# Patient Record
Sex: FEMALE | Race: WHITE | Employment: OTHER | URBAN - METROPOLITAN AREA
[De-identification: names, ages, dates, MRNs, and addresses within clinical notes are randomized per-mention and may not be internally consistent; named-entity substitution may affect disease eponyms.]

---

## 2017-04-03 ENCOUNTER — TRANSFERRED RECORDS (OUTPATIENT)
Dept: HEALTH INFORMATION MANAGEMENT | Facility: CLINIC | Age: 72
End: 2017-04-03

## 2017-06-26 ENCOUNTER — TRANSFERRED RECORDS (OUTPATIENT)
Dept: HEALTH INFORMATION MANAGEMENT | Facility: CLINIC | Age: 72
End: 2017-06-26

## 2018-07-09 ENCOUNTER — TRANSFERRED RECORDS (OUTPATIENT)
Dept: HEALTH INFORMATION MANAGEMENT | Facility: CLINIC | Age: 73
End: 2018-07-09

## 2018-11-01 ENCOUNTER — OFFICE VISIT (OUTPATIENT)
Dept: FAMILY MEDICINE | Facility: CLINIC | Age: 73
End: 2018-11-01
Payer: COMMERCIAL

## 2018-11-01 VITALS
HEART RATE: 66 BPM | SYSTOLIC BLOOD PRESSURE: 160 MMHG | RESPIRATION RATE: 18 BRPM | WEIGHT: 195 LBS | OXYGEN SATURATION: 95 % | DIASTOLIC BLOOD PRESSURE: 80 MMHG | BODY MASS INDEX: 32.49 KG/M2 | TEMPERATURE: 97.6 F | HEIGHT: 65 IN

## 2018-11-01 DIAGNOSIS — E03.4 HYPOTHYROIDISM DUE TO ACQUIRED ATROPHY OF THYROID: ICD-10-CM

## 2018-11-01 DIAGNOSIS — Z13.1 SCREENING FOR DIABETES MELLITUS: ICD-10-CM

## 2018-11-01 DIAGNOSIS — E78.5 HYPERLIPIDEMIA LDL GOAL <130: ICD-10-CM

## 2018-11-01 DIAGNOSIS — R20.8 DYSESTHESIA AFFECTING BOTH SIDES OF BODY: Primary | ICD-10-CM

## 2018-11-01 LAB
BASOPHILS # BLD AUTO: 0 10E9/L (ref 0–0.2)
BASOPHILS NFR BLD AUTO: 0.4 %
CHOLEST SERPL-MCNC: 163 MG/DL
DIFFERENTIAL METHOD BLD: NORMAL
EOSINOPHIL # BLD AUTO: 0.2 10E9/L (ref 0–0.7)
EOSINOPHIL NFR BLD AUTO: 2.8 %
ERYTHROCYTE [DISTWIDTH] IN BLOOD BY AUTOMATED COUNT: 12.2 % (ref 10–15)
HBA1C MFR BLD: 5.4 % (ref 0–5.6)
HCT VFR BLD AUTO: 42.5 % (ref 35–47)
HDLC SERPL-MCNC: 56 MG/DL
HGB BLD-MCNC: 14.3 G/DL (ref 11.7–15.7)
LDLC SERPL CALC-MCNC: 73 MG/DL
LYMPHOCYTES # BLD AUTO: 1 10E9/L (ref 0.8–5.3)
LYMPHOCYTES NFR BLD AUTO: 14.4 %
MCH RBC QN AUTO: 31 PG (ref 26.5–33)
MCHC RBC AUTO-ENTMCNC: 33.6 G/DL (ref 31.5–36.5)
MCV RBC AUTO: 92 FL (ref 78–100)
MONOCYTES # BLD AUTO: 0.6 10E9/L (ref 0–1.3)
MONOCYTES NFR BLD AUTO: 8 %
NEUTROPHILS # BLD AUTO: 5.3 10E9/L (ref 1.6–8.3)
NEUTROPHILS NFR BLD AUTO: 74.4 %
NONHDLC SERPL-MCNC: 107 MG/DL
PLATELET # BLD AUTO: 207 10E9/L (ref 150–450)
RBC # BLD AUTO: 4.61 10E12/L (ref 3.8–5.2)
T4 FREE SERPL-MCNC: 1.21 NG/DL (ref 0.76–1.46)
TRIGL SERPL-MCNC: 169 MG/DL
TSH SERPL DL<=0.005 MIU/L-ACNC: 0.3 MU/L (ref 0.4–4)
VIT B12 SERPL-MCNC: 372 PG/ML (ref 193–986)
WBC # BLD AUTO: 7.1 10E9/L (ref 4–11)

## 2018-11-01 PROCEDURE — 80061 LIPID PANEL: CPT | Performed by: PHYSICIAN ASSISTANT

## 2018-11-01 PROCEDURE — 99203 OFFICE O/P NEW LOW 30 MIN: CPT | Performed by: PHYSICIAN ASSISTANT

## 2018-11-01 PROCEDURE — 85025 COMPLETE CBC W/AUTO DIFF WBC: CPT | Performed by: PHYSICIAN ASSISTANT

## 2018-11-01 PROCEDURE — 84439 ASSAY OF FREE THYROXINE: CPT | Performed by: PHYSICIAN ASSISTANT

## 2018-11-01 PROCEDURE — 82607 VITAMIN B-12: CPT | Performed by: PHYSICIAN ASSISTANT

## 2018-11-01 PROCEDURE — 83036 HEMOGLOBIN GLYCOSYLATED A1C: CPT | Performed by: PHYSICIAN ASSISTANT

## 2018-11-01 PROCEDURE — 84443 ASSAY THYROID STIM HORMONE: CPT | Performed by: PHYSICIAN ASSISTANT

## 2018-11-01 PROCEDURE — 36415 COLL VENOUS BLD VENIPUNCTURE: CPT | Performed by: PHYSICIAN ASSISTANT

## 2018-11-01 RX ORDER — METOPROLOL TARTRATE 50 MG
50 TABLET ORAL 2 TIMES DAILY
COMMUNITY
Start: 2018-04-02 | End: 2018-12-03

## 2018-11-01 RX ORDER — SIMVASTATIN 10 MG
10 TABLET ORAL DAILY
COMMUNITY
Start: 2018-01-29 | End: 2018-12-03

## 2018-11-01 RX ORDER — LEVOTHYROXINE SODIUM 112 UG/1
112 TABLET ORAL DAILY
COMMUNITY
Start: 2017-12-11 | End: 2018-12-03

## 2018-11-01 RX ORDER — ASPIRIN 81 MG/1
81 TABLET ORAL DAILY
COMMUNITY
End: 2023-10-30 | Stop reason: ALTCHOICE

## 2018-11-01 RX ORDER — POLYETHYLENE GLYCOL 3350 17 G/17G
17 POWDER, FOR SOLUTION ORAL DAILY PRN
COMMUNITY

## 2018-11-01 NOTE — LETTER
November 9, 2018      Jenna Kaur  [address] [facility] [address]        Dear ,    We are writing to inform you of your test results.    Overall, your labs look good!   Your cholesterol (LDL) is at goal and your thyroid is in good balance.   No signs of anemia, infection, or diabetes.   Your B12 level is on the lower side of normal - I recommend starting a B Complex vitamin or a multivitamin that has B12 in it.     Resulted Orders   Vitamin B12   Result Value Ref Range    Vitamin B12 372 193 - 986 pg/mL   CBC with platelets differential   Result Value Ref Range    WBC 7.1 4.0 - 11.0 10e9/L    RBC Count 4.61 3.8 - 5.2 10e12/L    Hemoglobin 14.3 11.7 - 15.7 g/dL    Hematocrit 42.5 35.0 - 47.0 %    MCV 92 78 - 100 fl    MCH 31.0 26.5 - 33.0 pg    MCHC 33.6 31.5 - 36.5 g/dL    RDW 12.2 10.0 - 15.0 %    Platelet Count 207 150 - 450 10e9/L    % Neutrophils 74.4 %    % Lymphocytes 14.4 %    % Monocytes 8.0 %    % Eosinophils 2.8 %    % Basophils 0.4 %    Absolute Neutrophil 5.3 1.6 - 8.3 10e9/L    Absolute Lymphocytes 1.0 0.8 - 5.3 10e9/L    Absolute Monocytes 0.6 0.0 - 1.3 10e9/L    Absolute Eosinophils 0.2 0.0 - 0.7 10e9/L    Absolute Basophils 0.0 0.0 - 0.2 10e9/L    Diff Method Automated Method    Hemoglobin A1c   Result Value Ref Range    Hemoglobin A1C 5.4 0 - 5.6 %      Comment:      Normal <5.7% Prediabetes 5.7-6.4%  Diabetes 6.5% or higher - adopted from ADA   consensus guidelines.     Lipid panel reflex to direct LDL Fasting   Result Value Ref Range    Cholesterol 163 <200 mg/dL    Triglycerides 169 (H) <150 mg/dL      Comment:      Borderline high:  150-199 mg/dl  High:             200-499 mg/dl  Very high:       >499 mg/dl  Non Fasting      HDL Cholesterol 56 >49 mg/dL    LDL Cholesterol Calculated 73 <100 mg/dL      Comment:      Desirable:       <100 mg/dl    Non HDL Cholesterol 107 <130 mg/dL   TSH with free T4 reflex   Result Value Ref Range    TSH 0.30 (L) 0.40 - 4.00 mU/L    T4 free   Result Value Ref Range    T4 Free 1.21 0.76 - 1.46 ng/dL       If you have any questions or concerns, please call the clinic at the number listed above.       Sincerely,        Kenna Garcia PA-C/javio

## 2018-11-01 NOTE — PROGRESS NOTES
"  SUBJECTIVE:   Jenna Kaur is a 72 year old female who presents to clinic today for the following health issues:      Musculoskeletal problem/pain      Duration: x3-4 months    Description  Location: both feet    Intensity:  On and off     Accompanying signs and symptoms: burning    History  Previous similar problem: no   Previous evaluation:  none    Precipitating or alleviating factors:  Trauma or overuse: no   Aggravating factors include: walking    Therapies tried and outcome: nothing      PROBLEMS TO ADD ON...  None  -------------------------------------    Problem list and histories reviewed & adjusted, as indicated.  Additional history: as documented    Patient Active Problem List   Diagnosis     Hypothyroidism due to acquired atrophy of thyroid     Hyperlipidemia LDL goal <130     No past surgical history on file.    Social History   Substance Use Topics     Smoking status: Never Smoker     Smokeless tobacco: Never Used     Alcohol use Not on file     No family history on file.        Reviewed and updated as needed this visit by clinical staff       Reviewed and updated as needed this visit by Provider         ROS:  Constitutional, neuro, musculoskeletal, integument systems are negative, except as otherwise noted.    OBJECTIVE:                                                    /80  Pulse 66  Temp 97.6  F (36.4  C) (Tympanic)  Resp 18  Ht 5' 4.53\" (1.639 m)  Wt 195 lb (88.5 kg)  SpO2 95%  BMI 32.93 kg/m2  Body mass index is 32.93 kg/(m^2).  GENERAL APPEARANCE: healthy, alert and no distress  MS: extremities normal- no gross deformities noted  ORTHO: Foot Exam: Inspection:  no swelling   Non-tender  Range of Motion: normal    NEURO: Normal strength and tone and sensory exam grossly normal  PSYCH: mentation appears normal and affect normal/bright       ASSESSMENT:                                                      1. Dysesthesia affecting both sides of body    2. Screening for diabetes " mellitus    3. Hyperlipidemia LDL goal <130    4. Hypothyroidism due to acquired atrophy of thyroid         PLAN:                                                    Possible neuropathy versus her shoes being too tight. Routine lab today. Possible EMG in future if burning worsens.     Patient will follow up in 1-2 weeks to establish care.    The patient was in agreement with the plan today and had no questions or concerns prior to leaving the clinic.     Kenna Garcia PA-C  [facility]

## 2018-11-01 NOTE — Clinical Note
Please abstract the following data from this visit with this patient into the appropriate field in Epic:  Colonoscopy done on this date: 03/05/2012 (approximately), by this group: [facility], results were Normal.  Mammogram done on this date: 07/09/2018 (approximately), by this group: [facility], results were Normal .

## 2018-11-01 NOTE — MR AVS SNAPSHOT
"              After Visit Summary   11/1/2018    Jenna Kaur    MRN: [id number]           Patient Information     Date Of Birth          1945        Visit Information        Provider Department      11/1/2018 9:20 AM Kenna Garcia PA-C [facility]        Today's Diagnoses     Dysesthesia affecting both sides of body    -  1    Screening for diabetes mellitus        Hyperlipidemia LDL goal <130        Hypothyroidism due to acquired atrophy of thyroid           Follow-ups after your visit        Your next 10 appointments already scheduled     Nov 12, 2018 10:15 AM CST   Nurse Only with [facility]   [facility]    [address] [place] [address] [phone]              Who to contact     Normal or non-critical lab and imaging results will be communicated to you by MyChart, letter or phone within 4 business days after the clinic has received the results. If you do not hear from us within 7 days, please contact the clinic through MyChart or phone. If you have a critical or abnormal lab result, we will notify you by phone as soon as possible.  Submit refill requests through [employer] or call your pharmacy and they will forward the refill request to us. Please allow 3 business days for your refill to be completed.          If you need to speak with a  for additional information , please call: [phone]             Additional Information About Your Visit        Care EveryWhere ID     This is your Care EveryWhere ID. This could be used by other organizations to access your [facility] medical records  [id number]        Your Vitals Were     Pulse Temperature Respirations Height Pulse Oximetry BMI (Body Mass Index)    66 97.6  F (36.4  C) (Tympanic) 18 5' 4.53\" (1.639 m) 95% 32.93 kg/m2       Blood Pressure from Last 3 Encounters:   11/01/18 160/80   09/22/11 102/67    Weight from Last 3 Encounters:   11/01/18 195 lb " (88.5 kg)   09/22/11 184 lb 3.2 oz (83.6 kg)              We Performed the Following     CBC with platelets differential     Hemoglobin A1c     Lipid panel reflex to direct LDL Fasting     TSH with free T4 reflex     Vitamin B12        Primary Care Provider Office Phone # Fax #    [facility] [phone] [address] [facility] [place] [address]        Equal Access to Services     GRISELDA PEREZ : Hadii aad ku hadasho Soomaali, waaxda luqadaha, qaybta kaalmada adeegyada, waxay idiin hayaan adeeg kharash la'aan . So [facility] [phone].    ATENCIÓN: Si habla espad, tiene a reid disposición servicios gratuitos de asistencia lingüística. Llame al [phone].    We comply with applicable federal civil rights laws and [place] laws. We do not discriminate on the basis of race, color, national origin, age, disability, sex, sexual orientation, or gender identity.            Thank you!     Thank you for choosing [facility]  for your care. Our goal is always to provide you with excellent care. Hearing back from our patients is one way we can continue to improve our services. Please take a few minutes to complete the written survey that you may receive in the mail after your visit with us. Thank you!             Your Updated Medication List - Protect others around you: Learn how to safely use, store and throw away your medicines at www.disposemymeds.org.          This list is accurate as of 11/1/18 10:15 AM.  Always use your most recent med list.                   Brand Name Dispense Instructions for use Diagnosis    aspirin 81 MG EC tablet      Take 81 mg by mouth daily        CALCIUM 500 + D PO      Take  by mouth daily. Takes 2 tablets        CHOLESTYRAMINE LIGHT PO      Take  by mouth.        * levothyroxine 112 MCG tablet    SYNTHROID/LEVOTHROID     Take 112 mcg by mouth daily        * LEVOTHROID 125 MCG tablet   Generic drug:  levothyroxine      Take 125 mcg by mouth daily.         metoprolol succinate 50 MG 24 hr tablet    TOPROL-XL     Take 50 mg by mouth 2 times daily. Takes 1/2 tablet        metoprolol tartrate 50 MG tablet    LOPRESSOR     Take 50 mg by mouth 2 times daily        omega-3 fatty acids 1200 MG capsule      Take 1 capsule by mouth daily.        polyethylene glycol powder    MIRALAX/GLYCOLAX     as needed        * simvastatin 10 MG tablet    ZOCOR     Take 10 mg by mouth daily        * simvastatin 10 MG tablet    ZOCOR     Take 10 mg by mouth At Bedtime.        * Notice:  This list has 4 medication(s) that are the same as other medications prescribed for you. Read the directions carefully, and ask your doctor or other care provider to review them with you.

## 2018-11-05 NOTE — PROGRESS NOTES
Please send the following letter to the patient:    Jenna,    Overall, your labs look good!  Your cholesterol (LDL) is at goal and your thyroid is in good balance.   No signs of anemia, infection, or diabetes.   Your B12 level is on the lower side of normal - I recommend starting a B Complex vitamin or a multivitamin that has B12 in it.    Please call me with any questions or concerns.          Kenna Garcia PA-C

## 2018-11-12 ENCOUNTER — ALLIED HEALTH/NURSE VISIT (OUTPATIENT)
Dept: NURSING | Facility: CLINIC | Age: 73
End: 2018-11-12
Payer: COMMERCIAL

## 2018-11-12 VITALS — DIASTOLIC BLOOD PRESSURE: 80 MMHG | HEART RATE: 61 BPM | SYSTOLIC BLOOD PRESSURE: 124 MMHG

## 2018-11-12 DIAGNOSIS — Z01.30 BP CHECK: Primary | ICD-10-CM

## 2018-11-12 PROCEDURE — 99207 ZZC NO CHARGE NURSE ONLY: CPT

## 2018-11-12 NOTE — PROGRESS NOTES
Jenna Kaur is a 72 year old patient who comes in today for a Blood Pressure check.  Initial BP:  /80  Pulse 61       Disposition: results routed to provider

## 2018-11-12 NOTE — MR AVS SNAPSHOT
After Visit Summary   11/12/2018    Jenna Kaur    MRN: [id number]           Patient Information     Date Of Birth          1945        Visit Information        Provider Department      11/12/2018 10:15 AM [facility]        Today's Diagnoses     BP check    -  1       Follow-ups after your visit        Your next 10 appointments already scheduled     Dec 03, 2018  9:20 AM CST   PHYSICAL with Kenna Garcia PA-C   [facility] [place] ([facility])    [address]  [place] [address] [phone]              Who to contact     If you have questions or need follow up information about today's clinic visit or your schedule please contact [facility] directly at [phone].  Normal or non-critical lab and imaging results will be communicated to you by MyChart, letter or phone within 4 business days after the clinic has received the results. If you do not hear from us within 7 days, please contact the clinic through MyChart or phone. If you have a critical or abnormal lab result, we will notify you by phone as soon as possible.  Submit refill requests through [employer] or call your pharmacy and they will forward the refill request to us. Please allow 3 business days for your refill to be completed.          Additional Information About Your Visit        Care EveryWhere ID     This is your Care EveryWhere ID. This could be used by other organizations to access your [facility] medical records  [id number]        Your Vitals Were     Pulse                   61            Blood Pressure from Last 3 Encounters:   11/12/18 124/80   11/01/18 160/80   09/22/11 102/67    Weight from Last 3 Encounters:   11/01/18 195 lb (88.5 kg)   09/22/11 184 lb 3.2 oz (83.6 kg)              Today, you had the following     No orders found for display       Primary Care Provider Office Phone # Fax #    [facility] [phone]  [phone]       [address] [facility] [address] [address]        Equal Access to Services     GRISELDA PEREZ : Hadii cm bautista joanneo Soayse, waaxda luqadaha, qaybta kaalmada yahaira, pepper gilmerin hayaaaamndeep grahamsalomón kirbyrayna ирина vinson. So [facility] [phone].    ATENCIÓN: Si habla español, tiene a reid disposición servicios gratuitos de asistencia lingüística. Llame al [phone].    We comply with applicable federal civil rights laws and [place] laws. We do not discriminate on the basis of race, color, national origin, age, disability, sex, sexual orientation, or gender identity.            Thank you!     Thank you for choosing [facility]  for your care. Our goal is always to provide you with excellent care. Hearing back from our patients is one way we can continue to improve our services. Please take a few minutes to complete the written survey that you may receive in the mail after your visit with us. Thank you!             Your Updated Medication List - Protect others around you: Learn how to safely use, store and throw away your medicines at www.disposemymeds.org.          This list is accurate as of 11/12/18 10:41 AM.  Always use your most recent med list.                   Brand Name Dispense Instructions for use Diagnosis    aspirin 81 MG EC tablet      Take 81 mg by mouth daily        CALCIUM 500 + D PO      Take  by mouth daily. Takes 2 tablets        CHOLESTYRAMINE LIGHT PO      Take  by mouth.        * levothyroxine 112 MCG tablet    SYNTHROID/LEVOTHROID     Take 112 mcg by mouth daily        * LEVOTHROID 125 MCG tablet   Generic drug:  levothyroxine      Take 125 mcg by mouth daily.        metoprolol succinate 50 MG 24 hr tablet    TOPROL-XL     Take 50 mg by mouth 2 times daily. Takes 1/2 tablet        metoprolol tartrate 50 MG tablet    LOPRESSOR     Take 50 mg by mouth 2 times daily        omega-3 fatty acids 1200 MG capsule      Take 1 capsule by mouth daily.        polyethylene glycol powder     MIRALAX/GLYCOLAX     as needed        * simvastatin 10 MG tablet    ZOCOR     Take 10 mg by mouth daily        * simvastatin 10 MG tablet    ZOCOR     Take 10 mg by mouth At Bedtime.        * Notice:  This list has 4 medication(s) that are the same as other medications prescribed for you. Read the directions carefully, and ask your doctor or other care provider to review them with you.

## 2018-12-03 ENCOUNTER — OFFICE VISIT (OUTPATIENT)
Dept: FAMILY MEDICINE | Facility: CLINIC | Age: 73
End: 2018-12-03
Payer: COMMERCIAL

## 2018-12-03 VITALS
HEART RATE: 76 BPM | WEIGHT: 195.2 LBS | RESPIRATION RATE: 20 BRPM | BODY MASS INDEX: 32.96 KG/M2 | OXYGEN SATURATION: 97 % | SYSTOLIC BLOOD PRESSURE: 124 MMHG | DIASTOLIC BLOOD PRESSURE: 68 MMHG | TEMPERATURE: 96.3 F

## 2018-12-03 DIAGNOSIS — I10 ESSENTIAL HYPERTENSION WITH GOAL BLOOD PRESSURE LESS THAN 140/90: ICD-10-CM

## 2018-12-03 DIAGNOSIS — E03.4 HYPOTHYROIDISM DUE TO ACQUIRED ATROPHY OF THYROID: ICD-10-CM

## 2018-12-03 DIAGNOSIS — E78.5 HYPERLIPIDEMIA LDL GOAL <130: ICD-10-CM

## 2018-12-03 DIAGNOSIS — Z00.01 ENCOUNTER FOR ROUTINE ADULT HEALTH EXAMINATION WITH ABNORMAL FINDINGS: Primary | ICD-10-CM

## 2018-12-03 DIAGNOSIS — I35.0 NONRHEUMATIC AORTIC VALVE STENOSIS: ICD-10-CM

## 2018-12-03 DIAGNOSIS — M85.80 OSTEOPENIA, UNSPECIFIED LOCATION: ICD-10-CM

## 2018-12-03 DIAGNOSIS — Z78.0 ASYMPTOMATIC POSTMENOPAUSAL STATUS: ICD-10-CM

## 2018-12-03 PROCEDURE — 99397 PER PM REEVAL EST PAT 65+ YR: CPT | Performed by: PHYSICIAN ASSISTANT

## 2018-12-03 RX ORDER — METOPROLOL SUCCINATE 50 MG/1
25 TABLET, EXTENDED RELEASE ORAL 2 TIMES DAILY
Qty: 90 TABLET | Refills: 3 | COMMUNITY
Start: 2018-12-03 | End: 2019-01-02

## 2018-12-03 RX ORDER — SIMVASTATIN 10 MG
10 TABLET ORAL AT BEDTIME
Qty: 90 TABLET | Refills: 3 | COMMUNITY
Start: 2018-12-03 | End: 2019-01-02

## 2018-12-03 RX ORDER — LEVOTHYROXINE SODIUM 112 UG/1
112 TABLET ORAL DAILY
Qty: 90 TABLET | Refills: 3 | COMMUNITY
Start: 2018-12-03 | End: 2019-01-02

## 2018-12-03 NOTE — PATIENT INSTRUCTIONS
Preventive Health Recommendations    See your health care provider every year to    Review health changes.     Discuss preventive care.      Review your medicines if your doctor has prescribed any.      You no longer need a yearly Pap test unless you've had an abnormal Pap test in the past 10 years. If you have vaginal symptoms, such as bleeding or discharge, be sure to talk with your provider about a Pap test.      Every 1 to 2 years, have a mammogram.  If you are over 69, talk with your health care provider about whether or not you want to continue having screening mammograms.      Every 10 years, have a colonoscopy. Or, have a yearly FIT test (stool test). These exams will check for colon cancer.       Have a cholesterol test every 5 years, or more often if your doctor advises it.       Have a diabetes test (fasting glucose) every three years. If you are at risk for diabetes, you should have this test more often.       At age 65, have a bone density scan (DEXA) to check for osteoporosis (brittle bone disease).    Shots:    Get a flu shot each year.    Get a tetanus shot every 10 years.    Talk to your doctor about your pneumonia vaccines. There are now two you should receive - Pneumovax (PPSV 23) and Prevnar (PCV 13).    Talk to your pharmacist about the shingles vaccine.    Talk to your doctor about the hepatitis B vaccine.    Nutrition:     Eat at least 5 servings of fruits and vegetables each day.      Eat whole-grain bread, whole-wheat pasta and brown rice instead of white grains and rice.      Get adequate about Calcium and Vitamin D.     Lifestyle    Exercise at least 150 minutes a week (30 minutes a day, 5 days a week). This will help you control your weight and prevent disease.      Limit alcohol to one drink per day.      No smoking.       Wear sunscreen to prevent skin cancer.       See your dentist twice a year for an exam and cleaning.      See your eye doctor every 1 to 2 years to screen for  conditions such as glaucoma, macular degeneration, cataracts, etc.    Personalized Prevention Plan  You are due for the preventive services outlined below.  Your care team is available to assist you in scheduling these services.  If you have already completed any of these items, please share that information with your care team to update in your medical record.    Health Maintenance Due   Topic Date Due     Depression Assessment 2 - yearly  12/27/1955     Discuss Advance Directive Planning  12/27/1998     FALL RISK ASSESSMENT  12/27/2008     Bone Density Screening (Dexa)  12/27/2008

## 2018-12-03 NOTE — MR AVS SNAPSHOT
After Visit Summary   12/3/2018    Jenna Kaur    MRN: [id number]           Patient Information     Date Of Birth          1945        Visit Information        Provider Department      12/3/2018 9:20 AM Kenna Garcia PA-C [facility]        Today's Diagnoses     Encounter for routine adult health examination with abnormal findings    -  1    Asymptomatic postmenopausal status        Hypothyroidism due to acquired atrophy of thyroid        Hyperlipidemia LDL goal <130        Essential hypertension with goal blood pressure less than 140/90        Osteopenia, unspecified location        Nonrheumatic aortic valve stenosis          Care Instructions      Preventive Health Recommendations    See your health care provider every year to    Review health changes.     Discuss preventive care.      Review your medicines if your doctor has prescribed any.      You no longer need a yearly Pap test unless you've had an abnormal Pap test in the past 10 years. If you have vaginal symptoms, such as bleeding or discharge, be sure to talk with your provider about a Pap test.      Every 1 to 2 years, have a mammogram.  If you are over 69, talk with your health care provider about whether or not you want to continue having screening mammograms.      Every 10 years, have a colonoscopy. Or, have a yearly FIT test (stool test). These exams will check for colon cancer.       Have a cholesterol test every 5 years, or more often if your doctor advises it.       Have a diabetes test (fasting glucose) every three years. If you are at risk for diabetes, you should have this test more often.       At age 65, have a bone density scan (DEXA) to check for osteoporosis (brittle bone disease).    Shots:    Get a flu shot each year.    Get a tetanus shot every 10 years.    Talk to your doctor about your pneumonia vaccines. There are now two you should receive - Pneumovax (PPSV 23) and Prevnar (PCV 13).    Talk  to your pharmacist about the shingles vaccine.    Talk to your doctor about the hepatitis B vaccine.    Nutrition:     Eat at least 5 servings of fruits and vegetables each day.      Eat whole-grain bread, whole-wheat pasta and brown rice instead of white grains and rice.      Get adequate about Calcium and Vitamin D.     Lifestyle    Exercise at least 150 minutes a week (30 minutes a day, 5 days a week). This will help you control your weight and prevent disease.      Limit alcohol to one drink per day.      No smoking.       Wear sunscreen to prevent skin cancer.       See your dentist twice a year for an exam and cleaning.      See your eye doctor every 1 to 2 years to screen for conditions such as glaucoma, macular degeneration, cataracts, etc.    Personalized Prevention Plan  You are due for the preventive services outlined below.  Your care team is available to assist you in scheduling these services.  If you have already completed any of these items, please share that information with your care team to update in your medical record.    Health Maintenance Due   Topic Date Due     Depression Assessment 2 - yearly  12/27/1957     Discuss Advance Directive Planning  12/27/1998     FALL RISK ASSESSMENT  12/27/2008     Bone Density Screening (Dexa)  12/27/2008             Follow-ups after your visit        Future tests that were ordered for you today     Open Future Orders        Priority Expected Expires Ordered    DEXA HIP/PELVIS/SPINE - Future Routine  12/3/2019 12/3/2018            Who to contact     Normal or non-critical lab and imaging results will be communicated to you by MyChart, letter or phone within 4 business days after the clinic has received the results. If you do not hear from us within 7 days, please contact the clinic through MyChart or phone. If you have a critical or abnormal lab result, we will notify you by phone as soon as possible.  Submit refill requests through [employer] or call your  pharmacy and they will forward the refill request to us. Please allow 3 business days for your refill to be completed.          If you need to speak with a  for additional information , please call: [phone]             Additional Information About Your Visit        Care EveryWhere ID     This is your Care EveryWhere ID. This could be used by other organizations to access your [place] medical records  [id number]        Your Vitals Were     Pulse Temperature Respirations Pulse Oximetry BMI (Body Mass Index)       76 96.3  F (35.7  C) (Tympanic) 20 97% 32.96 kg/m2        Blood Pressure from Last 3 Encounters:   12/03/18 150/85   11/12/18 124/80   11/01/18 160/80    Weight from Last 3 Encounters:   12/03/18 195 lb 3.2 oz (88.5 kg)   11/01/18 195 lb (88.5 kg)   09/22/11 184 lb 3.2 oz (83.6 kg)                 Today's Medication Changes          These changes are accurate as of 12/3/18 10:21 AM.  If you have any questions, ask your nurse or doctor.               These medicines have changed or have updated prescriptions.        Dose/Directions    levothyroxine 112 MCG tablet   Commonly known as:  SYNTHROID/LEVOTHROID   This may have changed:  Another medication with the same name was removed. Continue taking this medication, and follow the directions you see here.   Changed by:  Kenna Garcia PA-C        Dose:  112 mcg   Take 1 tablet (112 mcg) by mouth daily   Quantity:  90 tablet   Refills:  3       metoprolol succinate ER 50 MG 24 hr tablet   Commonly known as:  TOPROL-XL   This may have changed:    - how much to take  - additional instructions   Changed by:  Kenna Garcia PA-C        Dose:  25 mg   Take 0.5 tablets (25 mg) by mouth 2 times daily   Quantity:  90 tablet   Refills:  3       simvastatin 10 MG tablet   Commonly known as:  ZOCOR   This may have changed:  Another medication with the same name was removed. Continue taking this medication, and follow the directions you  see here.   Changed by:  Kenna Garcia PA-C        Dose:  10 mg   Take 1 tablet (10 mg) by mouth At Bedtime   Quantity:  90 tablet   Refills:  3         Stop taking these medicines if you haven't already. Please contact your care team if you have questions.     metoprolol tartrate 50 MG tablet   Commonly known as:  LOPRESSOR   Stopped by:  Kenna Garcia PA-C                    Primary Care Provider Office Phone # Fax #    [facility] [phone] [id number] [facility] [id number]        Equal Access to Services     [facility]: Hadii aad ku hadasho Soomaali, waaxda luqadaha, qaybta kaalmada adeegyada, pepper gifford . So [facility] [phone].    ATENCIÓN: Si habla español, tiene a reid disposición servicios gratuitos de asistencia lingüística. [facility] [phone].    We comply with applicable federal civil rights laws and [place] laws. We do not discriminate on the basis of race, color, national origin, age, disability, sex, sexual orientation, or gender identity.            Thank you!     Thank you for choosing [facility]  for your care. Our goal is always to provide you with excellent care. Hearing back from our patients is one way we can continue to improve our services. Please take a few minutes to complete the written survey that you may receive in the mail after your visit with us. Thank you!             Your Updated Medication List - Protect others around you: Learn how to safely use, store and throw away your medicines at www.disposemymeds.org.          This list is accurate as of 12/3/18 10:21 AM.  Always use your most recent med list.                   Brand Name Dispense Instructions for use Diagnosis    aspirin 81 MG EC tablet      Take 81 mg by mouth daily        CALCIUM 500 + D PO      Take  by mouth daily. Takes 2 tablets        CHOLESTYRAMINE LIGHT PO      Take  by mouth.        levothyroxine 112 MCG tablet     SYNTHROID/LEVOTHROID    90 tablet    Take 1 tablet (112 mcg) by mouth daily        metoprolol succinate ER 50 MG 24 hr tablet    TOPROL-XL    90 tablet    Take 0.5 tablets (25 mg) by mouth 2 times daily        omega-3 fatty acids 1200 MG capsule      Take 1 capsule by mouth daily.        polyethylene glycol powder    MIRALAX/GLYCOLAX     as needed        simvastatin 10 MG tablet    ZOCOR    90 tablet    Take 1 tablet (10 mg) by mouth At Bedtime

## 2018-12-03 NOTE — PROGRESS NOTES
"    SUBJECTIVE:   Jenna Kaur is a 72 year old female who presents for Preventive Visit.    Are you in the first 12 months of your Medicare Part B coverage?  No    Physical Health:    In general, how would you rate your overall physical health? excellent    Outside of work, how many days during the week do you exercise? 2-3 days/week walks on treadmill    Outside of work, approximately how many minutes a day do you exercise?30-45 minutes    If you drink alcohol do you typically have >3 drinks per day or >7 drinks per week? No    Do you usually eat at least 4 servings of fruit and vegetables a day, include whole grains & fiber and avoid regularly eating high fat or \"junk\" foods? NO    Do you have any problems taking medications regularly?  No    Do you have any side effects from medications? none    Needs assistance for the following daily activities: no assistance needed    Which of the following safety concerns are present in your home?  none identified     Hearing impairment: No    In the past 6 months, have you been bothered by leaking of urine? no    Mental Health:    In general, how would you rate your overall mental or emotional health? excellent  PHQ-2 Score:    PHQ-2 Score:     PHQ-2 (  [employer]) 12/3/2018   Q1: Little interest or pleasure in doing things 0   Q2: Feeling down, depressed or hopeless 0   PHQ-2 Score 0       Do you feel safe in your environment? Yes    Do you have a Health Care Directive? Yes: Patient states has Advance Directive and will bring in a copy to clinic.    Additional concerns to address?  No    Fall risk:  Fallen 2 or more times in the past year?: No  Any fall with injury in the past year?: No    Cognitive Screenin) Repeat 3 items (Leader, Season, Table)    2) Clock draw: NORMAL  3) 3 item recall: Recalls 3 objects  Results: 3 items recalled: COGNITIVE IMPAIRMENT LESS LIKELY    Mini-CogTM Copyright S Kareem. Licensed by the author for use in [facility]; " reprinted with permission (steph@.[email or web address]). All rights reserved.      Do you have sleep apnea, excessive snoring or daytime drowsiness?: no        PROBLEMS TO ADD ON...  Patient informed that anything we discuss that is not related to preventative medicine, may be billed for; patient verbalizes understanding.  None  -------------------------------------    Reviewed and updated as needed this visit by clinical staff  Tobacco  Allergies  Meds         Reviewed and updated as needed this visit by Provider        Social History   Substance Use Topics     Smoking status: Never Smoker     Smokeless tobacco: Never Used     Alcohol use Not on file                           Current providers sharing in care for this patient include:   Patient Care Team:  Jennifer Carias as PCP - General    The following health maintenance items are reviewed in Epic and correct as of today:  Health Maintenance   Topic Date Due     PHQ-2 Q1 YR  12/27/1957     ADVANCE DIRECTIVE PLANNING Q5 YRS  12/27/1998     FALL RISK ASSESSMENT  12/27/2008     DEXA SCAN SCREENING (SYSTEM ASSIGNED)  12/27/2008     TSH W/ FREE T4 REFLEX Q1 YEAR  11/01/2019     MAMMO SCREEN Q2 YR (SYSTEM ASSIGNED)  07/09/2020     TETANUS IMMUNIZATION (SYSTEM ASSIGNED)  11/17/2021     LIPID SCREEN Q5 YR FEMALE (SYSTEM ASSIGNED)  11/01/2023     COLON CANCER SCREEN (SYSTEM ASSIGNED)  04/03/2027     PNEUMOCOCCAL  Completed     INFLUENZA VACCINE  Completed     Labs reviewed in EPIC  Pneumonia Vaccine: completed  Mammogram Screening: Mammogram Screening: Patient over age 50, mutual decision to screen reflected in health maintenance.  History of abnormal Pap smear: NO - age 65 - see link Cervical Cytology Screening Guidelines    ROS:  Other than what is noted in the HPI and PMH a complete review of systems is otherwise negative including: Constitutional, HEENT, endocrine, cardiovascular, respiratory, GI/, musculoskeletal, neuro, and psychiatric.     OBJECTIVE:   /68   "Pulse 76  Temp 96.3  F (35.7  C) (Tympanic)  Resp 20  Wt 195 lb 3.2 oz (88.5 kg)  SpO2 97%  BMI 32.96 kg/m2 Estimated body mass index is 32.96 kg/(m^2) as calculated from the following:    Height as of 11/1/18: 5' 4.53\" (1.639 m).    Weight as of this encounter: 195 lb 3.2 oz (88.5 kg).  EXAM:   GENERAL: healthy, alert and no distress  EYES: Eyes grossly normal to inspection, PERRL and conjunctivae and sclerae normal  HENT: ear canals and TM's normal, nose and mouth without ulcers or lesions  NECK: no adenopathy, no asymmetry, masses, or scars and thyroid normal to palpation  RESP: lungs clear to auscultation - no rales, rhonchi or wheezes  BREAST: normal without masses, tenderness or nipple discharge and no palpable axillary masses or adenopathy  CV: regular rate and rhythm, normal S1 S2, no S3 or S4, no murmur, click or rub, no peripheral edema and peripheral pulses strong  ABDOMEN: soft, nontender, no hepatosplenomegaly, no masses and bowel sounds normal  MS: no gross musculoskeletal defects noted, no edema  SKIN: no suspicious lesions or rashes  NEURO: Normal strength and tone, mentation intact and speech normal  PSYCH: mentation appears normal, affect normal/bright    ASSESSMENT / PLAN:       ICD-10-CM    1. Encounter for routine adult health examination with abnormal findings Z00.01    2. Asymptomatic postmenopausal status Z78.0 DEXA HIP/PELVIS/SPINE - Future   3. Hypothyroidism due to acquired atrophy of thyroid E03.4    4. Hyperlipidemia LDL goal <130 E78.5    5. Essential hypertension with goal blood pressure less than 140/90 I10    6. Osteopenia, unspecified location M85.80    7. Nonrheumatic aortic valve stenosis I35.0        Recent labs reviewed. Patient will let me know which pharmacy she'll be using when she changes insurance. Screenings discussed.    Echo every 5 years.       End of Life Planning:  Patient currently has an advanced directive: Yes, will bring in copy    COUNSELING:  Reviewed " "preventive health counseling, as reflected in patient instructions    BP Readings from Last 1 Encounters:   12/03/18 124/68     Estimated body mass index is 32.96 kg/(m^2) as calculated from the following:    Height as of 11/1/18: 5' 4.53\" (1.639 m).    Weight as of this encounter: 195 lb 3.2 oz (88.5 kg).     reports that she has never smoked. She has never used smokeless tobacco.      Appropriate preventive services were discussed with this patient, including applicable screening as appropriate for cardiovascular disease, diabetes, osteopenia/osteoporosis, and glaucoma.  As appropriate for age/gender, discussed screening for colorectal cancer, prostate cancer, breast cancer, and cervical cancer. Checklist reviewing preventive services available has been given to the patient.    Reviewed patients plan of care and provided an AVS. The Basic Care Plan (routine screening as documented in Health Maintenance) for Jenna meets the Care Plan requirement. This Care Plan has been established and reviewed with the Patient.    Counseling Resources:  ATP IV Guidelines  Pooled Cohorts Equation Calculator  Breast Cancer Risk Calculator  FRAX Risk Assessment  ICSI Preventive Guidelines  Dietary Guidelines for Americans, 2010  USDA's MyPlate  ASA Prophylaxis  Lung CA Screening    Kenna Garcia PA-C  [facility]  "

## 2019-01-02 DIAGNOSIS — I10 ESSENTIAL HYPERTENSION WITH GOAL BLOOD PRESSURE LESS THAN 140/90: ICD-10-CM

## 2019-01-02 DIAGNOSIS — E03.4 HYPOTHYROIDISM DUE TO ACQUIRED ATROPHY OF THYROID: ICD-10-CM

## 2019-01-02 DIAGNOSIS — E78.5 HYPERLIPIDEMIA LDL GOAL <130: Primary | ICD-10-CM

## 2019-01-02 RX ORDER — LEVOTHYROXINE SODIUM 112 UG/1
112 TABLET ORAL DAILY
Qty: 90 TABLET | Refills: 3 | Status: SHIPPED | OUTPATIENT
Start: 2019-01-02 | End: 2019-12-09

## 2019-01-02 RX ORDER — SIMVASTATIN 10 MG
10 TABLET ORAL AT BEDTIME
Qty: 90 TABLET | Refills: 3 | Status: SHIPPED | OUTPATIENT
Start: 2019-01-02 | End: 2019-12-09

## 2019-01-02 RX ORDER — METOPROLOL SUCCINATE 50 MG/1
25 TABLET, EXTENDED RELEASE ORAL 2 TIMES DAILY
Qty: 90 TABLET | Refills: 3 | Status: SHIPPED | OUTPATIENT
Start: 2019-01-02 | End: 2019-08-29

## 2019-05-06 ENCOUNTER — MYC MEDICAL ADVICE (OUTPATIENT)
Dept: FAMILY MEDICINE | Facility: CLINIC | Age: 74
End: 2019-05-06

## 2019-07-12 ENCOUNTER — ANCILLARY PROCEDURE (OUTPATIENT)
Dept: MAMMOGRAPHY | Facility: CLINIC | Age: 74
End: 2019-07-12
Attending: PHYSICIAN ASSISTANT
Payer: COMMERCIAL

## 2019-07-12 DIAGNOSIS — Z12.39 SCREENING BREAST EXAMINATION: ICD-10-CM

## 2019-07-12 PROCEDURE — 77067 SCR MAMMO BI INCL CAD: CPT | Performed by: RADIOLOGY

## 2019-08-29 ENCOUNTER — ANCILLARY PROCEDURE (OUTPATIENT)
Dept: GENERAL RADIOLOGY | Facility: CLINIC | Age: 74
End: 2019-08-29
Attending: PHYSICIAN ASSISTANT
Payer: COMMERCIAL

## 2019-08-29 ENCOUNTER — OFFICE VISIT (OUTPATIENT)
Dept: FAMILY MEDICINE | Facility: CLINIC | Age: 74
End: 2019-08-29
Payer: COMMERCIAL

## 2019-08-29 VITALS
HEART RATE: 79 BPM | WEIGHT: 194.6 LBS | RESPIRATION RATE: 18 BRPM | SYSTOLIC BLOOD PRESSURE: 130 MMHG | TEMPERATURE: 97.1 F | DIASTOLIC BLOOD PRESSURE: 78 MMHG | HEIGHT: 64 IN | OXYGEN SATURATION: 94 % | BODY MASS INDEX: 33.22 KG/M2

## 2019-08-29 DIAGNOSIS — M25.562 ACUTE PAIN OF LEFT KNEE: Primary | ICD-10-CM

## 2019-08-29 DIAGNOSIS — M79.671 FOOT PAIN, BILATERAL: ICD-10-CM

## 2019-08-29 DIAGNOSIS — M79.672 FOOT PAIN, BILATERAL: ICD-10-CM

## 2019-08-29 DIAGNOSIS — M25.562 ACUTE PAIN OF LEFT KNEE: ICD-10-CM

## 2019-08-29 DIAGNOSIS — I10 ESSENTIAL HYPERTENSION WITH GOAL BLOOD PRESSURE LESS THAN 140/90: ICD-10-CM

## 2019-08-29 DIAGNOSIS — Z78.0 POSTMENOPAUSAL STATUS: ICD-10-CM

## 2019-08-29 PROCEDURE — 73560 X-RAY EXAM OF KNEE 1 OR 2: CPT | Mod: LT

## 2019-08-29 PROCEDURE — 99213 OFFICE O/P EST LOW 20 MIN: CPT | Performed by: PHYSICIAN ASSISTANT

## 2019-08-29 PROCEDURE — 73630 X-RAY EXAM OF FOOT: CPT | Mod: RT

## 2019-08-29 RX ORDER — METOPROLOL SUCCINATE 25 MG/1
25 TABLET, EXTENDED RELEASE ORAL 2 TIMES DAILY
Qty: 180 TABLET | Refills: 3 | Status: SHIPPED | OUTPATIENT
Start: 2019-08-29 | End: 2019-12-09

## 2019-08-29 ASSESSMENT — MIFFLIN-ST. JEOR: SCORE: 1368.57

## 2019-08-29 NOTE — PROGRESS NOTES
Subjective     Jenna Kaur is a 73 year old female who presents to clinic today for the following health issues:    HPI   Musculoskeletal problem/pain      Duration: on/off x4 months worsening in the last 2wks    Description  Location: behind the left knee    Intensity:  mild    Accompanying signs and symptoms: none    History  Previous similar problem: no   Previous evaluation:  none    Precipitating or alleviating factors:  Trauma or overuse: no   Aggravating factors include: from a sitting to standing position, walking and climbing stairs    Therapies tried and outcome: heat, ice and acetaminophen    Has had a Baker's cyst previously       Musculoskeletal problem/pain      Duration: x1 yr    Description  Location: top of both feet    Intensity:  moderate    Accompanying signs and symptoms: painful and stiff     History  Previous similar problem: YES- 11/01/2018  Previous evaluation:  none    Precipitating or alleviating factors:  Trauma or overuse: no   Aggravating factors include: walking    Therapies tried and outcome: heat, ice and acetaminophen        PROBLEMS TO ADD ON...  none  -------------------------------------    Patient Active Problem List   Diagnosis     Hypothyroidism due to acquired atrophy of thyroid     Hyperlipidemia LDL goal <130     Essential hypertension with goal blood pressure less than 140/90     Osteopenia, unspecified location     Nonrheumatic aortic valve stenosis     No past surgical history on file.    Social History     Tobacco Use     Smoking status: Never Smoker     Smokeless tobacco: Never Used   Substance Use Topics     Alcohol use: Not on file     Family History   Problem Relation Age of Onset     Breast Cancer Mother 70     Colon Cancer Father 59     Arrhythmia Brother      Sleep Apnea Brother            Reviewed and updated as needed this visit by Provider         Review of Systems   ROS COMP: Constitutional, musculoskeletal, neuro, skin systems are negative, except as  "otherwise noted.      Objective    /78   Pulse 79   Temp 97.1  F (36.2  C) (Tympanic)   Resp 18   Ht 1.635 m (5' 4.37\")   Wt 88.3 kg (194 lb 9.6 oz)   SpO2 94%   BMI 33.02 kg/m    Body mass index is 33.02 kg/m .  Physical Exam   GENERAL: healthy, alert and no distress  MS: no gross musculoskeletal defects noted, no edema  MS: LLE exam shows normal strength and muscle mass, no deformities, no evidence of joint effusion and no evidence of joint instability  SKIN: no suspicious lesions or rashes  NEURO: Normal strength and tone, mentation intact and speech normal  PSYCH: mentation appears normal, affect normal/bright        Assessment & Plan   Assessment    ICD-10-CM    1. Acute pain of left knee M25.562 XR Knee Standing AP Bilat & Lateral Left   2. Foot pain, bilateral M79.671 XR Foot Bilateral G/E 3 Views    M79.672    3. Postmenopausal status Z78.0 DEXA HIP/PELVIS/SPINE - Future   4. Essential hypertension with goal blood pressure less than 140/90 I10 metoprolol succinate ER (TOPROL-XL) 25 MG 24 hr tablet       Plan  1,2) X-rays today. Knee seems to be improving the last couple days. Recommended Glucosamine chondroitin. Supportive therapy also discussed. Follow up if symptoms fail to improve or worsen.      4) Rx changed from half tabs to whole tabs - dose not changed today.    BMI:   Estimated body mass index is 33.02 kg/m  as calculated from the following:    Height as of this encounter: 1.635 m (5' 4.37\").    Weight as of this encounter: 88.3 kg (194 lb 9.6 oz).     Return in about 4 months (around 12/29/2019) for your annual physical.    Kenna Garcia PA-C  [facility]          "

## 2019-09-23 ENCOUNTER — ANCILLARY PROCEDURE (OUTPATIENT)
Dept: BONE DENSITY | Facility: CLINIC | Age: 74
End: 2019-09-23
Attending: PHYSICIAN ASSISTANT
Payer: COMMERCIAL

## 2019-09-23 DIAGNOSIS — Z78.0 POSTMENOPAUSAL STATUS: ICD-10-CM

## 2019-09-23 PROCEDURE — 77080 DXA BONE DENSITY AXIAL: CPT | Performed by: INTERNAL MEDICINE

## 2019-09-28 ENCOUNTER — HEALTH MAINTENANCE LETTER (OUTPATIENT)
Age: 74
End: 2019-09-28

## 2019-09-30 PROBLEM — M85.852 OSTEOPENIA OF BOTH HIPS: Status: ACTIVE | Noted: 2018-12-03

## 2019-09-30 PROBLEM — M85.851 OSTEOPENIA OF BOTH HIPS: Status: ACTIVE | Noted: 2018-12-03

## 2019-12-09 ENCOUNTER — OFFICE VISIT (OUTPATIENT)
Dept: FAMILY MEDICINE | Facility: CLINIC | Age: 74
End: 2019-12-09
Payer: COMMERCIAL

## 2019-12-09 VITALS
BODY MASS INDEX: 33.9 KG/M2 | HEART RATE: 69 BPM | SYSTOLIC BLOOD PRESSURE: 156 MMHG | DIASTOLIC BLOOD PRESSURE: 84 MMHG | WEIGHT: 198.6 LBS | TEMPERATURE: 97.2 F | OXYGEN SATURATION: 96 % | RESPIRATION RATE: 16 BRPM | HEIGHT: 64 IN

## 2019-12-09 DIAGNOSIS — Z00.00 ENCOUNTER FOR MEDICARE ANNUAL WELLNESS EXAM: Primary | ICD-10-CM

## 2019-12-09 DIAGNOSIS — E03.4 HYPOTHYROIDISM DUE TO ACQUIRED ATROPHY OF THYROID: ICD-10-CM

## 2019-12-09 DIAGNOSIS — R09.89 BRUIT OF RIGHT CAROTID ARTERY: ICD-10-CM

## 2019-12-09 DIAGNOSIS — I10 ESSENTIAL HYPERTENSION WITH GOAL BLOOD PRESSURE LESS THAN 140/90: ICD-10-CM

## 2019-12-09 DIAGNOSIS — E78.5 HYPERLIPIDEMIA LDL GOAL <130: ICD-10-CM

## 2019-12-09 LAB
CHOLEST SERPL-MCNC: 172 MG/DL
CREAT SERPL-MCNC: 1 MG/DL (ref 0.52–1.04)
CREAT UR-MCNC: 267 MG/DL
GFR SERPL CREATININE-BSD FRML MDRD: 55 ML/MIN/{1.73_M2}
GLUCOSE SERPL-MCNC: 100 MG/DL (ref 70–99)
HDLC SERPL-MCNC: 61 MG/DL
LDLC SERPL CALC-MCNC: 85 MG/DL
MICROALBUMIN UR-MCNC: 33 MG/L
MICROALBUMIN/CREAT UR: 12.43 MG/G CR (ref 0–25)
NONHDLC SERPL-MCNC: 111 MG/DL
TRIGL SERPL-MCNC: 129 MG/DL
TSH SERPL DL<=0.005 MIU/L-ACNC: 0.7 MU/L (ref 0.4–4)

## 2019-12-09 PROCEDURE — 84443 ASSAY THYROID STIM HORMONE: CPT | Performed by: PHYSICIAN ASSISTANT

## 2019-12-09 PROCEDURE — 99397 PER PM REEVAL EST PAT 65+ YR: CPT | Performed by: PHYSICIAN ASSISTANT

## 2019-12-09 PROCEDURE — 82565 ASSAY OF CREATININE: CPT | Performed by: PHYSICIAN ASSISTANT

## 2019-12-09 PROCEDURE — 36415 COLL VENOUS BLD VENIPUNCTURE: CPT | Performed by: PHYSICIAN ASSISTANT

## 2019-12-09 PROCEDURE — 82043 UR ALBUMIN QUANTITATIVE: CPT | Performed by: PHYSICIAN ASSISTANT

## 2019-12-09 PROCEDURE — 80061 LIPID PANEL: CPT | Performed by: PHYSICIAN ASSISTANT

## 2019-12-09 PROCEDURE — 82947 ASSAY GLUCOSE BLOOD QUANT: CPT | Performed by: PHYSICIAN ASSISTANT

## 2019-12-09 RX ORDER — LEVOTHYROXINE SODIUM 112 UG/1
112 TABLET ORAL DAILY
Qty: 90 TABLET | Refills: 3 | Status: SHIPPED | OUTPATIENT
Start: 2019-12-09 | End: 2020-12-11

## 2019-12-09 RX ORDER — SIMVASTATIN 10 MG
10 TABLET ORAL AT BEDTIME
Qty: 90 TABLET | Refills: 3 | Status: SHIPPED | OUTPATIENT
Start: 2019-12-09 | End: 2020-11-30

## 2019-12-09 RX ORDER — METOPROLOL SUCCINATE 25 MG/1
25 TABLET, EXTENDED RELEASE ORAL 2 TIMES DAILY
Qty: 180 TABLET | Refills: 3 | Status: SHIPPED | OUTPATIENT
Start: 2019-12-09 | End: 2019-12-24

## 2019-12-09 SDOH — HEALTH STABILITY: MENTAL HEALTH: HOW OFTEN DO YOU HAVE A DRINK CONTAINING ALCOHOL?: NEVER

## 2019-12-09 ASSESSMENT — MIFFLIN-ST. JEOR: SCORE: 1380.84

## 2019-12-09 ASSESSMENT — ACTIVITIES OF DAILY LIVING (ADL): CURRENT_FUNCTION: NO ASSISTANCE NEEDED

## 2019-12-09 NOTE — PATIENT INSTRUCTIONS
Patient Education   Personalized Prevention Plan  You are due for the preventive services outlined below.  Your care team is available to assist you in scheduling these services.  If you have already completed any of these items, please share that information with your care team to update in your medical record.  Health Maintenance Due   Topic Date Due     Discuss Advance Care Planning  1945     PHQ-2  01/01/2019     Thyroid Function Lab  11/01/2019     FALL RISK ASSESSMENT  12/03/2019     Annual Wellness Visit  12/03/2019     Zoster (Shingles) Vaccine (3 of 3) 12/22/2019

## 2019-12-09 NOTE — PROGRESS NOTES
"SUBJECTIVE:   Jenna Kaur is a 73 year old female who presents for Preventive Visit.    Are you in the first 12 months of your Medicare coverage?  No    Healthy Habits:    In general, how would you rate your overall health?  Excellent    Frequency of exercise:  None    Duration of exercise:  Less than 15 minutes    Do you usually eat at least 4 servings of fruit and vegetables a day, include whole grains    & fiber and avoid regularly eating high fat or \"junk\" foods?  Yes (2-3 servings )    Taking medications regularly:  Yes    Barriers to taking medications:  Not applicable    Medication side effects:  Not applicable    Ability to successfully perform activities of daily living:  No assistance needed    Home Safety:  Lack of grab bars in the bathroom    Hearing Impairment:  No hearing concerns    In the past 6 months, have you been bothered by leaking of urine?  No    In general, how would you rate your overall mental or emotional health?  Excellent      PHQ-2 Total Score:    Additional concerns today:  No    Patient informed that anything we discuss that is not related to preventative medicine, may be billed for; patient verbalizes understanding.      Do you feel safe in your environment? Yes    Have you ever done Advance Care Planning? (For example, a Health Directive, POLST, or a discussion with a medical provider or your loved ones about your wishes): No, advance care planning information given to patient to review.  Patient plans to discuss their wishes with loved ones or provider.        Fall risk  Fallen 2 or more times in the past year?: No  Any fall with injury in the past year?: No    Cognitive Screening   1) Repeat 3 items (Leader, Season, Table)    2) Clock draw: NORMAL  3) 3 item recall: Recalls 3 objects  Results: 3 items recalled: COGNITIVE IMPAIRMENT LESS LIKELY    Mini-CogTM Copyright JUANITA Serna. Licensed by the author for use in [facility]; reprinted with permission (steph@.[email or web address]). " "All rights reserved.      Do you have sleep apnea, excessive snoring or daytime drowsiness?: no    Reviewed and updated as needed this visit by clinical staff  Tobacco  Allergies  Meds  Med Hx  Soc Hx        Reviewed and updated as needed this visit by Provider        Social History     Tobacco Use     Smoking status: Never Smoker     Smokeless tobacco: Never Used   Substance Use Topics     Alcohol use: Never     Frequency: Never     If you drink alcohol do you typically have >3 drinks per day or >7 drinks per week? No    No flowsheet data found.      Current providers sharing in care for this patient include:   Patient Care Team:  Kenna Garcia PA-C as PCP - General (Physician Assistant)  Kenna Garcia PA-C as Assigned PCP    The following health maintenance items are reviewed in Epic and correct as of today:  Health Maintenance   Topic Date Due     MICROALBUMIN  1945     CREATININE  1945     ADVANCE CARE PLANNING  1945     PHQ-2  01/01/2019     LIPID  11/01/2019     TSH W/FREE T4 REFLEX  11/01/2019     FALL RISK ASSESSMENT  12/03/2019     MEDICARE ANNUAL WELLNESS VISIT  12/03/2019     ZOSTER IMMUNIZATION (3 of 3) 12/22/2019     MAMMO SCREENING  07/12/2021     DTAP/TDAP/TD IMMUNIZATION (2 - Td) 11/17/2021     DEXA  09/23/2022     COLONOSCOPY  04/03/2027     INFLUENZA VACCINE  Completed     PNEUMOCOCCAL IMMUNIZATION 65+ LOW/MEDIUM RISK  Completed     IPV IMMUNIZATION  Aged Out     MENINGITIS IMMUNIZATION  Aged Out     Lab work is in process    Review of Systems  Other than what is noted in the HPI and PMH a complete review of systems is otherwise negative including: Constitutional, HEENT, endocrine, cardiovascular, respiratory, GI/, musculoskeletal, neuro, and psychiatric.     OBJECTIVE:   BP (!) 156/84   Pulse 69   Temp 97.2  F (36.2  C) (Tympanic)   Resp 16   Ht 1.626 m (5' 4\")   Wt 90.1 kg (198 lb 9.6 oz)   SpO2 96%   Breastfeeding No   BMI 34.09 kg/m   Estimated " "body mass index is 34.09 kg/m  as calculated from the following:    Height as of this encounter: 1.626 m (5' 4\").    Weight as of this encounter: 90.1 kg (198 lb 9.6 oz).  Physical Exam  GENERAL: healthy, alert and no distress  EYES: Eyes grossly normal to inspection, PERRL and conjunctivae and sclerae normal  HENT: ear canals and TM's normal, nose and mouth without ulcers or lesions  NECK: no adenopathy, no asymmetry, masses, or scars and thyroid normal to palpation  RESP: lungs clear to auscultation - no rales, rhonchi or wheezes  BREAST: normal without masses, tenderness or nipple discharge and no palpable axillary masses or adenopathy  CV: regular rates and rhythm, normal S1 S2, no S3 or S4, no murmur, click or rub and bruit heard right carotid  ABDOMEN: soft, nontender, no hepatosplenomegaly, no masses and bowel sounds normal  MS: no gross musculoskeletal defects noted, no edema  SKIN: no suspicious lesions or rashes  NEURO: Normal strength and tone, mentation intact and speech normal  PSYCH: mentation appears normal, affect normal/bright    ASSESSMENT / PLAN:       ICD-10-CM    1. Encounter for Medicare annual wellness exam Z00.00 Glucose   2. Hyperlipidemia LDL goal <130 E78.5 Lipid panel reflex to direct LDL Fasting     simvastatin (ZOCOR) 10 MG tablet   3. Hypothyroidism due to acquired atrophy of thyroid E03.4 TSH WITH FREE T4 REFLEX     levothyroxine (SYNTHROID/LEVOTHROID) 112 MCG tablet   4. Essential hypertension with goal blood pressure less than 140/90 I10 Albumin Random Urine Quantitative with Creat Ratio     CREATININE     metoprolol succinate ER (TOPROL-XL) 25 MG 24 hr tablet   5. Bruit of right carotid artery R09.89 US Carotid Bilateral       1) Screenings discussed    2-4) Routine labs today. Meds renewed, no changes. She will return for a blood pressure recheck in a couple weeks on ancillary.    5) Will obtain a carotid US.      COUNSELING:  Reviewed preventive health counseling, as reflected in " "patient instructions    Estimated body mass index is 34.09 kg/m  as calculated from the following:    Height as of this encounter: 1.626 m (5' 4\").    Weight as of this encounter: 90.1 kg (198 lb 9.6 oz).     reports that she has never smoked. She has never used smokeless tobacco.    Appropriate preventive services were discussed with this patient, including applicable screening as appropriate for cardiovascular disease, diabetes, osteopenia/osteoporosis, and glaucoma.  As appropriate for age/gender, discussed screening for colorectal cancer, prostate cancer, breast cancer, and cervical cancer. Checklist reviewing preventive services available has been given to the patient.    Reviewed patients plan of care and provided an AVS. The Basic Care Plan (routine screening as documented in Health Maintenance) for Jenna meets the Care Plan requirement. This Care Plan has been established and reviewed with the Patient.    Counseling Resources:  ATP IV Guidelines  Pooled Cohorts Equation Calculator  Breast Cancer Risk Calculator  FRAX Risk Assessment  ICSI Preventive Guidelines  Dietary Guidelines for Americans, 2010  USDA's MyPlate  ASA Prophylaxis  Lung CA Screening    Kenna Garcia PA-C  [facility]    Identified Health Risks:  "

## 2019-12-10 DIAGNOSIS — R94.4 DECREASED GFR: Primary | ICD-10-CM

## 2019-12-16 ENCOUNTER — ANCILLARY PROCEDURE (OUTPATIENT)
Dept: ULTRASOUND IMAGING | Facility: CLINIC | Age: 74
End: 2019-12-16
Attending: PHYSICIAN ASSISTANT
Payer: COMMERCIAL

## 2019-12-16 DIAGNOSIS — R09.89 BRUIT OF RIGHT CAROTID ARTERY: ICD-10-CM

## 2019-12-16 PROCEDURE — 93880 EXTRACRANIAL BILAT STUDY: CPT

## 2019-12-23 ENCOUNTER — ALLIED HEALTH/NURSE VISIT (OUTPATIENT)
Dept: NURSING | Facility: CLINIC | Age: 74
End: 2019-12-23
Payer: COMMERCIAL

## 2019-12-23 VITALS — HEART RATE: 75 BPM | DIASTOLIC BLOOD PRESSURE: 82 MMHG | SYSTOLIC BLOOD PRESSURE: 148 MMHG

## 2019-12-23 DIAGNOSIS — Z01.30 BP CHECK: Primary | ICD-10-CM

## 2019-12-23 PROCEDURE — 99207 ZZC NO CHARGE NURSE ONLY: CPT

## 2019-12-23 NOTE — PROGRESS NOTES
Jenna Kaur is a 73 year old patient who comes in today for a Blood Pressure check.  Initial  BP (!) 148/82   Pulse 75      2nd /82 Pulse 75  Disposition: results routed to provider    Simin Hardin CMA

## 2019-12-23 NOTE — TELEPHONE ENCOUNTER
DIAGNOSIS:    DATE RECEIVED: 1.8.20   NOTES STATUS DETAILS   OFFICE NOTE from referring provider Internal 12.17.19, 12.9.19  Kenna Garcia PA-C   OFFICE NOTE from other specialist N/A    OPERATIVE REPORT N/A    MEDICATION LIST Internal    PERTINENT LABS Internal    CTA (CT ANGIOGRAPHY) N/A    CT N/A    MRI N/A    ULTRASOUND Internal 12.16.19 Bilateral Carotid

## 2019-12-26 ENCOUNTER — DOCUMENTATION ONLY (OUTPATIENT)
Dept: OTHER | Facility: CLINIC | Age: 74
End: 2019-12-26

## 2020-01-08 ENCOUNTER — PRE VISIT (OUTPATIENT)
Dept: VASCULAR SURGERY | Facility: CLINIC | Age: 75
End: 2020-01-08

## 2020-01-27 ENCOUNTER — ALLIED HEALTH/NURSE VISIT (OUTPATIENT)
Dept: NURSING | Facility: CLINIC | Age: 75
End: 2020-01-27
Payer: COMMERCIAL

## 2020-01-27 VITALS — HEART RATE: 67 BPM | SYSTOLIC BLOOD PRESSURE: 122 MMHG | DIASTOLIC BLOOD PRESSURE: 70 MMHG

## 2020-01-27 DIAGNOSIS — Z01.30 BP CHECK: Primary | ICD-10-CM

## 2020-01-27 PROCEDURE — 99207 ZZC NO CHARGE NURSE ONLY: CPT

## 2020-01-27 NOTE — PROGRESS NOTES
Jenna Kaur is a 74 year old patient who comes in today for a Blood Pressure check.  Initial  /70   Pulse 67        Disposition: results routed to provider

## 2020-03-15 ENCOUNTER — HEALTH MAINTENANCE LETTER (OUTPATIENT)
Age: 75
End: 2020-03-15

## 2020-06-19 DIAGNOSIS — R94.4 DECREASED GFR: ICD-10-CM

## 2020-06-19 LAB
CREAT SERPL-MCNC: 0.88 MG/DL (ref 0.52–1.04)
GFR SERPL CREATININE-BSD FRML MDRD: 63 ML/MIN/{1.73_M2}

## 2020-06-19 PROCEDURE — 36415 COLL VENOUS BLD VENIPUNCTURE: CPT | Performed by: PHYSICIAN ASSISTANT

## 2020-06-19 PROCEDURE — 82565 ASSAY OF CREATININE: CPT | Performed by: PHYSICIAN ASSISTANT

## 2020-06-30 ENCOUNTER — OFFICE VISIT (OUTPATIENT)
Dept: PODIATRY | Facility: CLINIC | Age: 75
End: 2020-06-30
Attending: PHYSICIAN ASSISTANT
Payer: COMMERCIAL

## 2020-06-30 VITALS
HEART RATE: 68 BPM | SYSTOLIC BLOOD PRESSURE: 130 MMHG | DIASTOLIC BLOOD PRESSURE: 80 MMHG | WEIGHT: 197 LBS | BODY MASS INDEX: 33.81 KG/M2

## 2020-06-30 DIAGNOSIS — M19.079 ARTHRITIS OF FOOT: Primary | ICD-10-CM

## 2020-06-30 PROCEDURE — 99203 OFFICE O/P NEW LOW 30 MIN: CPT | Performed by: PODIATRIST

## 2020-06-30 NOTE — LETTER
6/30/2020         RE: Jenna Kaur  [address]  [place] [address]        Dear Colleague,    Thank you for referring your patient, Jenna Kaur, to the [facility] [place]. Please see a copy of my visit note below.    S:  Patient seen today in consult from Kenna Garcia and complains of foot pain.  Points to dorsum of second tarsometatarsal joints left>R.  Has had this for 1 years.  Describes it as a burning pain.  Aggrevated by activity and relieved by rest.  Positive history of post static dyskinesia.  Retired.  Slippers around the house.  Denies trauma, ecchymosis, numbness, weakness.      ROS:  A 10-point review of systems was performed and is positive for that noted in the HPI and as seen below.  All other areas are negative.      No Known Allergies    Current Outpatient Medications   Medication Sig Dispense Refill     aspirin 81 MG EC tablet Take 81 mg by mouth daily       Calcium Carbonate-Vitamin D (CALCIUM 500 + D PO) Take  by mouth daily. Takes 2 tablets       carvedilol (COREG) 12.5 MG tablet Take 1 tablet (12.5 mg) by mouth 2 times daily (with meals) 180 tablet 1     CHOLESTYRAMINE LIGHT PO Take  by mouth.       levothyroxine (SYNTHROID/LEVOTHROID) 112 MCG tablet Take 1 tablet (112 mcg) by mouth daily 90 tablet 3     Omega-3 Fatty Acids (FISH OIL) 1200 MG capsule Take 1 capsule by mouth daily.       polyethylene glycol (MIRALAX/GLYCOLAX) powder as needed       simvastatin (ZOCOR) 10 MG tablet Take 1 tablet (10 mg) by mouth At Bedtime 90 tablet 3       Patient Active Problem List   Diagnosis     Hypothyroidism due to acquired atrophy of thyroid     Hyperlipidemia LDL goal <130     Essential hypertension with goal blood pressure less than 140/90     Osteopenia of both hips     Nonrheumatic aortic valve stenosis     Decreased GFR       No past medical history on file.    No past surgical history on file.    Family History   Problem Relation Age of Onset      Breast Cancer Mother 70     Colon Cancer Father 59     Arrhythmia Brother      Sleep Apnea Brother        Social History     Tobacco Use     Smoking status: Never Smoker     Smokeless tobacco: Never Used   Substance Use Topics     Alcohol use: Never     Frequency: Never         O: /80   Pulse 68   Wt 89.4 kg (197 lb)   BMI 33.81 kg/m  .      Constitutional/ general:  Pt is in no apparent distress, appears well-nourished.  Cooperative with history and physical exam.     Psych:  The patient answered questions appropriately.  Normal affect.  Seems to have reasonable expectations, in terms of treatment.     Eyes:  Visual scanning/ tracking without deficit.     Ears:  Response to auditory stimuli is normal.  negative hearing aid devices.  Auricles in proper alignment.     Lymphatic:  Popliteal lymph nodes not enlarged.     Lungs:  Non labored breathing, non labored speech. No cough.  No audible wheezing. Even, quiet breathing.       Vascular:  positive pedal pulses bilaterally for both the DP and PT arteries.  CFT < 3 sec.  positive ankle edema.  positive pedal hair growth.    Neuro:  Alert and oriented x 3. Coordinated gait.  Light touch sensation is intact to the L4, L5, S1 distributions. No obvious deficits.  No evidence of neurological-based weakness, spasticity, or contracture in the lower extremities.      Derm: Normal texture and turgor.  No erythema, ecchymosis, or cyanosis.      Musculoskeletal:  Normal arch with weightbearing.  No forefoot or rear foot deformities noted.  MS 5/5 all compartments.  Normal ROM all fore foot and rearfoot joints.  No equinus.  Pain with palpation and ROM bilateral second tarsometatarsal joint L>R  No pain with stressing any muscle compartments.  No erythema edema or ecchymosis or masses noted.  Xrays show decreased joint and subchondral sclerosis of the second tarsometatarsal joint.  There is dorsal bossing left>R.    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    P:  X-rays from past personally reviewed.  Explained to patient she has arthritis in thse joints.  Discussed  mechanics of pathology.    Discussed importance of wearing a good stiff shoe at all times to decrease symptoms.  Made numerous suggestions.  Patient will get good house shoes.  Avoid activities that bother this.  Tylenol as needed for pain.  Discussed other treatment options of not improving.  RETURN TO CLINIC PRN.  Thank you for allowing me participate in the care of this patient.        David Hong DPM DPM, FACFAS         Again, thank you for allowing me to participate in the care of your patient.        Sincerely,        David Hong DPM

## 2020-06-30 NOTE — PROGRESS NOTES
S:  Patient seen today in consult from Kenna Garcia and complains of foot pain.  Points to dorsum of second tarsometatarsal joints left>R.  Has had this for 1 years.  Describes it as a burning pain.  Aggrevated by activity and relieved by rest.  Positive history of post static dyskinesia.  Retired.  Slippers around the house.  Denies trauma, ecchymosis, numbness, weakness.      ROS:  A 10-point review of systems was performed and is positive for that noted in the HPI and as seen below.  All other areas are negative.      No Known Allergies    Current Outpatient Medications   Medication Sig Dispense Refill     aspirin 81 MG EC tablet Take 81 mg by mouth daily       Calcium Carbonate-Vitamin D (CALCIUM 500 + D PO) Take  by mouth daily. Takes 2 tablets       carvedilol (COREG) 12.5 MG tablet Take 1 tablet (12.5 mg) by mouth 2 times daily (with meals) 180 tablet 1     CHOLESTYRAMINE LIGHT PO Take  by mouth.       levothyroxine (SYNTHROID/LEVOTHROID) 112 MCG tablet Take 1 tablet (112 mcg) by mouth daily 90 tablet 3     Omega-3 Fatty Acids (FISH OIL) 1200 MG capsule Take 1 capsule by mouth daily.       polyethylene glycol (MIRALAX/GLYCOLAX) powder as needed       simvastatin (ZOCOR) 10 MG tablet Take 1 tablet (10 mg) by mouth At Bedtime 90 tablet 3       Patient Active Problem List   Diagnosis     Hypothyroidism due to acquired atrophy of thyroid     Hyperlipidemia LDL goal <130     Essential hypertension with goal blood pressure less than 140/90     Osteopenia of both hips     Nonrheumatic aortic valve stenosis     Decreased GFR       No past medical history on file.    No past surgical history on file.    Family History   Problem Relation Age of Onset     Breast Cancer Mother 70     Colon Cancer Father 59     Arrhythmia Brother      Sleep Apnea Brother        Social History     Tobacco Use     Smoking status: Never Smoker     Smokeless tobacco: Never Used   Substance Use Topics     Alcohol use: Never     Frequency:  Never         O: /80   Pulse 68   Wt 89.4 kg (197 lb)   BMI 33.81 kg/m  .      Constitutional/ general:  Pt is in no apparent distress, appears well-nourished.  Cooperative with history and physical exam.     Psych:  The patient answered questions appropriately.  Normal affect.  Seems to have reasonable expectations, in terms of treatment.     Eyes:  Visual scanning/ tracking without deficit.     Ears:  Response to auditory stimuli is normal.  negative hearing aid devices.  Auricles in proper alignment.     Lymphatic:  Popliteal lymph nodes not enlarged.     Lungs:  Non labored breathing, non labored speech. No cough.  No audible wheezing. Even, quiet breathing.       Vascular:  positive pedal pulses bilaterally for both the DP and PT arteries.  CFT < 3 sec.  positive ankle edema.  positive pedal hair growth.    Neuro:  Alert and oriented x 3. Coordinated gait.  Light touch sensation is intact to the L4, L5, S1 distributions. No obvious deficits.  No evidence of neurological-based weakness, spasticity, or contracture in the lower extremities.      Derm: Normal texture and turgor.  No erythema, ecchymosis, or cyanosis.      Musculoskeletal:  Normal arch with weightbearing.  No forefoot or rear foot deformities noted.  MS 5/5 all compartments.  Normal ROM all fore foot and rearfoot joints.  No equinus.  Pain with palpation and ROM bilateral second tarsometatarsal joint L>R  No pain with stressing any muscle compartments.  No erythema edema or ecchymosis or masses noted.  Xrays show decreased joint and subchondral sclerosis of the second tarsometatarsal joint.  There is dorsal bossing left>R.    A:  Bilateral second tarsometatarsal joint arthritis left>R.    P:  X-rays from past personally reviewed.  Explained to patient she has arthritis in thse joints.  Discussed  mechanics of pathology.    Discussed importance of wearing a good stiff shoe at all times to decrease symptoms.  Made numerous suggestions.   Patient will get good house shoes.  Avoid activities that bother this.  Tylenol as needed for pain.  Discussed other treatment options of not improving.  RETURN TO CLINIC PRN.  Thank you for allowing me participate in the care of this patient.        David Hong, ALYSSA DPM, FACFAS

## 2020-07-30 ENCOUNTER — ANCILLARY PROCEDURE (OUTPATIENT)
Dept: MAMMOGRAPHY | Facility: CLINIC | Age: 75
End: 2020-07-30
Attending: PHYSICIAN ASSISTANT
Payer: COMMERCIAL

## 2020-07-30 DIAGNOSIS — Z12.31 VISIT FOR SCREENING MAMMOGRAM: ICD-10-CM

## 2020-07-30 PROCEDURE — 77067 SCR MAMMO BI INCL CAD: CPT | Mod: TC

## 2020-09-09 DIAGNOSIS — I10 ESSENTIAL HYPERTENSION WITH GOAL BLOOD PRESSURE LESS THAN 140/90: ICD-10-CM

## 2020-09-10 RX ORDER — CARVEDILOL 12.5 MG/1
TABLET ORAL
Qty: 180 TABLET | Refills: 0 | Status: SHIPPED | OUTPATIENT
Start: 2020-09-10 | End: 2020-11-30

## 2020-09-10 NOTE — TELEPHONE ENCOUNTER
"  Prescription approved per [facility] Refill Protocol.      Requested Prescriptions   Pending Prescriptions Disp Refills     carvedilol (COREG) 12.5 MG tablet [Pharmacy Med Name: CARVEDILOL 12.5 MG Tablet] 180 tablet 0     Sig: TAKE 1 TABLET TWICE DAILY WITH MEALS       Beta-Blockers Protocol Passed - 9/9/2020 11:02 AM        Passed - Blood pressure under 140/90 in past 12 months     BP Readings from Last 3 Encounters:   06/30/20 130/80   01/27/20 122/70   12/23/19 (!) 148/82                 Passed - Patient is age 6 or older        Passed - Recent (12 mo) or future (30 days) visit within the authorizing provider's specialty     Patient has had an office visit with the authorizing provider or a provider within the authorizing providers department within the previous 12 mos or has a future within next 30 days. See \"Patient Info\" tab in inbasket, or \"Choose Columns\" in Meds & Orders section of the refill encounter.              Passed - Medication is active on med list             "

## 2020-09-21 ENCOUNTER — OFFICE VISIT (OUTPATIENT)
Dept: FAMILY MEDICINE | Facility: CLINIC | Age: 75
End: 2020-09-21
Payer: COMMERCIAL

## 2020-09-21 VITALS
RESPIRATION RATE: 16 BRPM | SYSTOLIC BLOOD PRESSURE: 136 MMHG | DIASTOLIC BLOOD PRESSURE: 74 MMHG | TEMPERATURE: 98.4 F | OXYGEN SATURATION: 99 % | HEART RATE: 65 BPM | BODY MASS INDEX: 33.3 KG/M2 | WEIGHT: 194 LBS

## 2020-09-21 DIAGNOSIS — K64.4 EXTERNAL HEMORRHOIDS: Primary | ICD-10-CM

## 2020-09-21 PROCEDURE — 99213 OFFICE O/P EST LOW 20 MIN: CPT | Mod: 25 | Performed by: PHYSICIAN ASSISTANT

## 2020-09-21 PROCEDURE — 90662 IIV NO PRSV INCREASED AG IM: CPT | Performed by: PHYSICIAN ASSISTANT

## 2020-09-21 PROCEDURE — G0008 ADMIN INFLUENZA VIRUS VAC: HCPCS | Performed by: PHYSICIAN ASSISTANT

## 2020-09-21 NOTE — PROGRESS NOTES
"Subjective     Jenna Kaur is a 74 year old female who presents to clinic today for the following health issues:    HPI       Hemorrhoids  Onset/Duration: 9/6/2020, was seen in urgent care and had external hemorrhoids  Description:   Harleen-anal lump: YES, will sometimes feel lump  Pain: None  Itching: no  Accompanying Signs & Symptoms:  Blood in stool: No blood in stool recently  Changes in stool pattern: None, does take miralax about every other day  History:   Any previous GI studies done:Colonoscopy  Family History of colon cancer: YES, father   Precipitating factors:   None  Alleviating factors:  None  Therapies tried and outcome: none    Uses a small amount of Miralax regularly  Sxs improving      Review of Systems   Constitutional, gi and gu systems are negative, except as otherwise noted.      Objective    /74   Pulse 65   Temp 98.4  F (36.9  C) (Tympanic)   Resp 16   Wt 88 kg (194 lb)   SpO2 99%   BMI 33.30 kg/m    Body mass index is 33.3 kg/m .  Physical Exam   GENERAL: healthy, alert and no distress  RECTAL (female): normal sphincter tone, no rectal masses and small external hemorrhoid noted, non tender  MS: no gross musculoskeletal defects noted, no edema  NEURO: Normal strength and tone, mentation intact and speech normal  PSYCH: mentation appears normal, affect normal/bright        Assessment & Plan     1. External hemorrhoids        Hemorrhoid is improving. Continue supportive cares as needed and regular Miralax.        BMI:   Estimated body mass index is 33.3 kg/m  as calculated from the following:    Height as of 12/9/19: 1.626 m (5' 4\").    Weight as of this encounter: 88 kg (194 lb).       Return in about 3 months (around 12/21/2020) for your annual physical.    Kenna Garcia PA-C  [facility]    "

## 2020-12-10 ASSESSMENT — ENCOUNTER SYMPTOMS
FREQUENCY: 0
HEARTBURN: 0
DYSURIA: 0
DIZZINESS: 0
DIARRHEA: 0
COUGH: 0
HEADACHES: 0
NAUSEA: 0
BREAST MASS: 0
JOINT SWELLING: 0
HEMATOCHEZIA: 0
SHORTNESS OF BREATH: 0
EYE PAIN: 0
SORE THROAT: 0
NERVOUS/ANXIOUS: 0
CHILLS: 0
ABDOMINAL PAIN: 0
HEMATURIA: 0
ARTHRALGIAS: 0
PARESTHESIAS: 0
PALPITATIONS: 0
CONSTIPATION: 0
MYALGIAS: 0
WEAKNESS: 0
FEVER: 0

## 2020-12-10 ASSESSMENT — ACTIVITIES OF DAILY LIVING (ADL): CURRENT_FUNCTION: NO ASSISTANCE NEEDED

## 2020-12-10 NOTE — PATIENT INSTRUCTIONS
Preventive Health Recommendations    See your health care provider every year to    Review health changes.     Discuss preventive care.      Review your medicines if your doctor has prescribed any.      You no longer need a yearly Pap test unless you've had an abnormal Pap test in the past 10 years. If you have vaginal symptoms, such as bleeding or discharge, be sure to talk with your provider about a Pap test.      Every 1 to 2 years, have a mammogram.  If you are over 69, talk with your health care provider about whether or not you want to continue having screening mammograms.      Every 10 years, have a colonoscopy. Or, have a yearly FIT test (stool test). These exams will check for colon cancer.       Have a cholesterol test every 5 years, or more often if your doctor advises it.       Have a diabetes test (fasting glucose) every three years. If you are at risk for diabetes, you should have this test more often.       At age 65, have a bone density scan (DEXA) to check for osteoporosis (brittle bone disease).    Shots:    Get a flu shot each year.    Get a tetanus shot every 10 years.    Talk to your doctor about your pneumonia vaccines. There are now two you should receive - Pneumovax (PPSV 23) and Prevnar (PCV 13).    Talk to your pharmacist about the shingles vaccine.    Talk to your doctor about the hepatitis B vaccine.    Nutrition:     Eat at least 5 servings of fruits and vegetables each day.      Eat whole-grain bread, whole-wheat pasta and brown rice instead of white grains and rice.      Get adequate about Calcium and Vitamin D.     Lifestyle    Exercise at least 150 minutes a week (30 minutes a day, 5 days a week). This will help you control your weight and prevent disease.      Limit alcohol to one drink per day.      No smoking.       Wear sunscreen to prevent skin cancer.       See your dentist twice a year for an exam and cleaning.      See your eye doctor every 1 to 2 years to screen for  conditions such as glaucoma, macular degeneration, cataracts, etc.    Personalized Prevention Plan  You are due for the preventive services outlined below.  Your care team is available to assist you in scheduling these services.  If you have already completed any of these items, please share that information with your care team to update in your medical record.    Health Maintenance Due   Topic Date Due     Hepatitis C Screening  12/27/1963     Cholesterol Lab  12/09/2020     Kidney Microalbumin Urine Test  12/09/2020     FALL RISK ASSESSMENT  12/09/2020     Annual Wellness Visit  12/09/2020     Thyroid Function Lab  12/09/2020

## 2020-12-10 NOTE — PROGRESS NOTES
"   SUBJECTIVE:   CC: Jenna Kaur is an 74 year old woman who presents for preventive health visit.     Patient has been advised of split billing requirements and indicates understanding: Yes  Healthy Habits:     In general, how would you rate your overall health?  Excellent    Frequency of exercise:  1 day/week    Duration of exercise:  15-30 minutes    Do you usually eat at least 4 servings of fruit and vegetables a day, include whole grains    & fiber and avoid regularly eating high fat or \"junk\" foods?  No    Taking medications regularly:  Yes    Medication side effects:  None    Ability to successfully perform activities of daily living:  No assistance needed    Home Safety:  No safety concerns identified    Hearing Impairment:  No hearing concerns    In the past 6 months, have you been bothered by leaking of urine?  No    In general, how would you rate your overall mental or emotional health?  Excellent      PHQ-2 Total Score: 0    Additional concerns today:  No      PROBLEMS TO ADD ON...  Fasting    Needing refills and/or labs for:  Hypertension  Is blood pressure being checked at home? Yes  Medication side effects? No    Hyperlipidemia  Following a low fat diet? Yes  Medication side effects? No    Hypothyroidism   Any hyper or hypo-thyroid symptoms? No    -------------------------------------    Today's PHQ-2 Score:   PHQ-2 ( 1999 [employer]) 12/10/2020   Q1: Little interest or pleasure in doing things 0   Q2: Feeling down, depressed or hopeless 0   PHQ-2 Score 0   Q1: Little interest or pleasure in doing things Not at all   Q2: Feeling down, depressed or hopeless Not at all   PHQ-2 Score 0       Abuse: Current or Past (Physical, Sexual or Emotional) - No  Do you feel safe in your environment? Yes        Social History     Tobacco Use     Smoking status: Never Smoker     Smokeless tobacco: Never Used   Substance Use Topics     Alcohol use: Never     Frequency: Never         Alcohol Use 12/10/2020   Prescreen: " ">3 drinks/day or >7 drinks/week? No   Prescreen: >3 drinks/day or >7 drinks/week? -       Reviewed orders with patient.  Reviewed health maintenance and updated orders accordingly - Yes  Lab work is in process    Mammogram Screening: Patient over age 50, mutual decision to screen reflected in health maintenance.    Pertinent mammograms are reviewed under the imaging tab.  History of abnormal Pap smear: NO - age 65 - see link Cervical Cytology Screening Guidelines     Reviewed and updated as needed this visit by clinical staff  Tobacco  Allergies  Meds              Reviewed and updated as needed this visit by Provider                No past medical history on file.     Review of Systems   Constitutional: Negative for chills and fever.   HENT: Negative for congestion, ear pain, hearing loss and sore throat.    Eyes: Negative for pain and visual disturbance.   Respiratory: Negative for cough and shortness of breath.    Cardiovascular: Negative for chest pain, palpitations and peripheral edema.   Gastrointestinal: Negative for abdominal pain, constipation, diarrhea, heartburn, hematochezia and nausea.   Breasts:  Negative for tenderness, breast mass and discharge.   Genitourinary: Negative for dysuria, frequency, genital sores, hematuria, pelvic pain, urgency, vaginal bleeding and vaginal discharge.   Musculoskeletal: Negative for arthralgias, joint swelling and myalgias.   Skin: Negative for rash.   Neurological: Negative for dizziness, weakness, headaches and paresthesias.   Psychiatric/Behavioral: Negative for mood changes. The patient is not nervous/anxious.      OBJECTIVE:   BP (!) 140/88   Pulse 70   Temp 98.5  F (36.9  C) (Tympanic)   Resp 20   Ht 1.635 m (5' 4.37\")   Wt 88.7 kg (195 lb 7 oz)   SpO2 96%   BMI 33.16 kg/m    Physical Exam  GENERAL: healthy, alert and no distress  EYES: Eyes grossly normal to inspection, PERRL and conjunctivae and sclerae normal  HENT: ear canals and TM's normal, nose and " "mouth without ulcers or lesions  NECK: no adenopathy, no asymmetry, masses, or scars and thyroid normal to palpation  RESP: lungs clear to auscultation - no rales, rhonchi or wheezes  BREAST: normal without masses, tenderness or nipple discharge and no palpable axillary masses or adenopathy  CV: regular rates and rhythm, grade 3/6 systolic murmur heard best over the right sternal border and no bruits heard   ABDOMEN: soft, nontender, no hepatosplenomegaly, no masses and bowel sounds normal  MS: no gross musculoskeletal defects noted, no edema  SKIN: no suspicious lesions or rashes  NEURO: Normal strength and tone, mentation intact and speech normal  PSYCH: mentation appears normal, affect normal/bright    ASSESSMENT/PLAN:       ICD-10-CM    1. Routine general medical examination at a health care facility  Z00.00    2. Need for hepatitis C screening test  Z11.59 Hepatitis C Screen Reflex to HCV RNA Quant and Genotype   3. Screening for hyperlipidemia  Z13.220    4. Essential hypertension with goal blood pressure less than 140/90  I10 Albumin Random Urine Quantitative with Creat Ratio     Creatinine   5. Hyperlipidemia LDL goal <130  E78.5 Lipid panel reflex to direct LDL Fasting     simvastatin (ZOCOR) 10 MG tablet   6. Hypothyroidism due to acquired atrophy of thyroid  E03.4 TSH WITH FREE T4 REFLEX     levothyroxine (SYNTHROID/LEVOTHROID) 112 MCG tablet       1-3) Screenings discussed    4-6) Routine labs today. Will have her return on a day she's taken her blood pressure medication, ancillary ok. Meds renewed, no changes.      Patient has been advised of split billing requirements and indicates understanding: Yes  COUNSELING:  Reviewed preventive health counseling, as reflected in patient instructions    Estimated body mass index is 33.16 kg/m  as calculated from the following:    Height as of this encounter: 1.635 m (5' 4.37\").    Weight as of this encounter: 88.7 kg (195 lb 7 oz).    She reports that she has " never smoked. She has never used smokeless tobacco.      Counseling Resources:  ATP IV Guidelines  Pooled Cohorts Equation Calculator  Breast Cancer Risk Calculator  BRCA-Related Cancer Risk Assessment: FHS-7 Tool  FRAX Risk Assessment  ICSI Preventive Guidelines  Dietary Guidelines for Americans, 2010  USDA's MyPlate  ASA Prophylaxis  Lung CA Screening    TRIP Rodrigez [facility]UC[facility]

## 2020-12-11 ENCOUNTER — OFFICE VISIT (OUTPATIENT)
Dept: FAMILY MEDICINE | Facility: CLINIC | Age: 75
End: 2020-12-11
Payer: COMMERCIAL

## 2020-12-11 VITALS
RESPIRATION RATE: 20 BRPM | OXYGEN SATURATION: 96 % | TEMPERATURE: 98.5 F | HEIGHT: 64 IN | BODY MASS INDEX: 33.37 KG/M2 | WEIGHT: 195.44 LBS | SYSTOLIC BLOOD PRESSURE: 140 MMHG | HEART RATE: 70 BPM | DIASTOLIC BLOOD PRESSURE: 88 MMHG

## 2020-12-11 DIAGNOSIS — I10 ESSENTIAL HYPERTENSION WITH GOAL BLOOD PRESSURE LESS THAN 140/90: ICD-10-CM

## 2020-12-11 DIAGNOSIS — E78.5 HYPERLIPIDEMIA LDL GOAL <130: ICD-10-CM

## 2020-12-11 DIAGNOSIS — E03.4 HYPOTHYROIDISM DUE TO ACQUIRED ATROPHY OF THYROID: ICD-10-CM

## 2020-12-11 DIAGNOSIS — Z00.00 ROUTINE GENERAL MEDICAL EXAMINATION AT A HEALTH CARE FACILITY: Primary | ICD-10-CM

## 2020-12-11 DIAGNOSIS — Z13.220 SCREENING FOR HYPERLIPIDEMIA: ICD-10-CM

## 2020-12-11 DIAGNOSIS — Z11.59 NEED FOR HEPATITIS C SCREENING TEST: ICD-10-CM

## 2020-12-11 LAB
CHOLEST SERPL-MCNC: 162 MG/DL
CREAT SERPL-MCNC: 0.81 MG/DL (ref 0.52–1.04)
CREAT UR-MCNC: 115 MG/DL
GFR SERPL CREATININE-BSD FRML MDRD: 70 ML/MIN/{1.73_M2}
HDLC SERPL-MCNC: 65 MG/DL
LDLC SERPL CALC-MCNC: 76 MG/DL
MICROALBUMIN UR-MCNC: 16 MG/L
MICROALBUMIN/CREAT UR: 14.26 MG/G CR (ref 0–25)
NONHDLC SERPL-MCNC: 97 MG/DL
TRIGL SERPL-MCNC: 106 MG/DL
TSH SERPL DL<=0.005 MIU/L-ACNC: 0.51 MU/L (ref 0.4–4)

## 2020-12-11 PROCEDURE — 82043 UR ALBUMIN QUANTITATIVE: CPT | Performed by: PHYSICIAN ASSISTANT

## 2020-12-11 PROCEDURE — 36415 COLL VENOUS BLD VENIPUNCTURE: CPT | Performed by: PHYSICIAN ASSISTANT

## 2020-12-11 PROCEDURE — 99214 OFFICE O/P EST MOD 30 MIN: CPT | Mod: 25 | Performed by: PHYSICIAN ASSISTANT

## 2020-12-11 PROCEDURE — 99397 PER PM REEVAL EST PAT 65+ YR: CPT | Performed by: PHYSICIAN ASSISTANT

## 2020-12-11 PROCEDURE — 80061 LIPID PANEL: CPT | Performed by: PHYSICIAN ASSISTANT

## 2020-12-11 PROCEDURE — 82565 ASSAY OF CREATININE: CPT | Performed by: PHYSICIAN ASSISTANT

## 2020-12-11 PROCEDURE — 84443 ASSAY THYROID STIM HORMONE: CPT | Performed by: PHYSICIAN ASSISTANT

## 2020-12-11 PROCEDURE — 86803 HEPATITIS C AB TEST: CPT | Performed by: PHYSICIAN ASSISTANT

## 2020-12-11 RX ORDER — LEVOTHYROXINE SODIUM 112 UG/1
112 TABLET ORAL DAILY
Qty: 90 TABLET | Refills: 3 | Status: SHIPPED | OUTPATIENT
Start: 2020-12-11 | End: 2022-01-28

## 2020-12-11 RX ORDER — SIMVASTATIN 10 MG
10 TABLET ORAL AT BEDTIME
Qty: 90 TABLET | Refills: 3 | Status: SHIPPED | OUTPATIENT
Start: 2020-12-11 | End: 2022-01-28

## 2020-12-11 ASSESSMENT — MIFFLIN-ST. JEOR: SCORE: 1367.38

## 2020-12-12 LAB — HCV AB SERPL QL IA: NONREACTIVE

## 2021-01-07 ENCOUNTER — TELEPHONE (OUTPATIENT)
Dept: FAMILY MEDICINE | Facility: CLINIC | Age: 76
End: 2021-01-07

## 2021-01-07 ENCOUNTER — ALLIED HEALTH/NURSE VISIT (OUTPATIENT)
Dept: NURSING | Facility: CLINIC | Age: 76
End: 2021-01-07
Payer: COMMERCIAL

## 2021-01-07 VITALS — DIASTOLIC BLOOD PRESSURE: 75 MMHG | HEART RATE: 62 BPM | SYSTOLIC BLOOD PRESSURE: 138 MMHG

## 2021-01-07 DIAGNOSIS — Z01.30 BP CHECK: Primary | ICD-10-CM

## 2021-01-07 PROCEDURE — 99207 PR NO CHARGE NURSE ONLY: CPT

## 2021-01-07 NOTE — PROGRESS NOTES
Jenna Kaur is a 75 year old patient who comes in today for a Blood Pressure check.  Initial BP:  BP (!) 152/84      Data Unavailable  Disposition: results routed to provider      Jenna Kaur is a 75 year old patient who comes in today for a Blood Pressure check.  Initial BP:  /75   Pulse 62      62  Disposition: results routed to provider

## 2021-01-11 ENCOUNTER — TELEPHONE (OUTPATIENT)
Dept: FAMILY MEDICINE | Facility: CLINIC | Age: 76
End: 2021-01-11

## 2021-01-11 DIAGNOSIS — I10 ESSENTIAL HYPERTENSION WITH GOAL BLOOD PRESSURE LESS THAN 140/90: ICD-10-CM

## 2021-01-11 RX ORDER — CARVEDILOL 25 MG/1
25 TABLET ORAL 2 TIMES DAILY WITH MEALS
Qty: 180 TABLET | Refills: 0 | Status: SHIPPED | OUTPATIENT
Start: 2021-01-11 | End: 2021-01-12

## 2021-01-11 NOTE — TELEPHONE ENCOUNTER
Please call patient with the following info:    Blood pressure is elevated with her current dose. Increase carvedilol to 25mg BID and recheck a blood pressure and pulse on ancillary in 1-2 weeks. Please schedule

## 2021-01-11 NOTE — TELEPHONE ENCOUNTER
Spoke with patient, informed as below patient requested medication to be sent to [facility] not [facility]. Please resend to Rx to [facility]. Patient has enough of her 12.5 dose to finish before picking up 25 mg.   Patient will stop at [facility] to have her rechecked BP check and pulse. Patient does not want to go to [facility] for nurse visit.

## 2021-01-12 RX ORDER — CARVEDILOL 25 MG/1
25 TABLET ORAL 2 TIMES DAILY WITH MEALS
Qty: 180 TABLET | Refills: 0 | Status: SHIPPED | OUTPATIENT
Start: 2021-01-12 | End: 2021-05-12

## 2021-01-24 NOTE — TELEPHONE ENCOUNTER
Prescription approved per [facility] Refill Protocol.    
Requested Prescriptions   Pending Prescriptions Disp Refills     levothyroxine (SYNTHROID/LEVOTHROID) 112 MCG tablet 90 tablet 3     Sig: Take 1 tablet (112 mcg) by mouth daily    There is no refill protocol information for this order        simvastatin (ZOCOR) 10 MG tablet 90 tablet 3     Sig: Take 1 tablet (10 mg) by mouth At Bedtime    There is no refill protocol information for this order        metoprolol succinate ER (TOPROL-XL) 50 MG 24 hr tablet 90 tablet 3     Sig: Take 0.5 tablets (25 mg) by mouth 2 times daily    There is no refill protocol information for this order      Last Written Prescription Date:  ?  Last Fill Quantity: ?,  # refills: 3   Last office visit: 12/3/2018 with prescribing provider:  12-3-18   Future Office Visit:    
Routing refill request to provider for review/approval because:  Labs out of range:  TSH    TSH   Date Value Ref Range Status   11/01/2018 0.30 (L) 0.40 - 4.00 mU/L Final     Kalyani Shetty RN, BSN, PHN          
no abrasions, no jaundice, no lesions, no pruritis, and no rashes.

## 2021-01-25 ENCOUNTER — ALLIED HEALTH/NURSE VISIT (OUTPATIENT)
Dept: FAMILY MEDICINE | Facility: CLINIC | Age: 76
End: 2021-01-25
Payer: COMMERCIAL

## 2021-01-25 VITALS — SYSTOLIC BLOOD PRESSURE: 150 MMHG | DIASTOLIC BLOOD PRESSURE: 100 MMHG

## 2021-01-25 DIAGNOSIS — Z01.30 BP CHECK: Primary | ICD-10-CM

## 2021-01-25 PROCEDURE — 99207 PR DROP WITH A PROCEDURE: CPT | Performed by: PHYSICIAN ASSISTANT

## 2021-01-25 NOTE — PROGRESS NOTES
Jenna Kaur was evaluated at [facility] on January 25, 2021 at which time her blood pressure was:    BP Readings from Last 3 Encounters:   01/25/21 (!) 150/100   01/07/21 138/75   12/11/20 (!) 140/88     Pulse Readings from Last 3 Encounters:   01/07/21 62   12/11/20 70   09/21/20 65       Reviewed lifestyle modifications for blood pressure control and reduction: including making healthy food choices, managing weight, getting regular exercise, smoking cessation, reducing alcohol consumption, monitoring blood pressure regularly.     Symptoms: None    BP Goal:< 140/90 mmHg    BP Assessment:  BP too high    Potential Reasons for BP too high: Unknown/Other: Need additional therapy    BP Follow-Up Plan: Recheck BP in 30 days at pharmacy    Recommendation to Provider: Recommended to patient to bring in home blood pressure monitor next time and will measure with both the pharmacy's and her home blood pressure monitor to make sure she is getting accurate reading; Patients states she is only taking carvedilol, so it may be time to recommend another agent, such as an ACEI or ARB unless contraindicated    Note completed by: Raven Smith, PharmD

## 2021-01-26 ENCOUNTER — MYC MEDICAL ADVICE (OUTPATIENT)
Dept: FAMILY MEDICINE | Facility: CLINIC | Age: 76
End: 2021-01-26

## 2021-01-26 DIAGNOSIS — I10 ESSENTIAL HYPERTENSION WITH GOAL BLOOD PRESSURE LESS THAN 140/90: Primary | ICD-10-CM

## 2021-01-27 RX ORDER — LOSARTAN POTASSIUM 50 MG/1
50 TABLET ORAL DAILY
Qty: 30 TABLET | Refills: 1 | Status: SHIPPED | OUTPATIENT
Start: 2021-01-27 | End: 2021-03-11

## 2021-02-10 ENCOUNTER — ALLIED HEALTH/NURSE VISIT (OUTPATIENT)
Dept: NURSING | Facility: CLINIC | Age: 76
End: 2021-02-10
Payer: COMMERCIAL

## 2021-02-10 VITALS
HEART RATE: 68 BPM | OXYGEN SATURATION: 98 % | SYSTOLIC BLOOD PRESSURE: 120 MMHG | RESPIRATION RATE: 20 BRPM | WEIGHT: 198.25 LBS | DIASTOLIC BLOOD PRESSURE: 79 MMHG | BODY MASS INDEX: 35.12 KG/M2 | HEIGHT: 63 IN | TEMPERATURE: 98 F

## 2021-02-10 DIAGNOSIS — I10 ESSENTIAL HYPERTENSION WITH GOAL BLOOD PRESSURE LESS THAN 140/90: ICD-10-CM

## 2021-02-10 DIAGNOSIS — Z01.30 BLOOD PRESSURE CHECK: Primary | ICD-10-CM

## 2021-02-10 LAB
ANION GAP SERPL CALCULATED.3IONS-SCNC: 3 MMOL/L (ref 3–14)
BUN SERPL-MCNC: 23 MG/DL (ref 7–30)
CALCIUM SERPL-MCNC: 9.1 MG/DL (ref 8.5–10.1)
CHLORIDE SERPL-SCNC: 105 MMOL/L (ref 94–109)
CO2 SERPL-SCNC: 31 MMOL/L (ref 20–32)
CREAT SERPL-MCNC: 1 MG/DL (ref 0.52–1.04)
GFR SERPL CREATININE-BSD FRML MDRD: 54 ML/MIN/{1.73_M2}
GLUCOSE SERPL-MCNC: 160 MG/DL (ref 70–99)
POTASSIUM SERPL-SCNC: 4.5 MMOL/L (ref 3.4–5.3)
SODIUM SERPL-SCNC: 139 MMOL/L (ref 133–144)

## 2021-02-10 PROCEDURE — 99207 PR NO CHARGE NURSE ONLY: CPT

## 2021-02-10 PROCEDURE — 36415 COLL VENOUS BLD VENIPUNCTURE: CPT | Performed by: PHYSICIAN ASSISTANT

## 2021-02-10 PROCEDURE — 80048 BASIC METABOLIC PNL TOTAL CA: CPT | Performed by: PHYSICIAN ASSISTANT

## 2021-02-10 ASSESSMENT — MIFFLIN-ST. JEOR: SCORE: 1353.39

## 2021-02-10 NOTE — PROGRESS NOTES
"Jenna Kaur is a 75 year old patient who comes in today for a Blood Pressure check.  Initial BP:  /79   Pulse 68   Temp 98  F (36.7  C) (Tympanic)   Resp 20   Ht 1.6 m (5' 3\")   Wt 89.9 kg (198 lb 4 oz)   SpO2 98%   BMI 35.12 kg/m         Disposition: provider notified while patient in the clinic. Continue with current plan.    "

## 2021-03-22 ENCOUNTER — ALLIED HEALTH/NURSE VISIT (OUTPATIENT)
Dept: FAMILY MEDICINE | Facility: CLINIC | Age: 76
End: 2021-03-22
Payer: COMMERCIAL

## 2021-03-22 VITALS — DIASTOLIC BLOOD PRESSURE: 82 MMHG | SYSTOLIC BLOOD PRESSURE: 142 MMHG

## 2021-03-22 DIAGNOSIS — Z01.30 BP CHECK: Primary | ICD-10-CM

## 2021-03-22 PROCEDURE — 99207 PR NO CHARGE NURSE ONLY: CPT | Performed by: PHYSICIAN ASSISTANT

## 2021-03-22 NOTE — PROGRESS NOTES
"Jenna Kaur was evaluated at [facility] on March 22, 2021 at which time her blood pressure was:    BP Readings from Last 3 Encounters:   03/22/21 (!) 142/82   02/10/21 120/79   01/25/21 (!) 150/100     Pulse Readings from Last 3 Encounters:   02/10/21 68   01/07/21 62   12/11/20 70       Reviewed lifestyle modifications for blood pressure control and reduction: including making healthy food choices, managing weight, getting regular exercise, smoking cessation, reducing alcohol consumption, monitoring blood pressure regularly.     Symptoms: None    BP Goal:< 140/90 mmHg    BP Assessment:  BP too high    Potential Reasons for BP too high: Caffeine use within past 30 minutes    BP Follow-Up Plan: Recheck BP in 30 days at pharmacy    Recommendation to Provider: Recheck blood pressure in 30 days and reassess then; patient says her blood pressure was fine at home, and she admits to having \"two cokes\" prior to coming to the pharmacy; additionally her February blood pressure was good too.    Note completed by: Raven Smith, PharmD  "

## 2021-05-17 ENCOUNTER — ALLIED HEALTH/NURSE VISIT (OUTPATIENT)
Dept: FAMILY MEDICINE | Facility: CLINIC | Age: 76
End: 2021-05-17
Payer: COMMERCIAL

## 2021-05-17 VITALS — SYSTOLIC BLOOD PRESSURE: 156 MMHG | DIASTOLIC BLOOD PRESSURE: 86 MMHG

## 2021-05-17 DIAGNOSIS — Z01.30 BP CHECK: Primary | ICD-10-CM

## 2021-05-17 NOTE — PROGRESS NOTES
Jenna Kaur was evaluated at [facility] on May 17, 2021 at which time her blood pressure was:    BP Readings from Last 3 Encounters:   05/17/21 (!) 156/86   03/22/21 (!) 142/82   02/10/21 120/79     Pulse Readings from Last 3 Encounters:   02/10/21 68   01/07/21 62   12/11/20 70       Reviewed lifestyle modifications for blood pressure control and reduction: including making healthy food choices, managing weight, getting regular exercise, smoking cessation, reducing alcohol consumption, monitoring blood pressure regularly.     Symptoms: None    BP Goal:< 140/90 mmHg    BP Assessment:  BP at goal    Potential Reasons for BP too high: Anxiety    BP Follow-Up Plan: Recheck BP in 30 days at pharmacy    Recommendation to Provider: The patient's blood pressure at the pharmacy was 150/84.  When we used her home blood pressure machine just after, we got a reading of 156/86.  However, the patient reports much lower blood pressure readings at home (always below 140/90, sometimes as low as 101/70).  I believe she is getting nervous at the clinic/pharmacy, which is why her readings are so much higher at the clinic.    Note completed by: Raven Smith, PharmD

## 2021-05-18 DIAGNOSIS — I10 ESSENTIAL HYPERTENSION WITH GOAL BLOOD PRESSURE LESS THAN 140/90: ICD-10-CM

## 2021-05-18 NOTE — TELEPHONE ENCOUNTER
Please call patient with the following info:    Blood pressure still above goal. To confirm, is she taking carvedilol 25mg BID and losartan 50mg every day?  If so, then increase losartan to 100mg daily. And repeat blood pressure and BMP in 2 weeks. Please schedule ancillary and lab

## 2021-05-20 RX ORDER — LOSARTAN POTASSIUM 50 MG/1
50 TABLET ORAL DAILY
Qty: 90 TABLET | Refills: 2 | Status: CANCELLED | OUTPATIENT
Start: 2021-05-20

## 2021-05-20 NOTE — TELEPHONE ENCOUNTER
I called and spoke to patient, she confirms she is on the BP meds per our list; she says she brought her home BP cuff in with her to the check in the pharmacy and it matched very well (156/84 on her home cuff).    BP Readings from Last 3 Encounters:   05/17/21 (!) 156/86   03/22/21 (!) 142/82   02/10/21 120/79     She says her home BP is always normal.   On 5/18 was 121/74, pulse 62.  Yesterday:   100/65, pulse 67.    So, assume her home cuff is accurate and her BP is only high in the clinic for some reason lately.       She denies any symptoms such as headaches, blurry vision, shortness of breath.    She is hesitant to increase her BP meds as her home BP of 100/65 seemed kind of low.    Routed to PCP to advise on plan, okay to keep monitoring at home?    Simin Baca RN  [facility]

## 2021-05-24 ENCOUNTER — APPOINTMENT (OUTPATIENT)
Dept: URBAN - METROPOLITAN AREA CLINIC 252 | Age: 76
Setting detail: DERMATOLOGY
End: 2021-05-27

## 2021-05-24 VITALS — HEIGHT: 63 IN | RESPIRATION RATE: 16 BRPM | WEIGHT: 180 LBS

## 2021-05-24 DIAGNOSIS — L82.1 OTHER SEBORRHEIC KERATOSIS: ICD-10-CM

## 2021-05-24 DIAGNOSIS — L91.8 OTHER HYPERTROPHIC DISORDERS OF THE SKIN: ICD-10-CM

## 2021-05-24 DIAGNOSIS — D22 MELANOCYTIC NEVI: ICD-10-CM

## 2021-05-24 DIAGNOSIS — L81.4 OTHER MELANIN HYPERPIGMENTATION: ICD-10-CM

## 2021-05-24 DIAGNOSIS — L73.8 OTHER SPECIFIED FOLLICULAR DISORDERS: ICD-10-CM

## 2021-05-24 DIAGNOSIS — L72.8 OTHER FOLLICULAR CYSTS OF THE SKIN AND SUBCUTANEOUS TISSUE: ICD-10-CM

## 2021-05-24 PROBLEM — D48.5 NEOPLASM OF UNCERTAIN BEHAVIOR OF SKIN: Status: ACTIVE | Noted: 2021-05-24

## 2021-05-24 PROCEDURE — OTHER SKIN TAG REMOVAL: OTHER

## 2021-05-24 PROCEDURE — OTHER COUNSELING: OTHER

## 2021-05-24 PROCEDURE — 11200 RMVL SKIN TAGS UP TO&INC 15: CPT | Mod: 59

## 2021-05-24 PROCEDURE — OTHER BIOPSY BY SHAVE METHOD: OTHER

## 2021-05-24 PROCEDURE — OTHER SUNSCREEN RECOMMENDATIONS: OTHER

## 2021-05-24 PROCEDURE — 11102 TANGNTL BX SKIN SINGLE LES: CPT

## 2021-05-24 PROCEDURE — 88305 TISSUE EXAM BY PATHOLOGIST: CPT

## 2021-05-24 PROCEDURE — 99203 OFFICE O/P NEW LOW 30 MIN: CPT | Mod: 25

## 2021-05-24 PROCEDURE — OTHER PATHOLOGY BILLING: OTHER

## 2021-05-24 ASSESSMENT — LOCATION ZONE DERM
LOCATION ZONE: FACE
LOCATION ZONE: TRUNK
LOCATION ZONE: ARM
LOCATION ZONE: AXILLAE
LOCATION ZONE: NOSE
LOCATION ZONE: HAND

## 2021-05-24 ASSESSMENT — LOCATION DETAILED DESCRIPTION DERM
LOCATION DETAILED: LEFT ANTERIOR AXILLA
LOCATION DETAILED: GLABELLA
LOCATION DETAILED: LEFT ULNAR DORSAL HAND
LOCATION DETAILED: RIGHT SUPERIOR MEDIAL UPPER BACK
LOCATION DETAILED: RIGHT POSTERIOR SHOULDER
LOCATION DETAILED: LEFT POSTERIOR SHOULDER
LOCATION DETAILED: RIGHT AXILLARY VAULT
LOCATION DETAILED: LEFT SUPERIOR LATERAL UPPER BACK
LOCATION DETAILED: STERNAL NOTCH
LOCATION DETAILED: LEFT NASAL ROOT
LOCATION DETAILED: RIGHT RADIAL DORSAL HAND
LOCATION DETAILED: LEFT RIB CAGE
LOCATION DETAILED: LEFT DISTAL DORSAL FOREARM

## 2021-05-24 ASSESSMENT — LOCATION SIMPLE DESCRIPTION DERM
LOCATION SIMPLE: RIGHT UPPER BACK
LOCATION SIMPLE: LEFT ANTERIOR AXILLA
LOCATION SIMPLE: RIGHT HAND
LOCATION SIMPLE: ABDOMEN
LOCATION SIMPLE: LEFT FOREARM
LOCATION SIMPLE: GLABELLA
LOCATION SIMPLE: LEFT SHOULDER
LOCATION SIMPLE: LEFT UPPER BACK
LOCATION SIMPLE: NOSE
LOCATION SIMPLE: RIGHT AXILLARY VAULT
LOCATION SIMPLE: RIGHT SHOULDER
LOCATION SIMPLE: CHEST
LOCATION SIMPLE: LEFT HAND

## 2021-05-24 NOTE — PROCEDURE: SKIN TAG REMOVAL
Anesthesia Volume In Cc: 0.1
Detail Level: Detailed
Medical Necessity Clause: This procedure was medically necessary because the lesions that were treated were:
Medical Necessity Information: It is in your best interest to select a reason for this procedure from the list below. All of these items fulfill various CMS LCD requirements except the new and changing color options.
Add Associated Diagnoses If Applicable When Selecting Medical Necessity: Yes
Include Z78.9 (Other Specified Conditions Influencing Health Status) As An Associated Diagnosis?: No
Consent: - Verbal and written consent was obtained, and risks were reviewed prior to procedure today. \\n- Risks discussed include but are not limited to bleeding, pigmentary change, infection, pain, and remote possibility of scarring. \\n- The patient understands that the procedure is cosmetic in nature and is not covered by or billable to insurance.
Anesthesia Type: 2% lidocaine with epinephrine

## 2021-05-24 NOTE — PROCEDURE: COUNSELING
Detail Level: Detailed
Patient Specific Counseling (Will Not Stick From Patient To Patient): - Advised that skin tags are benign growths. \\n- The most common methods for treatment are snip removal and cryosurgery.\\n- More will likely develop over time.\\n- Patient requested treatment today due to symptoms (See HPI).\\n- Recommended treatment with snip removal.\\n- Patient expressed understanding.
Detail Level: Simple
Patient Specific Counseling (Will Not Stick From Patient To Patient): - Discussed that this nevus has atypical features that warrant a biopsy.\\n- Patient expressed understanding.\\n- Follow-up for re-examination for regimentation or an additional removal procedure may become necessary depending the pathologist's report.
Detail Level: Zone

## 2021-05-24 NOTE — PROCEDURE: PATHOLOGY BILLING
Immunohistochemistry ([id number] and [id number]) billing is not performed here. Please use the Immunohistochemistry Stain Billing plan to accomplish this. Immunohistochemistry ([id number] and [id number]) billing is not performed here. Please use the Immunohistochemistry Stain Billing plan to accomplish this.

## 2021-05-24 NOTE — PROCEDURE: BIOPSY BY SHAVE METHOD
Consent: - Verbal and written consent was obtained and risks were reviewed prior to procedure today. \\n- Risks discussed include but are not limited to scarring, infection, bleeding, scabbing, incomplete removal, nerve damage, pain, and allergy to anesthesia.
Additional Anesthesia Volume In Cc (Will Not Render If 0): 0
Hide Additional Size Dimension?: Yes
Validate Anticipated Plan: No
Anesthesia Type: 2% lidocaine with epinephrine
Anesthesia Volume In Cc (Will Not Render If 0): 0.5
Depth Of Biopsy: dermis
Curettage Text: The wound bed was treated with curettage after the biopsy was performed.
Post-Care Instructions: WOUND CARE:\\nDo NOT submerge wound in a bath, swimming pool, or hot tub for at least 1 week or for as long as there is an open wound. Gently cleanse the site daily with mild soap and water. Be careful NOT to allow a forceful stream of water to hit the biopsy site. After cleaning/showering, apply a thin layer of petrolatum ointment or Aquaphor in the wound followed by an adhesive bandage. Continue this process daily until healed. \\n\\nBLEEDING:\\nIf you develop persistent bleeding, apply firm and steady pressure over the dressing with gauze for 15 minutes. If bleeding persists, reapply pressure with an ice pack over the gauze for 15 minutes. NEVER APPLY ICE DIRECTLY TO THE SKIN. Do NOT peek under the gauze during these 15 minutes of pressure.  Call our office at [phone] if you cannot get the bleeding to stop. \\n\\nINFECTION:\\nSigns of infection may include increasing rather than decreasing pain (after the first few days), increasing redness/swelling/heat, draining pus, pink/red streaks around the wound, and fever or chills.  Please call our office immediately at [phone] if infection is suspected. It is normal to have some mild redness on or around the wound edges; this will lighten day by day and will become less tender.\\n\\nPAIN:\\nPain is usually minimal, but if needed you may take acetaminophen (Tylenol) every four hours as needed. Applying an ice pack over the dressing for 15-20 minutes every 2-3 hours will relieve swelling, lessen pain, and help minimize bruising. NEVER APPLY ICE DIRECTLY TO THE SKIN. Avoid ibuprofen (Advil, Motrin) and naproxen (Aleve) for the first 48 hours as these can increase bleeding.\\n\\nSWELLIG AND BRUISING:\\nSwelling and bruising are common and temporary, usually lasting 1 - 2 weeks. It is more common in areas treated around the eyes, hands, and feet. In the days following the procedure, swelling and bruising can be minimized by keeping the affected areas elevated when possible, reducing salty foods, and applying ice packs over the dressing for 15-20 minutes every 2-3 hours. NEVER APPLY ICE DIRECTLY TO THE SKIN.\\n\\nITCHING:\\nItchiness on and around the wound is very common and can last several days to weeks after surgery. Mild itch is normal as the wound is healing. \\n\\nNERVE CHANGES:\\nNumbness is usually temporary, but it may last for several weeks to months. You may also experience sharp pains at the wound sight as it heals.  Mild pain is normal and will gradually improve with time.\\n \\nNO SMOKING:\\nSmoking will delay the healing process. If you smoke, we recommend trying to quit or at minimum reduce how much you smoke following a procedure.
Electrodesiccation Text: The wound bed was treated with electrodesiccation after the biopsy was performed.
Hemostasis: Aluminum Chloride
Path Notes Override (Will Replace All Of The Above Text): NROD
Wound Care: Petrolatum
Electrodesiccation And Curettage Text: The wound bed was treated with electrodesiccation and curettage after the biopsy was performed.
Biopsy Type: H and E
Cryotherapy Text: The wound bed was treated with cryotherapy after the biopsy was performed.
Biopsy Method: Dermablade
Type Of Destruction Used: Electrodesiccation
Billing Type: Third-Party Bill
Notification Instructions: - It can take up to 2 weeks to get a biopsy result. \\n- Please refrain from calling to request results until after 2 weeks.
Detail Level: Detailed
Dressing: bandage
Information: Selecting Yes will display possible errors in your note based on the variables you have selected. This validation is only offered as a suggestion for you. PLEASE NOTE THAT THE VALIDATION TEXT WILL BE REMOVED WHEN YOU FINALIZE YOUR NOTE. IF YOU WANT TO FAX A PRELIMINARY NOTE YOU WILL NEED TO TOGGLE THIS TO 'NO' IF YOU DO NOT WANT IT IN YOUR FAXED NOTE.
Silver Nitrate Text: The wound bed was treated with silver nitrate after the biopsy was performed.

## 2021-05-25 NOTE — TELEPHONE ENCOUNTER
Continue to check 1 blood pressure every other day, randomly, for the next 1 month. Report back with those recorded readings - she can drop them off at the clinic or type them into a [employer] message.  Do not increase dose at this time

## 2021-05-26 NOTE — TELEPHONE ENCOUNTER
Notified patient of the orders below per PCP.  Patient stated understanding and agreeable with the plan of care.   Lulu GOINSN-RN.  [facility]

## 2021-05-26 NOTE — TELEPHONE ENCOUNTER
Called [phone] (home)       Did patient answer the phone: No, left a message on voicemail to return call to the [facility] [place] at [phone].      [facility] Nursing Team

## 2021-06-16 ENCOUNTER — ALLIED HEALTH/NURSE VISIT (OUTPATIENT)
Dept: FAMILY MEDICINE | Facility: CLINIC | Age: 76
End: 2021-06-16
Payer: COMMERCIAL

## 2021-06-16 ENCOUNTER — OFFICE VISIT (OUTPATIENT)
Dept: ORTHOPEDICS | Facility: CLINIC | Age: 76
End: 2021-06-16
Payer: COMMERCIAL

## 2021-06-16 VITALS
HEIGHT: 63 IN | DIASTOLIC BLOOD PRESSURE: 80 MMHG | SYSTOLIC BLOOD PRESSURE: 122 MMHG | BODY MASS INDEX: 35.08 KG/M2 | WEIGHT: 198 LBS

## 2021-06-16 VITALS — SYSTOLIC BLOOD PRESSURE: 114 MMHG | DIASTOLIC BLOOD PRESSURE: 80 MMHG

## 2021-06-16 DIAGNOSIS — G89.29 CHRONIC PAIN OF BOTH FEET: ICD-10-CM

## 2021-06-16 DIAGNOSIS — Z01.30 BP CHECK: Primary | ICD-10-CM

## 2021-06-16 DIAGNOSIS — M79.671 CHRONIC PAIN OF BOTH FEET: ICD-10-CM

## 2021-06-16 DIAGNOSIS — M79.672 CHRONIC PAIN OF BOTH FEET: ICD-10-CM

## 2021-06-16 DIAGNOSIS — M19.079 ARTHRITIS OF MIDFOOT: Primary | ICD-10-CM

## 2021-06-16 PROCEDURE — 99207 PR NO CHARGE NURSE ONLY: CPT | Performed by: PHYSICIAN ASSISTANT

## 2021-06-16 PROCEDURE — 99203 OFFICE O/P NEW LOW 30 MIN: CPT | Performed by: FAMILY MEDICINE

## 2021-06-16 ASSESSMENT — MIFFLIN-ST. JEOR: SCORE: 1352.25

## 2021-06-16 NOTE — PROGRESS NOTES
"Jenna Kaur  :  1945  DOS: 2021  MRN: [id number]    Sports Medicine Clinic Visit    PCP: Kenna Garcia    Jenna Kaur is a 75 year old female who is seen as a self referral presenting with chronic bilateral foot pain.    Injury: Gradual onset of chronic bilateral foot pain over the past 2 - 3 years.  Pain located over bilateral dorsal midfoot, nonradiating.  Additional Features:  Positive: swelling and painful weight bearing.  Symptoms are better with Rest.  Symptoms are worse with: prolonged standing, walking.  Other evaluation and/or treatments so far consists of: Rest and podiatry.  Prior imaging: X-rays completed 2019.  Prior History of related problems: none    Social History: retired - works part time for [employer]    Review of Systems  Musculoskeletal: as above  Remainder of review of systems is negative including constitutional, CV, pulmonary, GI, Skin and Neurologic except as noted in HPI or medical history.    No past medical history on file.  No past surgical history on file.  Family History   Problem Relation Age of Onset     Breast Cancer Mother 70     Colon Cancer Father 59     Arrhythmia Brother      Sleep Apnea Brother        Objective  /80   Ht 1.6 m (5' 3\")   Wt 89.8 kg (198 lb)   BMI 35.07 kg/m        General: healthy, alert and in no distress      HEENT: no scleral icterus or conjunctival erythema     Skin: no suspicious lesions or rash. No jaundice.     CV: regular rhythm by palpation, 2+ distal pulses, no pedal edema      Resp: normal respiratory effort without conversational dyspnea     Psych: normal mood and affect      Gait: nonantalgic, appropriate coordination and balance     Neuro: normal light touch sensory exam of the extremities. Motor strength as noted below     Bilateral Ankle/Foot Exam:    Inspection:       no visible ecchymosis       edema noted dorsal aspect of L>R midfoot, 2nd/3rd TMT joint       Normal DP artery pulse       Normal PT " artery pulse       Mild bunions L>R, functional pes planus L>R    Foot inspection:       no deformity noted    ROM:        full ROM with dorsiflexion, plantarflexion, inversion and eversion    Tender:       L>R 2nd > 3rd TMT joints    Non-Tender:       remainder of foot and ankle       lateral malleolus       lateral ankle ligaments       deltoid ligament       posterior edge of lateral malleolus       proximal 5th metatarsal       dorsal tibiotalar joint       tarsal navicular       medial malleolus       distal tibiofibular joint       tibialis posterior tendon, posterior to medial malleolus       peroneal tendon sheath    Skin:       well perfused       capillary refill less than 2 seconds    Special Tests:       Painful axial load over the 2nd TMT joint L>R    Gait:       normal    Proprioception:        deferred    Radiology:  FOOT BILATERAL THREE OR MORE VIEWS  8/29/2019 10:56 AM      HISTORY: Foot pain, bilateral.                                                            IMPRESSION: Mild bilateral hallux valgus and bunion. No  metatarsophalangeal joint space narrowing. Small-to-moderate bilateral  plantar calcaneal spurs.    Assessment:  1. Arthritis of midfoot    2. Chronic pain of both feet        Plan:  Discussed the assessment with the patient.  Follow up: prn  XR images independently visualized and reviewed with patient today in clinic  2nd TMT joint predominate OA on exam and XR, reviewed in detail  Reviewed likely relationship of DJD with overall foot mechanics, reviewed prior hx and XR findings of PF and bunions as well, though  neither are symptomatic at this time  Reviewed footwear options and strategies  Reviewed modified activity and exercise options, use of assistive devices when needed  Reviewed option for US guided CSI to likely 2nd TMT joint for diagnostic/therapeutic goals, defer for now  Reviewed option for use of topical Voltaren gel up to QID if helpful, safer NSAID alternative  Home  handouts provided and supportive care reviewed  All questions were answered today  Contact us with additional questions or concerns  Signs and sx of concern reviewed      Lucius Vega DO, TISH  Sports Medicine Physician  [facility]

## 2021-06-16 NOTE — LETTER
"    2021         RE: Jenna Kaur  [address]  [place] [address]        Dear Colleague,    Thank you for referring your patient, Jenna Kaur, to the [facility] [place]. Please see a copy of my visit note below.    Jenna Kaur  :  1945  DOS: 2021  MRN: [id number]    Sports Medicine Clinic Visit    PCP: Kenna Garcia    Jenna Kaur is a 75 year old female who is seen as a self referral presenting with chronic bilateral foot pain.    Injury: Gradual onset of chronic bilateral foot pain over the past 2 - 3 years.  Pain located over bilateral dorsal midfoot, nonradiating.  Additional Features:  Positive: swelling and painful weight bearing.  Symptoms are better with Rest.  Symptoms are worse with: prolonged standing, walking.  Other evaluation and/or treatments so far consists of: Rest and podiatry.  Prior imaging: X-rays completed .  Prior History of related problems: none    Social History: retired - works part time for [employer]    Review of Systems  Musculoskeletal: as above  Remainder of review of systems is negative including constitutional, CV, pulmonary, GI, Skin and Neurologic except as noted in HPI or medical history.    No past medical history on file.  No past surgical history on file.  Family History   Problem Relation Age of Onset     Breast Cancer Mother 70     Colon Cancer Father 59     Arrhythmia Brother      Sleep Apnea Brother        Objective  /80   Ht 1.6 m (5' 3\")   Wt 89.8 kg (198 lb)   BMI 35.07 kg/m        General: healthy, alert and in no distress      HEENT: no scleral icterus or conjunctival erythema     Skin: no suspicious lesions or rash. No jaundice.     CV: regular rhythm by palpation, 2+ distal pulses, no pedal edema      Resp: normal respiratory effort without conversational dyspnea     Psych: normal mood and affect      Gait: nonantalgic, appropriate coordination and balance "     Neuro: normal light touch sensory exam of the extremities. Motor strength as noted below     Bilateral Ankle/Foot Exam:    Inspection:       no visible ecchymosis       edema noted dorsal aspect of L>R midfoot, 2nd/3rd TMT joint       Normal DP artery pulse       Normal PT artery pulse       Mild bunions L>R, functional pes planus L>R    Foot inspection:       no deformity noted    ROM:        full ROM with dorsiflexion, plantarflexion, inversion and eversion    Tender:       L>R 2nd > 3rd TMT joints    Non-Tender:       remainder of foot and ankle       lateral malleolus       lateral ankle ligaments       deltoid ligament       posterior edge of lateral malleolus       proximal 5th metatarsal       dorsal tibiotalar joint       tarsal navicular       medial malleolus       distal tibiofibular joint       tibialis posterior tendon, posterior to medial malleolus       peroneal tendon sheath    Skin:       well perfused       capillary refill less than 2 seconds    Special Tests:       Painful axial load over the 2nd TMT joint L>R    Gait:       normal    Proprioception:        deferred    Radiology:  FOOT BILATERAL THREE OR MORE VIEWS  8/29/2019 10:56 AM      HISTORY: Foot pain, bilateral.                                                            IMPRESSION: Mild bilateral hallux valgus and bunion. No  metatarsophalangeal joint space narrowing. Small-to-moderate bilateral  plantar calcaneal spurs.    Assessment:  1. Arthritis of midfoot    2. Chronic pain of both feet        Plan:  Discussed the assessment with the patient.  Follow up: prn  XR images independently visualized and reviewed with patient today in clinic  2nd TMT joint predominate OA on exam and XR, reviewed in detail  Reviewed likely relationship of DJD with overall foot mechanics, reviewed prior hx and XR findings of PF and bunions as well, though  neither are symptomatic at this time  Reviewed footwear options and strategies  Reviewed modified  activity and exercise options, use of assistive devices when needed  Reviewed option for US guided CSI to likely 2nd TMT joint for diagnostic/therapeutic goals, defer for now  Reviewed option for use of topical Voltaren gel up to QID if helpful, safer NSAID alternative  Home handouts provided and supportive care reviewed  All questions were answered today  Contact us with additional questions or concerns  Signs and sx of concern reviewed      Lucius Vega DO, TISH  Sports Medicine Physician  [facility]                Again, thank you for allowing me to participate in the care of your patient.        Sincerely,        Lucius Vega DO

## 2021-06-16 NOTE — PROGRESS NOTES
Jenna Kaur was evaluated at [facility] on June 16, 2021 at which time her blood pressure was:    BP Readings from Last 3 Encounters:   06/16/21 114/80   06/16/21 122/80   05/17/21 (!) 156/86     Pulse Readings from Last 3 Encounters:   02/10/21 68   01/07/21 62   12/11/20 70       Reviewed lifestyle modifications for blood pressure control and reduction: including making healthy food choices, managing weight, getting regular exercise, smoking cessation, reducing alcohol consumption, monitoring blood pressure regularly.     Symptoms: None    BP Goal:< 140/90 mmHg    BP Assessment:  BP at goal    Potential Reasons for BP too high: NA - Not applicable    BP Follow-Up Plan: Recheck BP in 30 days at pharmacy    Recommendation to Provider: Patient had some high readings in May, so I think we should recheck in 30 days to make sure blood pressure still at goal.  If her blood pressure readings are good in July, I think we can recommend her to come back in 6 months after that, as all her home readings are within goal (she brought in a log from 5.18.21 to 6.15.21 where she checked every other day, and there was not a single reading above goal).    Note completed by: Raven Smith, Pharm.D

## 2021-07-08 ENCOUNTER — MYC MEDICAL ADVICE (OUTPATIENT)
Dept: FAMILY MEDICINE | Facility: CLINIC | Age: 76
End: 2021-07-08

## 2021-07-08 DIAGNOSIS — Z12.31 ENCOUNTER FOR SCREENING MAMMOGRAM FOR BREAST CANCER: Primary | ICD-10-CM

## 2021-08-09 ENCOUNTER — ANCILLARY PROCEDURE (OUTPATIENT)
Dept: MAMMOGRAPHY | Facility: CLINIC | Age: 76
End: 2021-08-09
Payer: COMMERCIAL

## 2021-08-09 DIAGNOSIS — Z12.31 ENCOUNTER FOR SCREENING MAMMOGRAM FOR BREAST CANCER: ICD-10-CM

## 2021-08-09 PROCEDURE — 77067 SCR MAMMO BI INCL CAD: CPT | Mod: TC | Performed by: RADIOLOGY

## 2021-10-18 ENCOUNTER — OFFICE VISIT (OUTPATIENT)
Dept: FAMILY MEDICINE | Facility: CLINIC | Age: 76
End: 2021-10-18
Payer: COMMERCIAL

## 2021-10-18 VITALS
RESPIRATION RATE: 16 BRPM | SYSTOLIC BLOOD PRESSURE: 168 MMHG | DIASTOLIC BLOOD PRESSURE: 82 MMHG | BODY MASS INDEX: 34.47 KG/M2 | HEART RATE: 64 BPM | WEIGHT: 194.6 LBS | TEMPERATURE: 96.9 F

## 2021-10-18 DIAGNOSIS — I10 ESSENTIAL HYPERTENSION WITH GOAL BLOOD PRESSURE LESS THAN 140/90: ICD-10-CM

## 2021-10-18 DIAGNOSIS — Z01.818 PREOP GENERAL PHYSICAL EXAM: Primary | ICD-10-CM

## 2021-10-18 DIAGNOSIS — H25.9 AGE-RELATED CATARACT OF BOTH EYES, UNSPECIFIED AGE-RELATED CATARACT TYPE: ICD-10-CM

## 2021-10-18 PROCEDURE — 90662 IIV NO PRSV INCREASED AG IM: CPT | Performed by: PHYSICIAN ASSISTANT

## 2021-10-18 PROCEDURE — 99214 OFFICE O/P EST MOD 30 MIN: CPT | Mod: 25 | Performed by: PHYSICIAN ASSISTANT

## 2021-10-18 PROCEDURE — G0008 ADMIN INFLUENZA VIRUS VAC: HCPCS | Performed by: PHYSICIAN ASSISTANT

## 2021-10-18 ASSESSMENT — PAIN SCALES - GENERAL: PAINLEVEL: NO PAIN (0)

## 2021-10-18 NOTE — PROGRESS NOTES
[facility]  [address]  [place] [address]  Phone: [phone]  Primary Provider: Kenna Wright  Pre-op Performing Provider: KENNA WRIGHT      PREOPERATIVE EVALUATION:  Today's date: 10/18/2021    Jenna Kaur is a 75 year old female who presents for a preoperative evaluation.    Surgical Information:  Surgery/Procedure: Cataract Surgery  Surgery Location: [facility]- [facility]  Surgeon: Dr. Riedel  Surgery Date: Left eye 11/2/21, Right eye 11/16/21  Time of Surgery: TBD  Where patient plans to recover: At home with family  Fax number for surgical facility: [phone]    Type of Anesthesia Anticipated: unsure    Assessment & Plan     The proposed surgical procedure is considered LOW risk.    1. Preop general physical exam    2. Age-related cataract of both eyes, unspecified age-related cataract type    3. Essential hypertension with goal blood pressure less than 140/90        Blood pressure patient her home readings are much better. She has brought in her home device and it reads similarly.       Risks and Recommendations:  The patient has the following additional risks and recommendations for perioperative complications:   - No identified additional risk factors other than previously addressed    Medication Instructions:  Patient is to take all scheduled medications on the day of surgery    RECOMMENDATION:  APPROVAL GIVEN to proceed with proposed procedure, without further diagnostic evaluation.            Subjective     HPI related to upcoming procedure: Cataracts, bilat    Preop Questions 10/13/2021   1. Have you ever had a heart attack or stroke? No   2. Have you ever had surgery on your heart or blood vessels, such as a stent placement, a coronary artery bypass, or surgery on an artery in your head, neck, heart, or legs? No   3. Do you have chest pain with activity? No   4. Do you have a history of  heart failure? No   5. Do you currently have  a cold, bronchitis or symptoms of other infection? No   6. Do you have a cough, shortness of breath, or wheezing? No   7. Do you or anyone in your family have previous history of blood clots? No   8. Do you or does anyone in your family have a serious bleeding problem such as prolonged bleeding following surgeries or cuts? No   9. Have you ever had problems with anemia or been told to take iron pills? No   10. Have you had any abnormal blood loss such as black, tarry or bloody stools, or abnormal vaginal bleeding? No   11. Have you ever had a blood transfusion? No   12. Are you willing to have a blood transfusion if it is medically needed before, during, or after your surgery? Yes   13. Have you or any of your relatives ever had problems with anesthesia? No   14. Do you have sleep apnea, excessive snoring or daytime drowsiness? No   15. Do you have any artifical heart valves or other implanted medical devices like a pacemaker, defibrillator, or continuous glucose monitor? No   16. Do you have artificial joints? No   17. Are you allergic to latex? No       Health Care Directive:  Patient has a Health Care Directive on file      Preoperative Review of :  Not reviewed, no prescriptions on medication list      Status of Chronic Conditions:  See problem list for active medical problems.  Problems all longstanding and stable, except as noted/documented.  See ROS for pertinent symptoms related to these conditions.      Review of Systems  Constitutional, neuro, ENT, endocrine, pulmonary, cardiac, gastrointestinal, genitourinary, musculoskeletal, integument and psychiatric systems are negative, except as otherwise noted.    Patient Active Problem List    Diagnosis Date Noted     Decreased GFR 12/10/2019     Priority: Medium     Essential hypertension with goal blood pressure less than 140/90 12/03/2018     Priority: Medium     Osteopenia of both hips 12/03/2018     Priority: Medium     Nonrheumatic aortic valve stenosis  12/03/2018     Priority: Medium     Hypothyroidism due to acquired atrophy of thyroid 11/01/2018     Priority: Medium     Hyperlipidemia LDL goal <130 11/01/2018     Priority: Medium      History reviewed. No pertinent past medical history.  History reviewed. No pertinent surgical history.  Current Outpatient Medications   Medication Sig Dispense Refill     aspirin 81 MG EC tablet Take 81 mg by mouth daily       Calcium Carbonate-Vitamin D (CALCIUM 500 + D PO) Take  by mouth daily. Takes 2 tablets       carvedilol (COREG) 25 MG tablet TAKE ONE TABLET BY MOUTH TWICE A DAY WITH MEALS 180 tablet 1     levothyroxine (SYNTHROID/LEVOTHROID) 112 MCG tablet Take 1 tablet (112 mcg) by mouth daily 90 tablet 3     losartan (COZAAR) 50 MG tablet Take 1 tablet (50 mg) by mouth daily 90 tablet 2     polyethylene glycol (MIRALAX/GLYCOLAX) powder as needed       simvastatin (ZOCOR) 10 MG tablet Take 1 tablet (10 mg) by mouth At Bedtime 90 tablet 3       Allergies   Allergen Reactions     Erythromycin GI Disturbance     Nausea and sick feeling        Social History     Tobacco Use     Smoking status: Never Smoker     Smokeless tobacco: Never Used   Substance Use Topics     Alcohol use: Never     Family History   Problem Relation Age of Onset     Breast Cancer Mother 70     Colon Cancer Father 59     Arrhythmia Brother      Sleep Apnea Brother      History   Drug Use Unknown         Objective     BP (!) 168/82 (BP Location: Left arm, Patient Position: Sitting, Cuff Size: Adult Large)   Pulse 64   Temp 96.9  F (36.1  C) (Tympanic)   Resp 16   Wt 88.3 kg (194 lb 9.6 oz)   BMI 34.47 kg/m      Physical Exam    GENERAL APPEARANCE: healthy, alert and no distress     EYES: EOMI, PERRL     HENT: ear canals and TM's normal and nose and mouth without ulcers or lesions     NECK: no adenopathy, no asymmetry, masses, or scars and thyroid normal to palpation     RESP: lungs clear to auscultation - no rales, rhonchi or wheezes     CV: regular  rates and rhythm, normal S1 S2, no S3 or S4 and no murmur, click or rub     ABDOMEN:  soft, nontender, no HSM or masses and bowel sounds normal     MS: extremities normal- no gross deformities noted, no evidence of inflammation in joints, FROM in all extremities.     SKIN: no suspicious lesions or rashes     NEURO: Normal strength and tone, sensory exam grossly normal, mentation intact and speech normal     PSYCH: mentation appears normal. and affect normal/bright     LYMPHATICS: No cervical adenopathy    Recent Labs   Lab Test 02/10/21  0729 12/11/20  0838     --    POTASSIUM 4.5  --    CR 1.00 0.81        Diagnostics:  No labs were ordered during this visit.   No EKG required for low risk surgery (cataract, skin procedure, breast biopsy, etc).    Revised Cardiac Risk Index (RCRI):  The patient has the following serious cardiovascular risks for perioperative complications:   - No serious cardiac risks = 0 points     RCRI Interpretation: 0 points: Class I (very low risk - 0.4% complication rate)       Signed Electronically by: Kenna Garcia PA-C  Copy of this evaluation report is provided to requesting physician.

## 2021-10-18 NOTE — PATIENT INSTRUCTIONS

## 2021-10-28 DIAGNOSIS — I10 ESSENTIAL HYPERTENSION WITH GOAL BLOOD PRESSURE LESS THAN 140/90: ICD-10-CM

## 2021-11-01 RX ORDER — CARVEDILOL 25 MG/1
TABLET ORAL
Qty: 180 TABLET | Refills: 1 | Status: SHIPPED | OUTPATIENT
Start: 2021-11-01 | End: 2022-04-22

## 2022-01-01 DIAGNOSIS — I10 ESSENTIAL HYPERTENSION WITH GOAL BLOOD PRESSURE LESS THAN 140/90: ICD-10-CM

## 2022-01-02 NOTE — TELEPHONE ENCOUNTER
Routing refill request to provider for review/approval because:  Drug not on the [facility] refill protocol   BP Readings from Last 3 Encounters:   10/18/21 (!) 168/82   06/16/21 114/80   06/16/21 122/80     Potassium   Date Value Ref Range Status   02/10/2021 4.5 3.4 - 5.3 mmol/L Final

## 2022-01-03 RX ORDER — LOSARTAN POTASSIUM 50 MG/1
TABLET ORAL
Qty: 30 TABLET | Refills: 0 | Status: SHIPPED | OUTPATIENT
Start: 2022-01-03 | End: 2022-01-28

## 2022-01-24 ASSESSMENT — ENCOUNTER SYMPTOMS
DIARRHEA: 0
JOINT SWELLING: 0
HEMATURIA: 0
HEMATOCHEZIA: 0
SORE THROAT: 0
DIZZINESS: 0
SHORTNESS OF BREATH: 0
COUGH: 0
FEVER: 0
PALPITATIONS: 0
HEADACHES: 0
DYSURIA: 0
ABDOMINAL PAIN: 0
CHILLS: 0
MYALGIAS: 0
ARTHRALGIAS: 0
WEAKNESS: 0
CONSTIPATION: 0
FREQUENCY: 0
BREAST MASS: 0
HEARTBURN: 0
EYE PAIN: 0
NERVOUS/ANXIOUS: 0
NAUSEA: 0
PARESTHESIAS: 0

## 2022-01-24 ASSESSMENT — ACTIVITIES OF DAILY LIVING (ADL): CURRENT_FUNCTION: NO ASSISTANCE NEEDED

## 2022-01-26 DIAGNOSIS — I10 ESSENTIAL HYPERTENSION WITH GOAL BLOOD PRESSURE LESS THAN 140/90: ICD-10-CM

## 2022-01-26 DIAGNOSIS — E03.4 HYPOTHYROIDISM DUE TO ACQUIRED ATROPHY OF THYROID: ICD-10-CM

## 2022-01-26 DIAGNOSIS — E78.5 HYPERLIPIDEMIA LDL GOAL <130: ICD-10-CM

## 2022-01-28 RX ORDER — SIMVASTATIN 10 MG
TABLET ORAL
Qty: 30 TABLET | Refills: 0 | Status: SHIPPED | OUTPATIENT
Start: 2022-01-28 | End: 2022-01-31

## 2022-01-28 RX ORDER — LOSARTAN POTASSIUM 50 MG/1
TABLET ORAL
Qty: 30 TABLET | Refills: 0 | Status: SHIPPED | OUTPATIENT
Start: 2022-01-28 | End: 2022-01-31

## 2022-01-28 RX ORDER — LEVOTHYROXINE SODIUM 112 UG/1
TABLET ORAL
Qty: 30 TABLET | Refills: 0 | Status: SHIPPED | OUTPATIENT
Start: 2022-01-28 | End: 2022-01-31

## 2022-01-28 ASSESSMENT — ENCOUNTER SYMPTOMS
CHILLS: 0
HEADACHES: 0
HEARTBURN: 0
NERVOUS/ANXIOUS: 0
ARTHRALGIAS: 0
JOINT SWELLING: 0
FREQUENCY: 0
DIARRHEA: 0
CONSTIPATION: 0
NAUSEA: 0
SHORTNESS OF BREATH: 0
BREAST MASS: 0
WEAKNESS: 0
HEMATOCHEZIA: 0
ABDOMINAL PAIN: 0
MYALGIAS: 0
DYSURIA: 0
HEMATURIA: 0
PARESTHESIAS: 0
COUGH: 0
FEVER: 0
EYE PAIN: 0
PALPITATIONS: 0
SORE THROAT: 0
DIZZINESS: 0

## 2022-01-28 ASSESSMENT — ACTIVITIES OF DAILY LIVING (ADL): CURRENT_FUNCTION: NO ASSISTANCE NEEDED

## 2022-01-28 NOTE — TELEPHONE ENCOUNTER
She is coming in on 1/31/22 for annual visit.    Simin Baca RN  [facility]

## 2022-01-28 NOTE — PROGRESS NOTES
"SUBJECTIVE:   Jenna Kaur is a 76 year old female who presents for Preventive Visit.      Patient has been advised of split billing requirements and indicates understanding: Yes      Healthy Habits:     In general, how would you rate your overall health?  Good    Frequency of exercise:  None    Do you usually eat at least 4 servings of fruit and vegetables a day, include whole grains    & fiber and avoid regularly eating high fat or \"junk\" foods?  No    Taking medications regularly:  Yes    Medication side effects:  None    Ability to successfully perform activities of daily living:  No assistance needed    Home Safety:  Lack of grab bars in the bathroom    Hearing Impairment:  No hearing concerns    In the past 6 months, have you been bothered by leaking of urine?  No    In general, how would you rate your overall mental or emotional health?  Excellent      PHQ-2 Total Score: 0    Additional concerns today:  No    Do you feel safe in your environment? Yes    Have you ever done Advance Care Planning? (For example, a Health Directive, POLST, or a discussion with a medical provider or your loved ones about your wishes): Yes, advance care planning is on file.       Fall risk  Fallen 2 or more times in the past year?: No  Any fall with injury in the past year?: No    Cognitive Screening   1) Repeat 3 items (Leader, Season, Table)    2) Clock draw: NORMAL  3) 3 item recall: Recalls 3 objects  Results: 3 items recalled: COGNITIVE IMPAIRMENT LESS LIKELY    Mini-CogTM Copyright JUANITA Serna. Licensed by the author for use in [facility]; reprinted with permission (steph@.[facility]). All rights reserved.      Do you have sleep apnea, excessive snoring or daytime drowsiness?: no    Reviewed and updated as needed this visit by clinical staff  Tobacco  Allergies  Meds   Med Hx  Surg Hx   Soc Hx       Reviewed and updated as needed this visit by Provider               Social History     Tobacco Use     Smoking " status: Never Smoker     Smokeless tobacco: Never Used   Substance Use Topics     Alcohol use: Never         Alcohol Use 1/24/2022   Prescreen: >3 drinks/day or >7 drinks/week? No   Prescreen: >3 drinks/day or >7 drinks/week? -       PROBLEMS TO ADD ON...  Needing refills and/or labs for:  Hypertension  Is blood pressure being checked at home? No  Medication side effects? No    Hyperlipidemia  Following a low fat diet? No  Medication side effects? No    Hypothyroidism   Any hyper or hypo-thyroid symptoms? No    Additional medications:  none      Current providers sharing in care for this patient include:   Patient Care Team:  Kenna Garcia PA-C as PCP - General (Physician Assistant)  Kenna Garcia PA-C as Assigned PCP  Lucius Vega DO as Assigned Musculoskeletal Provider    The following health maintenance items are reviewed in Epic and correct as of today:  Health Maintenance Due   Topic Date Due     LIPID  12/11/2021     MICROALBUMIN  12/11/2021     TSH W/FREE T4 REFLEX  12/11/2021     BMP  02/10/2022     FALL RISK ASSESSMENT  02/10/2022     Lab work is in process    Mammogram Screening - Patient over age 75, has elected to continue with screening.  Pertinent mammograms are reviewed under the imaging tab.    Review of Systems   Constitutional: Negative for chills and fever.   HENT: Negative for congestion, ear pain, hearing loss and sore throat.    Eyes: Positive for visual disturbance. Negative for pain.   Respiratory: Negative for cough and shortness of breath.    Cardiovascular: Negative for chest pain, palpitations and peripheral edema.   Gastrointestinal: Negative for abdominal pain, constipation, diarrhea, heartburn, hematochezia and nausea.   Breasts:  Negative for tenderness, breast mass and discharge.   Genitourinary: Negative for dysuria, frequency, genital sores, hematuria, pelvic pain, urgency, vaginal bleeding and vaginal discharge.   Musculoskeletal: Negative for  "arthralgias, joint swelling and myalgias.   Skin: Negative for rash.   Neurological: Negative for dizziness, weakness, headaches and paresthesias.   Psychiatric/Behavioral: Negative for mood changes. The patient is not nervous/anxious.        OBJECTIVE:   BP (!) 166/88   Pulse 59   Temp 98.2  F (36.8  C) (Tympanic)   Resp 18   Ht 1.626 m (5' 4\")   Wt 89.1 kg (196 lb 8 oz)   SpO2 99%   BMI 33.73 kg/m   Estimated body mass index is 33.73 kg/m  as calculated from the following:    Height as of this encounter: 1.626 m (5' 4\").    Weight as of this encounter: 89.1 kg (196 lb 8 oz).  Physical Exam  GENERAL: healthy, alert and no distress  EYES: Eyes grossly normal to inspection, PERRL and conjunctivae and sclerae normal  HENT: ear canals and TM's normal, nose and mouth without ulcers or lesions  NECK: no adenopathy, no asymmetry, masses, or scars and thyroid normal to palpation  RESP: lungs clear to auscultation - no rales, rhonchi or wheezes  BREAST: normal without masses, tenderness or nipple discharge and no palpable axillary masses or adenopathy  CV: regular rates and rhythm, normal S1 S2, no S3 or S4 and grade 3/6 systolic murmur heard best over the right sternal border  ABDOMEN: soft, nontender, no hepatosplenomegaly, no masses and bowel sounds normal  MS: no gross musculoskeletal defects noted, no edema  SKIN: no suspicious lesions or rashes  NEURO: Normal strength and tone, mentation intact and speech normal  PSYCH: mentation appears normal, affect normal/bright      ASSESSMENT / PLAN:       ICD-10-CM    1. Encounter for Medicare annual wellness exam  Z00.00    2. Hyperlipidemia LDL goal <130  E78.5 Lipid panel reflex to direct LDL Non-fasting     simvastatin (ZOCOR) 10 MG tablet     Lipid panel reflex to direct LDL Non-fasting     CANCELED: Lipid panel reflex to direct LDL Fasting   3. Hypothyroidism due to acquired atrophy of thyroid  E03.4 TSH WITH FREE T4 REFLEX     levothyroxine (SYNTHROID/LEVOTHROID) " "112 MCG tablet     TSH WITH FREE T4 REFLEX   4. Essential hypertension with goal blood pressure less than 140/90  I10 Albumin Random Urine Quantitative with Creat Ratio     Basic metabolic panel  (Ca, Cl, CO2, Creat, Gluc, K, Na, BUN)     losartan (COZAAR) 50 MG tablet     Basic metabolic panel  (Ca, Cl, CO2, Creat, Gluc, K, Na, BUN)     Albumin Random Urine Quantitative with Creat Ratio   5. Nonrheumatic aortic valve stenosis  I35.0 Echocardiogram Complete   6. Elevated glucose  R73.09 Hemoglobin A1c     Hemoglobin A1c       2-4) Routine labs in process. Blood pressure readings have been normal at home. Will continue to monitor. No med changes today.    5) Due for repeat echo.    6) Will check an A1c for further evaluation      COUNSELING:  Reviewed preventive health counseling, as reflected in patient instructions    Estimated body mass index is 33.73 kg/m  as calculated from the following:    Height as of this encounter: 1.626 m (5' 4\").    Weight as of this encounter: 89.1 kg (196 lb 8 oz).    She reports that she has never smoked. She has never used smokeless tobacco.      Appropriate preventive services were discussed with this patient, including applicable screening as appropriate for cardiovascular disease, diabetes, osteopenia/osteoporosis, and glaucoma.  As appropriate for age/gender, discussed screening for colorectal cancer, prostate cancer, breast cancer, and cervical cancer. Checklist reviewing preventive services available has been given to the patient.    Reviewed patients plan of care and provided an AVS. The Basic Care Plan (routine screening as documented in Health Maintenance) for Jenna meets the Care Plan requirement. This Care Plan has been established and reviewed with the Patient.    Counseling Resources:  ATP IV Guidelines  Pooled Cohorts Equation Calculator  Breast Cancer Risk Calculator  Breast Cancer: Medication to Reduce Risk  FRAX Risk Assessment  ICSI Preventive Guidelines  Dietary " Guidelines for Americans, 2010  USDA's MyPlate  ASA Prophylaxis  Lung CA Screening    [facility]    Identified Health Risks:

## 2022-01-31 ENCOUNTER — OFFICE VISIT (OUTPATIENT)
Dept: FAMILY MEDICINE | Facility: CLINIC | Age: 77
End: 2022-01-31
Payer: COMMERCIAL

## 2022-01-31 VITALS
SYSTOLIC BLOOD PRESSURE: 166 MMHG | HEART RATE: 59 BPM | DIASTOLIC BLOOD PRESSURE: 88 MMHG | RESPIRATION RATE: 18 BRPM | BODY MASS INDEX: 33.55 KG/M2 | HEIGHT: 64 IN | WEIGHT: 196.5 LBS | TEMPERATURE: 98.2 F | OXYGEN SATURATION: 99 %

## 2022-01-31 DIAGNOSIS — E03.4 HYPOTHYROIDISM DUE TO ACQUIRED ATROPHY OF THYROID: ICD-10-CM

## 2022-01-31 DIAGNOSIS — I10 ESSENTIAL HYPERTENSION WITH GOAL BLOOD PRESSURE LESS THAN 140/90: ICD-10-CM

## 2022-01-31 DIAGNOSIS — E78.5 HYPERLIPIDEMIA LDL GOAL <130: ICD-10-CM

## 2022-01-31 DIAGNOSIS — R73.09 ELEVATED GLUCOSE: ICD-10-CM

## 2022-01-31 DIAGNOSIS — I35.0 NONRHEUMATIC AORTIC VALVE STENOSIS: ICD-10-CM

## 2022-01-31 DIAGNOSIS — Z00.00 ENCOUNTER FOR MEDICARE ANNUAL WELLNESS EXAM: Primary | ICD-10-CM

## 2022-01-31 LAB
ANION GAP SERPL CALCULATED.3IONS-SCNC: 7 MMOL/L (ref 3–14)
BUN SERPL-MCNC: 18 MG/DL (ref 7–30)
CALCIUM SERPL-MCNC: 10 MG/DL (ref 8.5–10.1)
CHLORIDE BLD-SCNC: 105 MMOL/L (ref 94–109)
CHOLEST SERPL-MCNC: 156 MG/DL
CO2 SERPL-SCNC: 26 MMOL/L (ref 20–32)
CREAT SERPL-MCNC: 0.82 MG/DL (ref 0.52–1.04)
CREAT UR-MCNC: 73 MG/DL
FASTING STATUS PATIENT QL REPORTED: YES
GFR SERPL CREATININE-BSD FRML MDRD: 73 ML/MIN/1.73M2
GLUCOSE BLD-MCNC: 102 MG/DL (ref 70–99)
HBA1C MFR BLD: 5.2 % (ref 0–5.6)
HDLC SERPL-MCNC: 64 MG/DL
LDLC SERPL CALC-MCNC: 72 MG/DL
MICROALBUMIN UR-MCNC: 7 MG/L
MICROALBUMIN/CREAT UR: 9.59 MG/G CR (ref 0–25)
NONHDLC SERPL-MCNC: 92 MG/DL
POTASSIUM BLD-SCNC: 4.3 MMOL/L (ref 3.4–5.3)
SODIUM SERPL-SCNC: 138 MMOL/L (ref 133–144)
TRIGL SERPL-MCNC: 101 MG/DL
TSH SERPL DL<=0.005 MIU/L-ACNC: 0.47 MU/L (ref 0.4–4)

## 2022-01-31 PROCEDURE — 36415 COLL VENOUS BLD VENIPUNCTURE: CPT | Performed by: PHYSICIAN ASSISTANT

## 2022-01-31 PROCEDURE — 82043 UR ALBUMIN QUANTITATIVE: CPT | Performed by: PHYSICIAN ASSISTANT

## 2022-01-31 PROCEDURE — 83036 HEMOGLOBIN GLYCOSYLATED A1C: CPT | Performed by: PHYSICIAN ASSISTANT

## 2022-01-31 PROCEDURE — G0438 PPPS, INITIAL VISIT: HCPCS | Performed by: PHYSICIAN ASSISTANT

## 2022-01-31 PROCEDURE — 90715 TDAP VACCINE 7 YRS/> IM: CPT | Performed by: PHYSICIAN ASSISTANT

## 2022-01-31 PROCEDURE — 80048 BASIC METABOLIC PNL TOTAL CA: CPT | Performed by: PHYSICIAN ASSISTANT

## 2022-01-31 PROCEDURE — 84443 ASSAY THYROID STIM HORMONE: CPT | Performed by: PHYSICIAN ASSISTANT

## 2022-01-31 PROCEDURE — 99214 OFFICE O/P EST MOD 30 MIN: CPT | Mod: 25 | Performed by: PHYSICIAN ASSISTANT

## 2022-01-31 PROCEDURE — 80061 LIPID PANEL: CPT | Performed by: PHYSICIAN ASSISTANT

## 2022-01-31 PROCEDURE — 90471 IMMUNIZATION ADMIN: CPT | Performed by: PHYSICIAN ASSISTANT

## 2022-01-31 RX ORDER — SIMVASTATIN 10 MG
10 TABLET ORAL AT BEDTIME
Qty: 90 TABLET | Refills: 3 | Status: SHIPPED | OUTPATIENT
Start: 2022-01-31 | End: 2023-02-22

## 2022-01-31 RX ORDER — LOSARTAN POTASSIUM 50 MG/1
50 TABLET ORAL
COMMUNITY
Start: 2021-03-12 | End: 2022-01-31

## 2022-01-31 RX ORDER — LEVOTHYROXINE SODIUM 112 UG/1
112 TABLET ORAL DAILY
Qty: 90 TABLET | Refills: 3 | Status: SHIPPED | OUTPATIENT
Start: 2022-01-31 | End: 2023-02-22

## 2022-01-31 RX ORDER — CARVEDILOL 12.5 MG/1
TABLET ORAL
COMMUNITY
Start: 2020-06-13 | End: 2022-01-31

## 2022-01-31 RX ORDER — LOSARTAN POTASSIUM 50 MG/1
50 TABLET ORAL DAILY
Qty: 90 TABLET | Refills: 3 | Status: SHIPPED | OUTPATIENT
Start: 2022-01-31 | End: 2023-02-22

## 2022-01-31 ASSESSMENT — MIFFLIN-ST. JEOR: SCORE: 1356.32

## 2022-02-26 DIAGNOSIS — E78.5 HYPERLIPIDEMIA LDL GOAL <130: ICD-10-CM

## 2022-02-26 DIAGNOSIS — E03.4 HYPOTHYROIDISM DUE TO ACQUIRED ATROPHY OF THYROID: ICD-10-CM

## 2022-02-28 RX ORDER — LEVOTHYROXINE SODIUM 112 UG/1
TABLET ORAL
Qty: 30 TABLET | Refills: 0 | OUTPATIENT
Start: 2022-02-28

## 2022-02-28 RX ORDER — SIMVASTATIN 10 MG
TABLET ORAL
Qty: 30 TABLET | Refills: 0 | OUTPATIENT
Start: 2022-02-28

## 2022-06-30 ENCOUNTER — TRANSFERRED RECORDS (OUTPATIENT)
Dept: FAMILY MEDICINE | Facility: CLINIC | Age: 77
End: 2022-06-30

## 2022-08-09 ENCOUNTER — TRANSFERRED RECORDS (OUTPATIENT)
Dept: FAMILY MEDICINE | Facility: CLINIC | Age: 77
End: 2022-08-09

## 2022-08-10 ENCOUNTER — ANCILLARY PROCEDURE (OUTPATIENT)
Dept: MAMMOGRAPHY | Facility: CLINIC | Age: 77
End: 2022-08-10
Payer: COMMERCIAL

## 2022-08-10 DIAGNOSIS — Z12.31 VISIT FOR SCREENING MAMMOGRAM: ICD-10-CM

## 2022-08-10 PROCEDURE — 77067 SCR MAMMO BI INCL CAD: CPT | Mod: TC | Performed by: RADIOLOGY

## 2022-08-15 ENCOUNTER — ANCILLARY PROCEDURE (OUTPATIENT)
Dept: CARDIOLOGY | Facility: CLINIC | Age: 77
End: 2022-08-15
Attending: PHYSICIAN ASSISTANT
Payer: COMMERCIAL

## 2022-08-15 DIAGNOSIS — I35.0 NONRHEUMATIC AORTIC VALVE STENOSIS: ICD-10-CM

## 2022-08-15 LAB — LVEF ECHO: NORMAL

## 2022-08-15 PROCEDURE — 93306 TTE W/DOPPLER COMPLETE: CPT | Mod: GC | Performed by: INTERNAL MEDICINE

## 2022-08-18 ENCOUNTER — TELEPHONE (OUTPATIENT)
Dept: CARDIOLOGY | Facility: CLINIC | Age: 77
End: 2022-08-18

## 2022-08-18 DIAGNOSIS — I35.0 AORTIC VALVE STENOSIS, ETIOLOGY OF CARDIAC VALVE DISEASE UNSPECIFIED: Primary | ICD-10-CM

## 2022-08-18 NOTE — TELEPHONE ENCOUNTER
[facility] Call Center    Phone Message    May a detailed message be left on voicemail: yes     Reason for Call: Appointment Intake    Referring Provider Name: Kenna Garcia PA  Diagnosis and/or Symptoms: Aortic valve stenosis, etiology of cardiac valve disease unspecified [I35.0]      8/18/22 LVM for pt to wait for clinic to call and schedule.    Action Taken: Message routed to:  [facility] ([facility]): Cardiology    Travel Screening: Not Applicable

## 2022-08-19 NOTE — TELEPHONE ENCOUNTER
Checked patient chart; patient is scheduled for new cardiology appointment with LONDON Orellana at the [place] location on 9/15.    MORE Lemus

## 2022-09-15 ENCOUNTER — OFFICE VISIT (OUTPATIENT)
Dept: CARDIOLOGY | Facility: CLINIC | Age: 77
End: 2022-09-15
Payer: COMMERCIAL

## 2022-09-15 VITALS
BODY MASS INDEX: 33.64 KG/M2 | SYSTOLIC BLOOD PRESSURE: 137 MMHG | DIASTOLIC BLOOD PRESSURE: 81 MMHG | WEIGHT: 196 LBS | HEART RATE: 64 BPM | OXYGEN SATURATION: 96 %

## 2022-09-15 DIAGNOSIS — I10 BENIGN ESSENTIAL HYPERTENSION: ICD-10-CM

## 2022-09-15 DIAGNOSIS — I35.0 AORTIC VALVE STENOSIS, ETIOLOGY OF CARDIAC VALVE DISEASE UNSPECIFIED: Primary | ICD-10-CM

## 2022-09-15 DIAGNOSIS — E78.5 HYPERLIPIDEMIA WITH TARGET LDL LESS THAN 70: ICD-10-CM

## 2022-09-15 PROCEDURE — 99204 OFFICE O/P NEW MOD 45 MIN: CPT | Performed by: INTERNAL MEDICINE

## 2022-09-15 NOTE — LETTER
9/15/2022      RE: Jenna Kaur  [address] [place] [address]       Dear Colleague,    Thank you for the opportunity to participate in the care of your patient, Jenna Kaur, at the [facility]. Please see a copy of my visit note below.       SUBJECTIVE:  Jenna Kaur is a 76 year old female who presents for evaluation of aortic stenosis.  Patient had a colonoscopy in 2017.  At that time she was told that she has a cardiac murmur.  An echocardiogram at that point showed mild aortic stenosis with a mean gradient of 14 mmHg.  To reevaluate PCP ordered a recent echocardiogram which was reported as showing moderate to severe aortic stenosis.  Patient had an episode of shortness of breath while in [place].  She happened to climb 3 flights of stairs and had significant knee joint pain as well as meniscus tear.  With this she had significant shortness of breath as it was difficult to ambulate.  Otherwise patient never had any shortness of breath or exertional chest pain no other symptoms.  Currently denied any cardiac symptoms.  Fairly healthy and active.  2009 patient was evaluated for chest pain.  Had an exercise stress test where echocardiographic images were normal but EKG showed 1 to 2 mm ST depression which was upsloping.  Because of this she had a coronary angiogram which showed no significant flow-limiting lesions.  Since then patient remained asymptomatic.  Cardiac risk factors are hypertension, hyperlipidemia and family history of premature coronary artery disease.  Current cardiac medications are carvedilol 25 mg twice a day, simvastatin 10 mg daily and Cozaar 50 mg daily.  Last LDL was 72 and HDL 64.  Blood pressure is well controlled.  Patient Active Problem List    Diagnosis Date Noted     Decreased GFR 12/10/2019     Priority: Medium     Essential hypertension with goal blood pressure less than  140/90 12/03/2018     Priority: Medium     Osteopenia of both hips 12/03/2018     Priority: Medium     Nonrheumatic aortic valve stenosis 12/03/2018     Priority: Medium     Hypothyroidism due to acquired atrophy of thyroid 11/01/2018     Priority: Medium     Hyperlipidemia LDL goal <130 11/01/2018     Priority: Medium    .  Current Outpatient Medications   Medication Sig     aspirin 81 MG EC tablet Take 81 mg by mouth daily     Calcium Carbonate-Vitamin D (CALCIUM 500 + D PO) Take  by mouth daily. Takes 2 tablets     carvedilol (COREG) 25 MG tablet TAKE ONE TABLET BY MOUTH TWICE A DAY WITH MEALS     levothyroxine (SYNTHROID/LEVOTHROID) 112 MCG tablet Take 1 tablet (112 mcg) by mouth daily     losartan (COZAAR) 50 MG tablet Take 1 tablet (50 mg) by mouth daily     polyethylene glycol (MIRALAX/GLYCOLAX) powder as needed     simvastatin (ZOCOR) 10 MG tablet Take 1 tablet (10 mg) by mouth At Bedtime     No current facility-administered medications for this visit.     Past Medical History:   Diagnosis Date     Heart disease      Hypertension      Past Surgical History:   Procedure Laterality Date     ABDOMEN SURGERY       CHOLECYSTECTOMY       COLONOSCOPY       EYE SURGERY  Cataract surgery 11/2 & 11/16/2021     Allergies   Allergen Reactions     Erythromycin GI Disturbance     Nausea and sick feeling     Social History     Socioeconomic History     Marital status:      Spouse name: Not on file     Number of children: Not on file     Years of education: Not on file     Highest education level: Not on file   Occupational History     Not on file   Tobacco Use     Smoking status: Never Smoker     Smokeless tobacco: Never Used   Vaping Use     Vaping Use: Never used   Substance and Sexual Activity     Alcohol use: Never     Drug use: Never     Sexual activity: Never   Other Topics Concern     Parent/sibling w/ CABG, MI or angioplasty before 65F 55M? No   Social History Narrative     Not on file     Social  Determinants of Health     Financial Resource Strain: Not on file   Food Insecurity: Not on file   Transportation Needs: Not on file   Physical Activity: Not on file   Stress: Not on file   Social Connections: Not on file   Intimate Partner Violence: Not on file   Housing Stability: Not on file     Family History   Problem Relation Age of Onset     Breast Cancer Mother 70     Colon Cancer Father 59     Arrhythmia Brother      Sleep Apnea Brother           REVIEW OF SYSTEMS:  General: negative, fever, chills, night sweats  Skin: negative, acne, rash and scaling  Eyes: negative, double vision, eye pain and photophobia  Ears/Nose/Throat: negative, nasal congestion and purulent rhinorrhea  Respiratory: No dyspnea on exertion, No cough, No hemoptysis and negative  Cardiovascular: negative, palpitations, tachycardia, irregular heart beat, chest pain, exertional chest pain or pressure, paroxysmal nocturnal dyspnea, dyspnea on exertion and orthopnea       OBJECTIVE:  Blood pressure 137/81, pulse 64, weight 88.9 kg (196 lb), SpO2 96 %, not currently breastfeeding.  General Appearance: healthy, alert, active and no distress  Head: Normocephalic. No masses, lesions, tenderness or abnormalities  Eyes: conjuctiva clear, PERRL, EOM intact  Ears: External ears normal. Canals clear. TM's normal.  Nose: Nares normal  Mouth: normal  Neck: Supple, no cervical adenopathy, no thyromegaly  Lungs: clear to auscultation  Cardiac: regular rate and rhythm, normal S1 and S2, ESM.       ASSESSMENT/PLAN:  Patient here for evaluation of aortic stenosis.  A murmur was detected in 2017 while patient was undergoing a colonoscopy.  Subsequent echocardiogram showed mild aortic stenosis with a mean gradient of 14 mmHg.  To reevaluate PCP ordered an echocardiogram and this was reported as showing moderate to severe aortic stenosis.  Patient had 1 episode of shortness of breath related to left knee joint injury and difficulty in ambulating.  Otherwise  patient never had shortness of breath on exertion.  In fact she do not have any significant cardiac symptoms currently.  She is fairly active.  Cardiac risk factors are hypertension and hyperlipidemia both are well controlled.  Family history of premature coronary artery disease.  Patient had an exercise stress echo in 2009.  Echo was normal but EKG showed 1 to 2 mm of upsloping ST segment depression.  Subsequently patient had a coronary CT angiogram with the report as follows.    Calcium Score:  Total score 183 which puts the patient in 75th to upper   90th percentile of age and gender matched peer group.  Positive coronary   calcification definitely suggests coronary atherosclerosis and suggests   aggressive risk factor modification.  LAD score 183, circumflex 0, RCA 0.   Plaque Present:  Both soft and hard plaque in the proximal LAD.   Coronary Anatomy:  Dominance left.      L Main:  Appears patent.  No significant lesion.    LAD:  Significant proximal calcification, however, proximal LAD appeared   moderately stenotic, does not seem to have significant severe obstructive   disease.  Mid and distal LAD is well visualized and distal LAD is wrapping   around apex and appears normal.      Diag 1:  Patent.      Diag 2:  Patent.    Int. Ramus:  None.    L Circumflex:  Large vessel and patent proximally.        OM1:  Large vessel and patent.      OM2:  Patent.      LPDA:  Patent.    RCA:  Nondominant, relatively small.      PDA:  None.      MERVAT:  None.   Recent echocardiogram reviewed.  Normal biventricular function.  Aortic valve calcification.  Leaflets not well visualized.  Mean gradient 39 mmHg.  Peak velocity 3.6 m/s normal stroke-volume index.  DVI 0.24.  From gradient measurements moderate aortic stenosis.  Calculated valve area below is 0.9 to 1.1 cm quiet.  Result discussed with patient.  There is progression of aortic stenosis since 2017.  As patient is totally asymptomatic no intervention is needed at this  time.  Symptoms of severe aortic stenosis described.  Patient will contact the clinic if she develops any of the symptoms.  Will reassess aortic valve in 1 year with a repeat echocardiogram.  Patient is advised to continue her current medications.  Total visit duration 45 minutes.  This included face-to-face interview, physical exam, chart review, review of outside records echocardiogram reports echocardiogram done recently and coronary angiogram report and documentation.      Please do not hesitate to contact me if you have any questions/concerns.     Sincerely,     BELLO Haas MD

## 2022-09-15 NOTE — PROGRESS NOTES
SUBJECTIVE:  Jenna Kaur is a 76 year old female who presents for evaluation of aortic stenosis.  Patient had a colonoscopy in 2017.  At that time she was told that she has a cardiac murmur.  An echocardiogram at that point showed mild aortic stenosis with a mean gradient of 14 mmHg.  To reevaluate PCP ordered a recent echocardiogram which was reported as showing moderate to severe aortic stenosis.  Patient had an episode of shortness of breath while in [place].  She happened to climb 3 flights of stairs and had significant knee joint pain as well as meniscus tear.  With this she had significant shortness of breath as it was difficult to ambulate.  Otherwise patient never had any shortness of breath or exertional chest pain no other symptoms.  Currently denied any cardiac symptoms.  Fairly healthy and active.  2009 patient was evaluated for chest pain.  Had an exercise stress test where echocardiographic images were normal but EKG showed 1 to 2 mm ST depression which was upsloping.  Because of this she had a coronary angiogram which showed no significant flow-limiting lesions.  Since then patient remained asymptomatic.  Cardiac risk factors are hypertension, hyperlipidemia and family history of premature coronary artery disease.  Current cardiac medications are carvedilol 25 mg twice a day, simvastatin 10 mg daily and Cozaar 50 mg daily.  Last LDL was 72 and HDL 64.  Blood pressure is well controlled.  Patient Active Problem List    Diagnosis Date Noted     Decreased GFR 12/10/2019     Priority: Medium     Essential hypertension with goal blood pressure less than 140/90 12/03/2018     Priority: Medium     Osteopenia of both hips 12/03/2018     Priority: Medium     Nonrheumatic aortic valve stenosis 12/03/2018     Priority: Medium     Hypothyroidism due to acquired atrophy of thyroid 11/01/2018     Priority: Medium     Hyperlipidemia LDL goal <130 11/01/2018     Priority: Medium    .  Current Outpatient  Medications   Medication Sig     aspirin 81 MG EC tablet Take 81 mg by mouth daily     Calcium Carbonate-Vitamin D (CALCIUM 500 + D PO) Take  by mouth daily. Takes 2 tablets     carvedilol (COREG) 25 MG tablet TAKE ONE TABLET BY MOUTH TWICE A DAY WITH MEALS     levothyroxine (SYNTHROID/LEVOTHROID) 112 MCG tablet Take 1 tablet (112 mcg) by mouth daily     losartan (COZAAR) 50 MG tablet Take 1 tablet (50 mg) by mouth daily     polyethylene glycol (MIRALAX/GLYCOLAX) powder as needed     simvastatin (ZOCOR) 10 MG tablet Take 1 tablet (10 mg) by mouth At Bedtime     No current facility-administered medications for this visit.     Past Medical History:   Diagnosis Date     Heart disease      Hypertension      Past Surgical History:   Procedure Laterality Date     ABDOMEN SURGERY       CHOLECYSTECTOMY       COLONOSCOPY       EYE SURGERY  Cataract surgery 11/2 & 11/16/2021     Allergies   Allergen Reactions     Erythromycin GI Disturbance     Nausea and sick feeling     Social History     Socioeconomic History     Marital status:      Spouse name: Not on file     Number of children: Not on file     Years of education: Not on file     Highest education level: Not on file   Occupational History     Not on file   Tobacco Use     Smoking status: Never Smoker     Smokeless tobacco: Never Used   Vaping Use     Vaping Use: Never used   Substance and Sexual Activity     Alcohol use: Never     Drug use: Never     Sexual activity: Never   Other Topics Concern     Parent/sibling w/ CABG, MI or angioplasty before 65F 55M? No   Social History Narrative     Not on file     Social Determinants of Health     Financial Resource Strain: Not on file   Food Insecurity: Not on file   Transportation Needs: Not on file   Physical Activity: Not on file   Stress: Not on file   Social Connections: Not on file   Intimate Partner Violence: Not on file   Housing Stability: Not on file     Family History   Problem Relation Age of Onset     Breast  Cancer Mother 70     Colon Cancer Father 59     Arrhythmia Brother      Sleep Apnea Brother           REVIEW OF SYSTEMS:  General: negative, fever, chills, night sweats  Skin: negative, acne, rash and scaling  Eyes: negative, double vision, eye pain and photophobia  Ears/Nose/Throat: negative, nasal congestion and purulent rhinorrhea  Respiratory: No dyspnea on exertion, No cough, No hemoptysis and negative  Cardiovascular: negative, palpitations, tachycardia, irregular heart beat, chest pain, exertional chest pain or pressure, paroxysmal nocturnal dyspnea, dyspnea on exertion and orthopnea       OBJECTIVE:  Blood pressure 137/81, pulse 64, weight 88.9 kg (196 lb), SpO2 96 %, not currently breastfeeding.  General Appearance: healthy, alert, active and no distress  Head: Normocephalic. No masses, lesions, tenderness or abnormalities  Eyes: conjuctiva clear, PERRL, EOM intact  Ears: External ears normal. Canals clear. TM's normal.  Nose: Nares normal  Mouth: normal  Neck: Supple, no cervical adenopathy, no thyromegaly  Lungs: clear to auscultation  Cardiac: regular rate and rhythm, normal S1 and S2, ESM.       ASSESSMENT/PLAN:  Patient here for evaluation of aortic stenosis.  A murmur was detected in 2017 while patient was undergoing a colonoscopy.  Subsequent echocardiogram showed mild aortic stenosis with a mean gradient of 14 mmHg.  To reevaluate PCP ordered an echocardiogram and this was reported as showing moderate to severe aortic stenosis.  Patient had 1 episode of shortness of breath related to left knee joint injury and difficulty in ambulating.  Otherwise patient never had shortness of breath on exertion.  In fact she do not have any significant cardiac symptoms currently.  She is fairly active.  Cardiac risk factors are hypertension and hyperlipidemia both are well controlled.  Family history of premature coronary artery disease.  Patient had an exercise stress echo in 2009.  Echo was normal but EKG showed  1 to 2 mm of upsloping ST segment depression.  Subsequently patient had a coronary CT angiogram with the report as follows.    Calcium Score:  Total score 183 which puts the patient in 75th to upper   90th percentile of age and gender matched peer group.  Positive coronary   calcification definitely suggests coronary atherosclerosis and suggests   aggressive risk factor modification.  LAD score 183, circumflex 0, RCA 0.   Plaque Present:  Both soft and hard plaque in the proximal LAD.   Coronary Anatomy:  Dominance left.      L Main:  Appears patent.  No significant lesion.    LAD:  Significant proximal calcification, however, proximal LAD appeared   moderately stenotic, does not seem to have significant severe obstructive   disease.  Mid and distal LAD is well visualized and distal LAD is wrapping   around apex and appears normal.      Diag 1:  Patent.      Diag 2:  Patent.    Int. Ramus:  None.    L Circumflex:  Large vessel and patent proximally.        OM1:  Large vessel and patent.      OM2:  Patent.      LPDA:  Patent.    RCA:  Nondominant, relatively small.      PDA:  None.      MERVAT:  None.   Recent echocardiogram reviewed.  Normal biventricular function.  Aortic valve calcification.  Leaflets not well visualized.  Mean gradient 39 mmHg.  Peak velocity 3.6 m/s normal stroke-volume index.  DVI 0.24.  From gradient measurements moderate aortic stenosis.  Calculated valve area below is 0.9 to 1.1 cm quiet.  Result discussed with patient.  There is progression of aortic stenosis since 2017.  As patient is totally asymptomatic no intervention is needed at this time.  Symptoms of severe aortic stenosis described.  Patient will contact the clinic if she develops any of the symptoms.  Will reassess aortic valve in 1 year with a repeat echocardiogram.  Patient is advised to continue her current medications.  Total visit duration 45 minutes.  This included face-to-face interview, physical exam, chart review, review of  outside records echocardiogram reports echocardiogram done recently and coronary angiogram report and documentation.

## 2022-09-15 NOTE — NURSING NOTE
"Chief Complaint   Patient presents with     Heart Problem     Dr Orellana: new pt- referred for aortic stenosis. hx of SOB, HERNANDEZ, HTN, HLD, heart failure.       Initial /81 (BP Location: Right arm, Patient Position: Chair, Cuff Size: Adult Large)   Pulse 64   Wt 88.9 kg (196 lb)   SpO2 96%   BMI 33.64 kg/m   Estimated body mass index is 33.64 kg/m  as calculated from the following:    Height as of 1/31/22: 1.626 m (5' 4\").    Weight as of this encounter: 88.9 kg (196 lb)..  BP completed using cuff size: EMMANUEL Del Cid  "

## 2022-09-15 NOTE — PATIENT INSTRUCTIONS
Thank you for coming to the [facility] @ [place]; please note the following instructions:     Dr. Orellana recommends to follow up in 1 year with an echo prior.  The cardiology team will contact you to schedule when the time gets closer.        If you have any questions regarding your visit please contact your care team:     Cardiology  Telephone Number   Mojgan RODNEY, RN  Lisa MOODY, RN   Jennifer ONEILL, RMA  Ama BEVERLY, RMSYDNEY COVARRUBIAS, Visit Facilitator   [phone] (option 1)   For scheduling appts:     [phone] (select option 1)       For the Device Clinic (Pacemakers and ICD's)  RN's :  Char Benavidez   During business hours: [phone]    *After business hours:  [phone] (select option 4)      Normal test result notifications will be released via [employer] or mailed within 7 business days.  All other test results, will be communicated via telephone once reviewed by your cardiologist.    If you need a medication refill please contact your pharmacy.  Please allow 3 business days for your refill to be completed.    As always, thank you for trusting us with your health care needs!

## 2022-09-19 NOTE — TELEPHONE ENCOUNTER
Routing refill request to provider for review/approval because:  BP Readings from Last 3 Encounters:   10/18/21 (!) 168/82   06/16/21 114/80   06/16/21 122/80             
82

## 2022-10-09 ENCOUNTER — HEALTH MAINTENANCE LETTER (OUTPATIENT)
Age: 77
End: 2022-10-09

## 2022-10-12 DIAGNOSIS — I10 ESSENTIAL HYPERTENSION WITH GOAL BLOOD PRESSURE LESS THAN 140/90: ICD-10-CM

## 2022-10-13 RX ORDER — CARVEDILOL 25 MG/1
TABLET ORAL
Qty: 180 TABLET | Refills: 0 | Status: SHIPPED | OUTPATIENT
Start: 2022-10-13 | End: 2023-01-09

## 2023-01-08 DIAGNOSIS — I10 ESSENTIAL HYPERTENSION WITH GOAL BLOOD PRESSURE LESS THAN 140/90: ICD-10-CM

## 2023-01-09 RX ORDER — CARVEDILOL 25 MG/1
TABLET ORAL
Qty: 180 TABLET | Refills: 0 | Status: SHIPPED | OUTPATIENT
Start: 2023-01-09 | End: 2023-02-22

## 2023-02-21 NOTE — PROGRESS NOTES
"  Assessment & Plan       ICD-10-CM    1. Essential hypertension with goal blood pressure less than 140/90  I10 BASIC METABOLIC PANEL     Albumin Random Urine Quantitative with Creat Ratio     losartan (COZAAR) 50 MG tablet     carvedilol (COREG) 25 MG tablet     Albumin Random Urine Quantitative with Creat Ratio     BASIC METABOLIC PANEL      2. Hyperlipidemia LDL goal <130  E78.5 Lipid panel reflex to direct LDL Non-fasting     simvastatin (ZOCOR) 10 MG tablet     Lipid panel reflex to direct LDL Non-fasting      3. Hypothyroidism due to acquired atrophy of thyroid  E03.4 TSH WITH FREE T4 REFLEX     levothyroxine (SYNTHROID/LEVOTHROID) 112 MCG tablet     TSH WITH FREE T4 REFLEX      4. Osteopenia of both hips  M85.851 DEXA HIP/PELVIS/SPINE - Future    M85.852           1-3) Routine labs in process. Blood pressure at goal. Meds renewed, no changes.    4) Due for repeat DEXA      Ordering of each unique test  Prescription drug management         BMI:   Estimated body mass index is 33.74 kg/m  as calculated from the following:    Height as of this encounter: 1.647 m (5' 4.84\").    Weight as of this encounter: 91.5 kg (201 lb 12.8 oz).       Return in about 1 year (around 2/22/2024) for your annual physical, a med check, fasting labs, with Kenna, in person.    Kenna Garcia PA-C  [facility] LUCAS [place] is a 77 year old, presenting for the following health issues:  Recheck Medication and Medication Reconciliation (Provider-Medications are missing and need to be added. Ketorolac drops )      History of Present Illness       Hypertension: She presents for follow up of hypertension.  She does not check blood pressure  regularly outside of the clinic. Outpatient blood pressures have not been over 140/90. She does not follow a low salt diet.     Hypothyroidism:     Since last visit, patient describes the following symptoms::  None        Hyperlipidemia Follow-Up      Are you regularly " "taking any medication or supplement to lower your cholesterol?   Yes- simvastatin (ZOCOR) 10 MG tablet    Are you having muscle aches or other side effects that you think could be caused by your cholesterol lowering medication?  No      How many servings of fruits and vegetables do you eat daily?  0-1    On average, how many sweetened beverages do you drink each day (Examples: soda, juice, sweet tea, etc.  Do NOT count diet or artificially sweetened beverages)?   0    How many days per week do you exercise enough to make your heart beat faster? 3 or less    How many minutes a day do you exercise enough to make your heart beat faster? 9 or less    How many days per week do you miss taking your medication? 0        Review of Systems   Constitutional, cardiovascular, endocrine and psych systems are negative, except as otherwise noted.      Objective    /76 (BP Location: Right arm, Patient Position: Sitting, Cuff Size: Adult Regular)   Pulse 69   Temp 97.4  F (36.3  C) (Tympanic)   Resp 20   Ht 1.647 m (5' 4.84\")   Wt 91.5 kg (201 lb 12.8 oz)   SpO2 97%   BMI 33.74 kg/m    Body mass index is 33.74 kg/m .  Physical Exam   GENERAL: healthy, alert and no distress  RESP: lungs clear to auscultation - no rales, rhonchi or wheezes  CV: regular rates and rhythm, normal S1 S2, no S3 or S4 and no murmur, click or rub  MS: no gross musculoskeletal defects noted, no edema  SKIN: no suspicious lesions or rashes  NEURO: Normal strength and tone, mentation intact and speech normal  PSYCH: mentation appears normal, affect normal/bright              "

## 2023-02-22 ENCOUNTER — OFFICE VISIT (OUTPATIENT)
Dept: FAMILY MEDICINE | Facility: CLINIC | Age: 78
End: 2023-02-22
Payer: COMMERCIAL

## 2023-02-22 VITALS
RESPIRATION RATE: 20 BRPM | BODY MASS INDEX: 33.62 KG/M2 | TEMPERATURE: 97.4 F | DIASTOLIC BLOOD PRESSURE: 76 MMHG | OXYGEN SATURATION: 97 % | WEIGHT: 201.8 LBS | HEART RATE: 69 BPM | HEIGHT: 65 IN | SYSTOLIC BLOOD PRESSURE: 132 MMHG

## 2023-02-22 DIAGNOSIS — M85.851 OSTEOPENIA OF BOTH HIPS: ICD-10-CM

## 2023-02-22 DIAGNOSIS — E78.5 HYPERLIPIDEMIA LDL GOAL <130: ICD-10-CM

## 2023-02-22 DIAGNOSIS — E03.4 HYPOTHYROIDISM DUE TO ACQUIRED ATROPHY OF THYROID: ICD-10-CM

## 2023-02-22 DIAGNOSIS — M85.852 OSTEOPENIA OF BOTH HIPS: ICD-10-CM

## 2023-02-22 DIAGNOSIS — I10 ESSENTIAL HYPERTENSION WITH GOAL BLOOD PRESSURE LESS THAN 140/90: Primary | ICD-10-CM

## 2023-02-22 LAB
ANION GAP SERPL CALCULATED.3IONS-SCNC: 5 MMOL/L (ref 3–14)
BUN SERPL-MCNC: 25 MG/DL (ref 7–30)
CALCIUM SERPL-MCNC: 9.3 MG/DL (ref 8.5–10.1)
CHLORIDE BLD-SCNC: 111 MMOL/L (ref 94–109)
CHOLEST SERPL-MCNC: 153 MG/DL
CO2 SERPL-SCNC: 28 MMOL/L (ref 20–32)
CREAT SERPL-MCNC: 0.79 MG/DL (ref 0.52–1.04)
CREAT UR-MCNC: 179 MG/DL
FASTING STATUS PATIENT QL REPORTED: YES
GFR SERPL CREATININE-BSD FRML MDRD: 76 ML/MIN/1.73M2
GLUCOSE BLD-MCNC: 119 MG/DL (ref 70–99)
HDLC SERPL-MCNC: 69 MG/DL
LDLC SERPL CALC-MCNC: 64 MG/DL
MICROALBUMIN UR-MCNC: 23 MG/L
MICROALBUMIN/CREAT UR: 12.85 MG/G CR (ref 0–25)
NONHDLC SERPL-MCNC: 84 MG/DL
POTASSIUM BLD-SCNC: 4.1 MMOL/L (ref 3.4–5.3)
SODIUM SERPL-SCNC: 144 MMOL/L (ref 133–144)
T4 FREE SERPL-MCNC: 1.35 NG/DL (ref 0.76–1.46)
TRIGL SERPL-MCNC: 102 MG/DL
TSH SERPL DL<=0.005 MIU/L-ACNC: 0.33 MU/L (ref 0.4–4)

## 2023-02-22 PROCEDURE — 36415 COLL VENOUS BLD VENIPUNCTURE: CPT | Performed by: PHYSICIAN ASSISTANT

## 2023-02-22 PROCEDURE — 80048 BASIC METABOLIC PNL TOTAL CA: CPT | Performed by: PHYSICIAN ASSISTANT

## 2023-02-22 PROCEDURE — 99214 OFFICE O/P EST MOD 30 MIN: CPT | Performed by: PHYSICIAN ASSISTANT

## 2023-02-22 PROCEDURE — 82570 ASSAY OF URINE CREATININE: CPT | Performed by: PHYSICIAN ASSISTANT

## 2023-02-22 PROCEDURE — 84443 ASSAY THYROID STIM HORMONE: CPT | Performed by: PHYSICIAN ASSISTANT

## 2023-02-22 PROCEDURE — 84439 ASSAY OF FREE THYROXINE: CPT | Performed by: PHYSICIAN ASSISTANT

## 2023-02-22 PROCEDURE — 80061 LIPID PANEL: CPT | Performed by: PHYSICIAN ASSISTANT

## 2023-02-22 PROCEDURE — 82043 UR ALBUMIN QUANTITATIVE: CPT | Performed by: PHYSICIAN ASSISTANT

## 2023-02-22 RX ORDER — LOSARTAN POTASSIUM 50 MG/1
50 TABLET ORAL DAILY
Qty: 90 TABLET | Refills: 3 | Status: SHIPPED | OUTPATIENT
Start: 2023-02-22 | End: 2023-10-30

## 2023-02-22 RX ORDER — LEVOTHYROXINE SODIUM 112 UG/1
112 TABLET ORAL DAILY
Qty: 90 TABLET | Refills: 3 | Status: SHIPPED | OUTPATIENT
Start: 2023-02-22 | End: 2024-02-07

## 2023-02-22 RX ORDER — SIMVASTATIN 10 MG
10 TABLET ORAL AT BEDTIME
Qty: 90 TABLET | Refills: 3 | Status: SHIPPED | OUTPATIENT
Start: 2023-02-22 | End: 2024-02-07

## 2023-02-22 RX ORDER — CARVEDILOL 25 MG/1
25 TABLET ORAL 2 TIMES DAILY WITH MEALS
Qty: 180 TABLET | Refills: 3 | Status: ON HOLD | OUTPATIENT
Start: 2023-02-22 | End: 2023-10-26

## 2023-02-22 ASSESSMENT — PAIN SCALES - GENERAL: PAINLEVEL: NO PAIN (0)

## 2023-03-25 ENCOUNTER — HEALTH MAINTENANCE LETTER (OUTPATIENT)
Age: 78
End: 2023-03-25

## 2023-08-15 ENCOUNTER — ANCILLARY PROCEDURE (OUTPATIENT)
Dept: MAMMOGRAPHY | Facility: CLINIC | Age: 78
End: 2023-08-15
Attending: PHYSICIAN ASSISTANT
Payer: COMMERCIAL

## 2023-08-15 ENCOUNTER — ANCILLARY PROCEDURE (OUTPATIENT)
Dept: BONE DENSITY | Facility: CLINIC | Age: 78
End: 2023-08-15
Attending: PHYSICIAN ASSISTANT
Payer: COMMERCIAL

## 2023-08-15 DIAGNOSIS — M85.851 OSTEOPENIA OF BOTH HIPS: ICD-10-CM

## 2023-08-15 DIAGNOSIS — M85.852 OSTEOPENIA OF BOTH HIPS: ICD-10-CM

## 2023-08-15 DIAGNOSIS — Z12.31 VISIT FOR SCREENING MAMMOGRAM: ICD-10-CM

## 2023-08-15 PROCEDURE — 77067 SCR MAMMO BI INCL CAD: CPT | Mod: TC | Performed by: RADIOLOGY

## 2023-08-15 PROCEDURE — 77080 DXA BONE DENSITY AXIAL: CPT | Performed by: INTERNAL MEDICINE

## 2023-09-20 ENCOUNTER — ANCILLARY PROCEDURE (OUTPATIENT)
Dept: CARDIOLOGY | Facility: CLINIC | Age: 78
End: 2023-09-20
Attending: INTERNAL MEDICINE
Payer: COMMERCIAL

## 2023-09-20 DIAGNOSIS — I35.0 AORTIC VALVE STENOSIS, ETIOLOGY OF CARDIAC VALVE DISEASE UNSPECIFIED: ICD-10-CM

## 2023-09-20 LAB — LVEF ECHO: NORMAL

## 2023-09-20 PROCEDURE — 93306 TTE W/DOPPLER COMPLETE: CPT | Mod: GC | Performed by: INTERNAL MEDICINE

## 2023-09-21 ENCOUNTER — CARE COORDINATION (OUTPATIENT)
Dept: CARDIOLOGY | Facility: CLINIC | Age: 78
End: 2023-09-21

## 2023-09-21 ENCOUNTER — OFFICE VISIT (OUTPATIENT)
Dept: CARDIOLOGY | Facility: CLINIC | Age: 78
End: 2023-09-21
Attending: INTERNAL MEDICINE
Payer: COMMERCIAL

## 2023-09-21 VITALS
DIASTOLIC BLOOD PRESSURE: 78 MMHG | SYSTOLIC BLOOD PRESSURE: 126 MMHG | BODY MASS INDEX: 32.94 KG/M2 | WEIGHT: 197 LBS | OXYGEN SATURATION: 97 % | HEART RATE: 65 BPM

## 2023-09-21 DIAGNOSIS — I35.0 SEVERE AORTIC STENOSIS: Primary | ICD-10-CM

## 2023-09-21 DIAGNOSIS — I35.0 AORTIC VALVE STENOSIS, ETIOLOGY OF CARDIAC VALVE DISEASE UNSPECIFIED: ICD-10-CM

## 2023-09-21 PROCEDURE — 99214 OFFICE O/P EST MOD 30 MIN: CPT | Performed by: INTERNAL MEDICINE

## 2023-09-21 NOTE — PROGRESS NOTES
Date: 9/21/2023    Time of Call: 10:08 AM     Diagnosis:  Severe aortic stenosis     [ TORB ] Ordering provider: Cherie Barrera NP  Order:   TAVR CT  CBC and BMP     Order received by: Haily Dougherty RN     Follow-up/additional notes:   Referral to the valve clinic from Dr. Orellana for critical aortic stenosis. The above has been ordered and will be scheduled.

## 2023-09-21 NOTE — NURSING NOTE
"Chief Complaint   Patient presents with    RECHECK     Return General Cardiology for Aortic valve stenosis       Initial /78 (BP Location: Right arm, Patient Position: Chair, Cuff Size: Adult Large)   Pulse 65   Wt 89.4 kg (197 lb)   SpO2 97%   BMI 32.94 kg/m   Estimated body mass index is 32.94 kg/m  as calculated from the following:    Height as of 2/22/23: 1.647 m (5' 4.84\").    Weight as of this encounter: 89.4 kg (197 lb)..  BP completed using cuff size: EMMANUEL Del Cid    "

## 2023-09-21 NOTE — LETTER
9/21/2023      RE: Jenna Kaur  [address] [place] [address]       Dear Colleague,    Thank you for the opportunity to participate in the care of your patient, Jenna Kaur, at the [facility] at [facility]. Please see a copy of my visit note below.       SUBJECTIVE:  Jenna Kaur is a 77 year old female who presents for follow-up.  Aortic stenosis.  Patient was told about a cardiac murmur in 2017 while undergoing a colonoscopy evaluation.  At that time patient had an echocardiogram which showed mild aortic stenosis with a mean gradient of 17 mmHg.  Recently patient had a repeat echocardiogram and this showed moderate to severe aortic stenosis.  Currently patient joined a senior citizen exercise group.  He do admit to some shortness of breath while doing the exercise activity.  No chest pain or exertional dizziness.  Patient here for follow-up with a repeat echocardiogram to reassess aortic stenosis.    Patient Active Problem List    Diagnosis Date Noted     Decreased GFR 12/10/2019     Priority: Medium     Essential hypertension with goal blood pressure less than 140/90 12/03/2018     Priority: Medium     Osteopenia of both hips 12/03/2018     Priority: Medium     Nonrheumatic aortic valve stenosis 12/03/2018     Priority: Medium     Hypothyroidism due to acquired atrophy of thyroid 11/01/2018     Priority: Medium     Hyperlipidemia LDL goal <130 11/01/2018     Priority: Medium    .  Current Outpatient Medications   Medication Sig     aspirin 81 MG EC tablet Take 81 mg by mouth daily     Calcium Carbonate-Vitamin D (CALCIUM 500 + D PO) Take  by mouth daily. Takes 2 tablets     carvedilol (COREG) 25 MG tablet Take 1 tablet (25 mg) by mouth 2 times daily (with meals)     levothyroxine (SYNTHROID/LEVOTHROID) 112 MCG tablet Take 1 tablet (112 mcg) by mouth daily     losartan (COZAAR) 50 MG tablet Take 1 tablet (50 mg)  by mouth daily     polyethylene glycol (MIRALAX/GLYCOLAX) powder as needed     simvastatin (ZOCOR) 10 MG tablet Take 1 tablet (10 mg) by mouth At Bedtime     No current facility-administered medications for this visit.     Past Medical History:   Diagnosis Date     Heart disease      Hypertension      Past Surgical History:   Procedure Laterality Date     ABDOMEN SURGERY       CHOLECYSTECTOMY       COLONOSCOPY       EYE SURGERY  Cataract surgery 11/2 & 11/16/2021     Allergies   Allergen Reactions     Erythromycin GI Disturbance     Nausea and sick feeling     Social History     Socioeconomic History     Marital status:      Spouse name: Not on file     Number of children: Not on file     Years of education: Not on file     Highest education level: Not on file   Occupational History     Not on file   Tobacco Use     Smoking status: Never     Smokeless tobacco: Never   Vaping Use     Vaping Use: Never used   Substance and Sexual Activity     Alcohol use: Never     Drug use: Never     Sexual activity: Never   Other Topics Concern     Parent/sibling w/ CABG, MI or angioplasty before 65F 55M? No   Social History Narrative     Not on file     Social Determinants of Health     Financial Resource Strain: Not on file   Food Insecurity: Not on file   Transportation Needs: Not on file   Physical Activity: Not on file   Stress: Not on file   Social Connections: Not on file   Interpersonal Safety: Not on file   Housing Stability: Not on file     Family History   Problem Relation Age of Onset     Breast Cancer Mother 70     Colon Cancer Father 59     Arrhythmia Brother      Sleep Apnea Brother           REVIEW OF SYSTEMS:  General: negative, fever, chills, night sweats  Skin: negative, acne, rash, and scaling  Eyes: negative, double vision, eye pain, and photophobia  Ears/Nose/Throat: negative, nasal congestion, and purulent rhinorrhea  Respiratory: No cough, No hemoptysis, and negative  Cardiovascular: negative,  palpitations, tachycardia, irregular heart beat, chest pain, exertional chest pain or pressure, paroxysmal nocturnal dyspnea, and orthopnea       OBJECTIVE:  Blood pressure 126/78, pulse 65, weight 89.4 kg (197 lb), SpO2 97 %, not currently breastfeeding.  General Appearance: healthy, alert, active, and no distress  Head: Normocephalic. No masses, lesions, tenderness or abnormalities  Eyes: conjuctiva clear, PERRL, EOM intact  Ears: External ears normal. Canals clear. TM's normal.  Nose: Nares normal  Mouth: normal  Neck: Supple, no cervical adenopathy, no thyromegaly  Lungs: clear to auscultation  Cardiac: regular rate and rhythm, normal S1 and S2, no murmur     ASSESSMENT/PLAN:  Patient here for follow-up.  Aortic stenosis.    Cardiac murmur diagnosed in 2017.  Echo at that time showed mild aortic stenosis with a mean gradient of 17 mmHg.  Last year patient had a repeat echocardiogram which showed moderate to severe aortic stenosis.  Patient had a recent echocardiogram.  This showed critical aortic stenosis.  Peak aortic velocity is 5.3 m/s mean gradient 68 mmHg, calculated valve area of 1.5 and DI of 0.16.  Currently patient joint senior citizens exercise program.  She do admits some shortness of breath while exercising.  No chest pain or dizziness.  Echo result discussed with patient.  Her aortic stenosis is severe enough to proceed with intervention.  Patient will be referred to valve clinic.  Patient had an abnormal stress test in 2009.  Imaging portion of the test was normal but EKG showed up to 1 to 2 mm of upsloping ST depression.  This was followed by a CT coronary angiogram which showed 40 to 50% proximal LAD disease.  Total visit duration 20 minutes.  This included face-to-face interview, physical exam, chart review, review of echocardiograms and documentation.      Please do not hesitate to contact me if you have any questions/concerns.     Sincerely,     BELLO Haas MD

## 2023-09-21 NOTE — PATIENT INSTRUCTIONS
Thank you for coming to the [facility] @ [facility]; please note the following instructions:    1. Dr. Orellana has referred you to the valve clinic at the [facility] ([address], [place] [address]).  The Valve team will contact you to set up a consultation and possibly additional testing.          If you have any questions regarding your visit please contact your care team:     Cardiology  Telephone Number   Mojgan POWERS., RN  Gris GALVAN, RN  Jennifer ONEILL, EMMANUEL BEVERLY, EMMANUEL COVARRUBIAS, Visit Facilitator  [facility] [phone] (option 1)   For scheduling appts:     [phone] (select option 1)       For the Device Clinic (Pacemakers and ICD's)  RN's :  Char Benavidez   During business hours: [phone]    *After business hours:  [phone] (select option 4)      Normal test result notifications will be released via [employer] or mailed within 7 business days.  All other test results, will be communicated via telephone once reviewed by your cardiologist.    If you need a medication refill please contact your pharmacy.  Please allow 3 business days for your refill to be completed.    As always, thank you for trusting us with your health care needs!

## 2023-09-21 NOTE — PROGRESS NOTES
SUBJECTIVE:  Jenna Kaur is a 77 year old female who presents for follow-up.  Aortic stenosis.  Patient was told about a cardiac murmur in 2017 while undergoing a colonoscopy evaluation.  At that time patient had an echocardiogram which showed mild aortic stenosis with a mean gradient of 17 mmHg.  Recently patient had a repeat echocardiogram and this showed moderate to severe aortic stenosis.  Currently patient joined a senior citizen exercise group.  He do admit to some shortness of breath while doing the exercise activity.  No chest pain or exertional dizziness.  Patient here for follow-up with a repeat echocardiogram to reassess aortic stenosis.    Patient Active Problem List    Diagnosis Date Noted    Decreased GFR 12/10/2019     Priority: Medium    Essential hypertension with goal blood pressure less than 140/90 12/03/2018     Priority: Medium    Osteopenia of both hips 12/03/2018     Priority: Medium    Nonrheumatic aortic valve stenosis 12/03/2018     Priority: Medium    Hypothyroidism due to acquired atrophy of thyroid 11/01/2018     Priority: Medium    Hyperlipidemia LDL goal <130 11/01/2018     Priority: Medium    .  Current Outpatient Medications   Medication Sig    aspirin 81 MG EC tablet Take 81 mg by mouth daily    Calcium Carbonate-Vitamin D (CALCIUM 500 + D PO) Take  by mouth daily. Takes 2 tablets    carvedilol (COREG) 25 MG tablet Take 1 tablet (25 mg) by mouth 2 times daily (with meals)    levothyroxine (SYNTHROID/LEVOTHROID) 112 MCG tablet Take 1 tablet (112 mcg) by mouth daily    losartan (COZAAR) 50 MG tablet Take 1 tablet (50 mg) by mouth daily    polyethylene glycol (MIRALAX/GLYCOLAX) powder as needed    simvastatin (ZOCOR) 10 MG tablet Take 1 tablet (10 mg) by mouth At Bedtime     No current facility-administered medications for this visit.     Past Medical History:   Diagnosis Date    Heart disease     Hypertension      Past Surgical History:   Procedure Laterality Date    ABDOMEN  SURGERY      CHOLECYSTECTOMY      COLONOSCOPY      EYE SURGERY  Cataract surgery 11/2 & 11/16/2021     Allergies   Allergen Reactions    Erythromycin GI Disturbance     Nausea and sick feeling     Social History     Socioeconomic History    Marital status:      Spouse name: Not on file    Number of children: Not on file    Years of education: Not on file    Highest education level: Not on file   Occupational History    Not on file   Tobacco Use    Smoking status: Never    Smokeless tobacco: Never   Vaping Use    Vaping Use: Never used   Substance and Sexual Activity    Alcohol use: Never    Drug use: Never    Sexual activity: Never   Other Topics Concern    Parent/sibling w/ CABG, MI or angioplasty before 65F 55M? No   Social History Narrative    Not on file     Social Determinants of Health     Financial Resource Strain: Not on file   Food Insecurity: Not on file   Transportation Needs: Not on file   Physical Activity: Not on file   Stress: Not on file   Social Connections: Not on file   Interpersonal Safety: Not on file   Housing Stability: Not on file     Family History   Problem Relation Age of Onset    Breast Cancer Mother 70    Colon Cancer Father 59    Arrhythmia Brother     Sleep Apnea Brother           REVIEW OF SYSTEMS:  General: negative, fever, chills, night sweats  Skin: negative, acne, rash, and scaling  Eyes: negative, double vision, eye pain, and photophobia  Ears/Nose/Throat: negative, nasal congestion, and purulent rhinorrhea  Respiratory: No cough, No hemoptysis, and negative  Cardiovascular: negative, palpitations, tachycardia, irregular heart beat, chest pain, exertional chest pain or pressure, paroxysmal nocturnal dyspnea, and orthopnea       OBJECTIVE:  Blood pressure 126/78, pulse 65, weight 89.4 kg (197 lb), SpO2 97 %, not currently breastfeeding.  General Appearance: healthy, alert, active, and no distress  Head: Normocephalic. No masses, lesions, tenderness or abnormalities  Eyes:  conjuctiva clear, PERRL, EOM intact  Ears: External ears normal. Canals clear. TM's normal.  Nose: Nares normal  Mouth: normal  Neck: Supple, no cervical adenopathy, no thyromegaly  Lungs: clear to auscultation  Cardiac: regular rate and rhythm, normal S1 and S2, no murmur     ASSESSMENT/PLAN:  Patient here for follow-up.  Aortic stenosis.    Cardiac murmur diagnosed in 2017.  Echo at that time showed mild aortic stenosis with a mean gradient of 17 mmHg.  Last year patient had a repeat echocardiogram which showed moderate to severe aortic stenosis.  Patient had a recent echocardiogram.  This showed critical aortic stenosis.  Peak aortic velocity is 5.3 m/s mean gradient 68 mmHg, calculated valve area of 1.5 and DI of 0.16.  Currently patient joint senior citizens exercise program.  She do admits some shortness of breath while exercising.  No chest pain or dizziness.  Echo result discussed with patient.  Her aortic stenosis is severe enough to proceed with intervention.  Patient will be referred to valve clinic.  Patient had an abnormal stress test in 2009.  Imaging portion of the test was normal but EKG showed up to 1 to 2 mm of upsloping ST depression.  This was followed by a CT coronary angiogram which showed 40 to 50% proximal LAD disease.  Total visit duration 20 minutes.  This included face-to-face interview, physical exam, chart review, review of echocardiograms and documentation.

## 2023-09-25 ENCOUNTER — HOSPITAL ENCOUNTER (OUTPATIENT)
Dept: CT IMAGING | Facility: CLINIC | Age: 78
Discharge: HOME | End: 2023-09-25
Attending: NURSE PRACTITIONER
Payer: COMMERCIAL

## 2023-09-25 ENCOUNTER — APPOINTMENT (OUTPATIENT)
Dept: LAB | Facility: CLINIC | Age: 78
End: 2023-09-25
Attending: NURSE PRACTITIONER
Payer: COMMERCIAL

## 2023-09-25 DIAGNOSIS — I35.0 SEVERE AORTIC STENOSIS: ICD-10-CM

## 2023-09-25 LAB
ANION GAP SERPL CALCULATED.3IONS-SCNC: 12 MMOL/L (ref 7–15)
BUN SERPL-MCNC: 15.5 MG/DL (ref 8–23)
CALCIUM SERPL-MCNC: 9.9 MG/DL (ref 8.8–10.2)
CHLORIDE SERPL-SCNC: 106 MMOL/L (ref 98–107)
CREAT SERPL-MCNC: 0.87 MG/DL (ref 0.51–0.95)
DEPRECATED HCO3 PLAS-SCNC: 24 MMOL/L (ref 22–29)
EGFRCR SERPLBLD CKD-EPI 2021: 67 ML/MIN/1.73M2
ERYTHROCYTE [DISTWIDTH] IN BLOOD BY AUTOMATED COUNT: 11.9 % (ref 10–15)
GLUCOSE SERPL-MCNC: 111 MG/DL (ref 70–99)
HCT VFR BLD AUTO: 42.6 % (ref 35–47)
HGB BLD-MCNC: 14.9 G/DL (ref 11.7–15.7)
MCH RBC QN AUTO: 31.4 PG (ref 26.5–33)
MCHC RBC AUTO-ENTMCNC: 35 G/DL (ref 31.5–36.5)
MCV RBC AUTO: 90 FL (ref 78–100)
PLATELET # BLD AUTO: 213 10E3/UL (ref 150–450)
POTASSIUM SERPL-SCNC: 4.1 MMOL/L (ref 3.4–5.3)
RBC # BLD AUTO: 4.74 10E6/UL (ref 3.8–5.2)
SODIUM SERPL-SCNC: 142 MMOL/L (ref 136–145)
WBC # BLD AUTO: 9.4 10E3/UL (ref 4–11)

## 2023-09-25 PROCEDURE — 82310 ASSAY OF CALCIUM: CPT | Performed by: NURSE PRACTITIONER

## 2023-09-25 PROCEDURE — 85018 HEMOGLOBIN: CPT | Performed by: NURSE PRACTITIONER

## 2023-09-25 PROCEDURE — 74174 CTA ABD&PLVS W/CONTRAST: CPT | Mod: 26 | Performed by: INTERNAL MEDICINE

## 2023-09-25 PROCEDURE — 36415 COLL VENOUS BLD VENIPUNCTURE: CPT | Performed by: NURSE PRACTITIONER

## 2023-09-25 PROCEDURE — 250N000011 HC RX IP 250 OP 636: Performed by: NURSE PRACTITIONER

## 2023-09-25 PROCEDURE — 71275 CT ANGIOGRAPHY CHEST: CPT | Mod: 26 | Performed by: INTERNAL MEDICINE

## 2023-09-25 PROCEDURE — 74174 CTA ABD&PLVS W/CONTRAST: CPT

## 2023-09-25 RX ORDER — IOPAMIDOL 755 MG/ML
120 INJECTION, SOLUTION INTRAVASCULAR ONCE
Status: COMPLETED | OUTPATIENT
Start: 2023-09-25 | End: 2023-09-25

## 2023-09-25 RX ADMIN — IOPAMIDOL 120 ML: 755 INJECTION, SOLUTION INTRAVENOUS at 13:22

## 2023-09-25 NOTE — PROGRESS NOTES
[facility] HEART CARE  CARDIOVASCULAR DIVISION    VALVE CLINIC INITIAL CONSULTATION    PRIMARY CARDIOLOGIST: Dr. Orellana      PERTINENT CLINICAL HISTORY:     Jenna Kaur is a very pleasant 77 year old female with severe aortic valvular stenosis referred to our clinic for evaluation and consideration for potential transcatheter aortic valve replacement (TAVR).  her severe aortic stenosis is characterized with an area of 0.52 cm2, mean gradient 68 mmHg and peak velocity of 5.3 m/sec with LVEF 55-60% by echocardiogram on 9/20/2023.  Additional notable medical history of CKD, HTN, HLD, hypothyroidism.    In 2017 had an echocardiogram which showed mild aortic stenosis with a mean gradient of 17 mmHg.  Then patient had a repeat echocardiogram and this showed moderate to severe aortic stenosis. His most recent in 2023 TTE showed critical aortic stenosis. Peak aortic velocity is 5.3 m/s mean gradient 68 mmHg, calculated valve area of 1.5 and DI of 0.16.     Currently patient joined a senior citizen exercise group.  He does admit to some shortness of breath while doing the exercise activity.    No chest pain or exertional dizziness. No lightheadedness, or dizziness.     Patient had questions answered. We had a phone call with her niece to describe our procedure.        PAST MEDICAL HISTORY:     Past Medical History:   Diagnosis Date    Heart disease     Hypertension         PAST SURGICAL HISTORY:     Past Surgical History:   Procedure Laterality Date    ABDOMEN SURGERY      CHOLECYSTECTOMY      COLONOSCOPY      EYE SURGERY  Cataract surgery 11/2 & 11/16/2021        CURRENT MEDICATIONS:     Current Outpatient Medications   Medication Sig Dispense Refill    aspirin 81 MG EC tablet Take 81 mg by mouth daily      Calcium Carbonate-Vitamin D (CALCIUM 500 + D PO) Take  by mouth daily. Takes 2 tablets      carvedilol (COREG) 25 MG tablet Take 1 tablet (25 mg) by mouth 2 times daily (with meals) 180 tablet 3    levothyroxine  (SYNTHROID/LEVOTHROID) 112 MCG tablet Take 1 tablet (112 mcg) by mouth daily 90 tablet 3    losartan (COZAAR) 50 MG tablet Take 1 tablet (50 mg) by mouth daily 90 tablet 3    polyethylene glycol (MIRALAX/GLYCOLAX) powder as needed      simvastatin (ZOCOR) 10 MG tablet Take 1 tablet (10 mg) by mouth At Bedtime 90 tablet 3        ALLERGIES:     Allergies   Allergen Reactions    Erythromycin GI Disturbance     Nausea and sick feeling        FAMILY HISTORY:     Family History   Problem Relation Age of Onset    Breast Cancer Mother 70    Colon Cancer Father 59    Arrhythmia Brother     Sleep Apnea Brother         SOCIAL HISTORY:     Social History     Socioeconomic History    Marital status:    Tobacco Use    Smoking status: Never    Smokeless tobacco: Never   Vaping Use    Vaping Use: Never used   Substance and Sexual Activity    Alcohol use: Never    Drug use: Never    Sexual activity: Never   Other Topics Concern    Parent/sibling w/ CABG, MI or angioplasty before 65F 55M? No        REVIEW OF SYSTEMS:     Constitutional: No fevers or chills  Skin: No new rash or itching  Eyes: No acute change in vision  Ears/Nose/Throat: No purulent rhinorrhea, new hearing loss, or new vertigo  Respiratory: No cough or hemoptysis  Cardiovascular: See HPI  Gastrointestinal: No change in appetite, vomiting, hematemesis or diarrhea  Genitourinary: No dysuria or hematuria  Musculoskeletal: No new back pain, neck pain or muscle pain  Neurologic: No new headaches, focal weakness or behavior changes  Psychiatric: No hallucinations, excessive alcohol consumption or illegal drug usage  Hematologic/Lymphatic/Immunologic: No bleeding, chills, fever, night sweats or weight loss  Endocrine: No new cold intolerance, heat intolerance, polyphagia, polydipsia or polyuria      PHYSICAL EXAMINATION:     /84 (BP Location: Left arm, Patient Position: Chair, Cuff Size: Adult Large)   Pulse 70   Wt 89.7 kg (197 lb 11.2 oz)   SpO2 95%   BMI  33.06 kg/m      GENERAL: No acute distress.  HEENT: EOMI. Sclerae white, not injected. Nares clear. Pharynx without erythema or exudate.   Neck: No adenopathy. No thyromegaly. Symmetrical.   Heart: Regular rate and rhythm. Harsh crescendo decrescendo late peaking systolic murmur with radiation to carotids. Delayed carotid pulses.   Lungs: Clear to auscultation. No ronchi, wheezes, rales.   Abdomen: Soft, nontender, nondistended. Bowel sounds present.  Extremities: No clubbing, cyanosis, or edema.   Neurologic: Alert and oriented to person/place/time, normal speech and affect. No focal deficits.  Skin: No petechiae, purpura or rash.     LABORATORY DATA:     LIPID RESULTS:  Lab Results   Component Value Date    CHOL 153 02/22/2023    CHOL 162 12/11/2020    HDL 69 02/22/2023    HDL 65 12/11/2020    LDL 64 02/22/2023    LDL 76 12/11/2020    TRIG 102 02/22/2023    TRIG 106 12/11/2020       LIVER ENZYME RESULTS:  No results found for: AST, ALT    CBC RESULTS:  Lab Results   Component Value Date    WBC 9.4 09/25/2023    WBC 7.1 11/01/2018    RBC 4.74 09/25/2023    RBC 4.61 11/01/2018    HGB 14.9 09/25/2023    HGB 14.3 11/01/2018    HCT 42.6 09/25/2023    HCT 42.5 11/01/2018    MCV 90 09/25/2023    MCV 92 11/01/2018    MCH 31.4 09/25/2023    MCH 31.0 11/01/2018    MCHC 35.0 09/25/2023    MCHC 33.6 11/01/2018    RDW 11.9 09/25/2023    RDW 12.2 11/01/2018     09/25/2023     11/01/2018       BMP RESULTS:  Lab Results   Component Value Date     09/25/2023     02/10/2021    POTASSIUM 4.1 09/25/2023    POTASSIUM 4.1 02/22/2023    POTASSIUM 4.5 02/10/2021    CHLORIDE 106 09/25/2023    CHLORIDE 111 (H) 02/22/2023    CHLORIDE 105 02/10/2021    CO2 24 09/25/2023    CO2 28 02/22/2023    CO2 31 02/10/2021    ANIONGAP 12 09/25/2023    ANIONGAP 5 02/22/2023    ANIONGAP 3 02/10/2021     (H) 09/25/2023     (H) 02/22/2023     (H) 02/10/2021    BUN 15.5 09/25/2023    BUN 25 02/22/2023    BUN 23  02/10/2021    CR 0.87 2023    CR 1.00 02/10/2021    GFRESTIMATED 67 2023    GFRESTIMATED 54 (L) 02/10/2021    GFRESTBLACK 63 02/10/2021    JOSE 9.9 2023    OJSE 9.1 02/10/2021        A1C RESULTS:  Lab Results   Component Value Date    A1C 5.2 2022    A1C 5.4 2018       INR RESULTS:  No results found for: INR       PROCEDURES & FURTHER ASSESSMENTS:     EC2023    Sinus rhythm, RBBB      Echocardiogram:  2023    Very severe aortic stenosis (PV 5.4 m/s MG 68 mmHg DVI 0.15 CL 0.52 cm2 SVI  37 mL/m2.)   Left and right ventricular function is normal.The LV ejection fraction is 55-  60%. No regional wall motion abnormalities are seen.  This study was compared with the study from 8/15/22: Aortic stenosis has  worsened significantly and is now very severe.    CT TAVR: 2023  1.  Bicuspid aortic valve with fusion of the left and right coronary  cusp, Sue type 1. The aortic valve calcium score is 3067.  Planimeter valve area is 1.2 sq cm, consistent with severe stenosis.  Though the aortic cusps are severely calcified, the  raphe is minimally calcified. The LVOT is not calcified. The ascending  aorta is not calcified, aortic arch is  mildly calcified, the  descending thoracic aorta is mildly calcified. There is no mitral  annular calcification.  1.  There are minimal adverse aortic root features, ie coronary  heights are adequate, there is minimal aortic root calcification, and  raphe is minimally calcified.  2.  There are severe native coronary calcifications.   3.  The arch vessel branching pattern is normal.   4.  The suprarenal abdominal aorta is mildly calcified; infrarenal  abdominal aorta is moderately calcified  5.  Widely patent origins of celiac trunk, superior mesenteric artery  and inferior mesenteric artery.  Single bilateral renal arteries with  widely patent origins.   7.  Femoral artery access site: no significant tortuosity or  calcification.   8. There is no  acute aortic pathology, such as dissection, intramural  hematoma, or contained rupture.      MEASUREMENTS:   Representative dimensions of the thoracoabdominal aorta are as  follows:     1. AORTIC ANNULUS MEASUREMENTS:     1.  The average aortic annulus diameter is 29.3 mm, (long diameter is  23.4 mm and short diameter is 23.4 mm)  2.  Aortic annulus area is 5.41 cm2  3.  Aortic annulus perimeter is 84.2 mm  4.  Suggested 3-cusp coaxial angle for aortic valve is (Swiss 16 , CAU  9) and alternate A-P coaxial angle is (BARRIOS 0 , CAU 37), these angles  will vary depending upon the patient's body position  5.  Aortic root angle is 60.7 degrees  6.  Left main - Annulus distance: 12.2 mm, RCA - Annulus distance: 18  mm     2. LVOT MEASUREMENTS:     1.  The average LVOT diameter (measured 4 mm below annular plane) is  26.4 mm  2.  LVOT area is 5.46 cm2  3.  LVOT perimeter is 84.5 mm     3. AORTA MEASUREMENTS     1.  The aortic root at the sinuses of Valsalva: 31.3 mm x  27.2 mm x  34.6 mm  2.  The sinotubular junction:  32.2 mm x 31.1 mm  3.  Sinotubular junction height: 17 mm x 16 mm  4.  Proximal ascending aorta: 37.5 mm x 35.7 mm  5.  Distal abdominal aorta proximal to the bifurcation: 15.8 mm x 15.5  mm  6.  Innominate artery 3 cm from ostium: 11.1 mm x 9.9 mm  7.  Left common carotid artery 3 cm from ostium: 6.9 mm x 6.5 mm     4. ILIOFEMORAL MEASUREMENTS:     RIGHT:  1.  Right common iliac artery: 10.6 mm x 9.3 mm   2.  Right external iliac artery: 8.9 mm x 7.7 mm   3.  Right common femoral artery: 9.3 mm x 8.7 mm   4.  Tortuosity: mild   5.  Calcification: mild      LEFT:  1.  Left common iliac artery: 9.9 mm x  8.4 mm  2.  Left external iliac artery: 8.0 mm x 7.3 mm  3.  Left common femoral artery: 9.2 mm x 7.9 mm  4.  Tortuosity: mild   5.  Calcification: mild      OTHER FINDINGS:   1.  Please review the separate Radiology report for incidental  noncardiac findings.     Coronary Angiogram and Right Heart  Catheterization: pending       STS RISK SCORE: 2.5%  FRAILTY SCORE: Pending  NYHA CLASS: III     CLINICAL IMPRESSION:     Jenna Kaur is a 77 year old female with symptomatic severe aortic stenosis who is a good candidate for transcatheter aortic valve replacement based on age, frailty, co-morbidities and overall surgical mortality risk estimation of 2.5% based on the STS score.      The pre-procedural TAVR evaluation currently requires additional assessment  prior to presentation to the Heart team for discussion during our multi-disciplinary conference for consensus decision and procedural planning.    Plan Summary:  1) Severe Aortic Stenosis:  - Coronary angiogram   - Presentation of data to the Heart team to assess the optimal treatment of his severe aortic stenosis    Patient was evaluated in clinic with Dr. Leyva of interventional cardiology       Andres Miguel MD  Structural Heart Disease &   Advanced Interventional Cardiology Fellow  [phone]    CC  Patient Care Team:  Kenna Garcia PA-C as PCP - General (Physician Assistant)  Kenna Garcia PA-C as Assigned PCP  BELLO Haas MD as MD (Cardiovascular Disease)  BELLO Haas MD as Assigned Heart and Vascular Provider    Patient seen and examined with Cardiovascular fellow and agree with the assessment and plan described above.     Colton Leyva M.D.  Interventional Cardiology  [facility]

## 2023-09-27 ENCOUNTER — OFFICE VISIT (OUTPATIENT)
Dept: CARDIOLOGY | Facility: CLINIC | Age: 78
End: 2023-09-27
Attending: INTERNAL MEDICINE
Payer: COMMERCIAL

## 2023-09-27 VITALS
SYSTOLIC BLOOD PRESSURE: 131 MMHG | HEART RATE: 70 BPM | DIASTOLIC BLOOD PRESSURE: 84 MMHG | OXYGEN SATURATION: 95 % | BODY MASS INDEX: 33.06 KG/M2 | WEIGHT: 197.7 LBS

## 2023-09-27 DIAGNOSIS — I35.0 NONRHEUMATIC AORTIC VALVE STENOSIS: Primary | ICD-10-CM

## 2023-09-27 DIAGNOSIS — I25.10 CORONARY ARTERY DISEASE INVOLVING NATIVE HEART, UNSPECIFIED VESSEL OR LESION TYPE, UNSPECIFIED WHETHER ANGINA PRESENT: ICD-10-CM

## 2023-09-27 LAB
ATRIAL RATE - MUSE: 69 BPM
DIASTOLIC BLOOD PRESSURE - MUSE: NORMAL MMHG
INTERPRETATION ECG - MUSE: NORMAL
P AXIS - MUSE: 60 DEGREES
PR INTERVAL - MUSE: 168 MS
QRS DURATION - MUSE: 130 MS
QT - MUSE: 458 MS
QTC - MUSE: 490 MS
R AXIS - MUSE: -62 DEGREES
SYSTOLIC BLOOD PRESSURE - MUSE: NORMAL MMHG
T AXIS - MUSE: 1 DEGREES
VENTRICULAR RATE- MUSE: 69 BPM

## 2023-09-27 PROCEDURE — 93010 ELECTROCARDIOGRAM REPORT: CPT | Performed by: INTERNAL MEDICINE

## 2023-09-27 PROCEDURE — G0463 HOSPITAL OUTPT CLINIC VISIT: HCPCS | Performed by: INTERNAL MEDICINE

## 2023-09-27 PROCEDURE — 99215 OFFICE O/P EST HI 40 MIN: CPT | Mod: GC | Performed by: INTERNAL MEDICINE

## 2023-09-27 PROCEDURE — 93005 ELECTROCARDIOGRAM TRACING: CPT

## 2023-09-27 RX ORDER — ASPIRIN 325 MG
325 TABLET ORAL ONCE
Status: CANCELLED | OUTPATIENT
Start: 2023-09-27 | End: 2023-09-27

## 2023-09-27 RX ORDER — POTASSIUM CHLORIDE 1500 MG/1
20 TABLET, EXTENDED RELEASE ORAL
Status: CANCELLED | OUTPATIENT
Start: 2023-09-27

## 2023-09-27 RX ORDER — POTASSIUM CHLORIDE 1500 MG/1
40 TABLET, EXTENDED RELEASE ORAL
Status: CANCELLED | OUTPATIENT
Start: 2023-09-27

## 2023-09-27 RX ORDER — SODIUM CHLORIDE 9 MG/ML
INJECTION, SOLUTION INTRAVENOUS CONTINUOUS
Status: CANCELLED | OUTPATIENT
Start: 2023-09-27

## 2023-09-27 RX ORDER — LIDOCAINE 40 MG/G
CREAM TOPICAL
Status: CANCELLED | OUTPATIENT
Start: 2023-09-27

## 2023-09-27 RX ORDER — ASPIRIN 81 MG/1
243 TABLET, CHEWABLE ORAL ONCE
Status: CANCELLED | OUTPATIENT
Start: 2023-09-27

## 2023-09-27 ASSESSMENT — PAIN SCALES - GENERAL: PAINLEVEL: NO PAIN (0)

## 2023-09-27 NOTE — PROGRESS NOTES
Date: 9/27/2023    Time of Call: 8:38 AM     Diagnosis:  Severe aortic stenosis, possible coronary artery disease     [ TORB ] Ordering provider: Colton Leyva MD  Order:   Coronary angiogram with possible PCI     Order received by: Haily Dougherty RN     Follow-up/additional notes:   Ordered as part of a TAVR work up.

## 2023-09-27 NOTE — PROGRESS NOTES
Structural Heart Coordinator visit:  Provided additional education regarding TAVR/MitraClip procedure, after being present for discussion with physician. Explained the work-up process and next steps for patient. Patient provided our direct contact number and instructed to call with any questions.     Frailty Score: 1/4    Completed frailty testing and KCCQ.       5 meter walk: 5.24  Trial 1:57.4  Trail 2: 5.26  Trial 3: 4.74    : 55.56  Albumin: None in chart  Eye ball test: Pass  ADL: 6/6    Dental Clearance: Pass      KCCQ Results:   1a. 5  1b. 5  1c. 4  2. 5  3. 7  4. 7  5. 5  6. 5  7. 5  8a. 5  8b. 5  8c. 5          Deneen Rios CMA  Structural Heart   [facility]

## 2023-09-27 NOTE — PATIENT INSTRUCTIONS
October 2, 2023.  Check in at 8:00 a.m.  Pre-procedure instructions - Coronary Angiogram  Patient Education    Your arrival time is 8:00 a.m.  Location is [address] Waiting Room  Please plan on being at the hospital all day.  At any time, emergencies and/or urgent cases may come up which could delay the start of your procedure.    Pre-procedure instructions - Coronary Angiogram  Shower in the evening before or the morning of the procedure  No solid food for 8 hours prior and nothing to drink 2 hours prior to arrival time  You can take your morning medications (except for diabetic and blood thinners) with sips of water.  Take 325 mg of Asprin 24 hours prior to the procedure and the morning of procedure.   You will need to arrange a ride to drop you off and pick you up, as you will be unable to drive home.  Prior to discharge you may be required to lay flat for approximately 2-4 hours in the recovery unit to ensure proper clotting of the artery.         You will need to follow up with one of our cardiology APPs 1-2 weeks after your procedure. If you need help scheduling or rescheduling your appointment, please call [phone]

## 2023-09-27 NOTE — NURSING NOTE
Chief Complaint   Patient presents with    New Patient     78 yo FM with critical AORTIC STENOSIS, here for evaluation of possible aortic valve procedure       Vitals were taken, medications reconciled, and EKG was performed.    Mikal Knox, EMT  9:06 AM

## 2023-09-27 NOTE — LETTER
9/27/2023      RE: Jenna Kaur  [address] [place] [address]       Dear Colleague,    Thank you for the opportunity to participate in the care of your patient, Jenna Kaur, at the [facility] HEART [facility]IN[facility] [place] [facility]. Please see a copy of my visit note below.        [facility] HEART CARE  CARDIOVASCULAR DIVISION    VALVE CLINIC INITIAL CONSULTATION    PRIMARY CARDIOLOGIST: Dr. Orellana      PERTINENT CLINICAL HISTORY:     Jenna Kaur is a very pleasant 77 year old female with severe aortic valvular stenosis referred to our clinic for evaluation and consideration for potential transcatheter aortic valve replacement (TAVR).  her severe aortic stenosis is characterized with an area of 0.52 cm2, mean gradient 68 mmHg and peak velocity of 5.3 m/sec with LVEF 55-60% by echocardiogram on 9/20/2023.  Additional notable medical history of CKD, HTN, HLD, hypothyroidism.    In 2017 had an echocardiogram which showed mild aortic stenosis with a mean gradient of 17 mmHg.  Then patient had a repeat echocardiogram and this showed moderate to severe aortic stenosis. His most recent in 2023 TTE showed critical aortic stenosis. Peak aortic velocity is 5.3 m/s mean gradient 68 mmHg, calculated valve area of 1.5 and DI of 0.16.     Currently patient joined a senior citizen exercise group.  He does admit to some shortness of breath while doing the exercise activity.    No chest pain or exertional dizziness. No lightheadedness, or dizziness.     Patient had questions answered. We had a phone call with her niece to describe our procedure.        PAST MEDICAL HISTORY:     Past Medical History:   Diagnosis Date    Heart disease     Hypertension         PAST SURGICAL HISTORY:     Past Surgical History:   Procedure Laterality Date    ABDOMEN SURGERY      CHOLECYSTECTOMY      COLONOSCOPY      EYE SURGERY  Cataract surgery 11/2 & 11/16/2021        CURRENT  MEDICATIONS:     Current Outpatient Medications   Medication Sig Dispense Refill    aspirin 81 MG EC tablet Take 81 mg by mouth daily      Calcium Carbonate-Vitamin D (CALCIUM 500 + D PO) Take  by mouth daily. Takes 2 tablets      carvedilol (COREG) 25 MG tablet Take 1 tablet (25 mg) by mouth 2 times daily (with meals) 180 tablet 3    levothyroxine (SYNTHROID/LEVOTHROID) 112 MCG tablet Take 1 tablet (112 mcg) by mouth daily 90 tablet 3    losartan (COZAAR) 50 MG tablet Take 1 tablet (50 mg) by mouth daily 90 tablet 3    polyethylene glycol (MIRALAX/GLYCOLAX) powder as needed      simvastatin (ZOCOR) 10 MG tablet Take 1 tablet (10 mg) by mouth At Bedtime 90 tablet 3        ALLERGIES:     Allergies   Allergen Reactions    Erythromycin GI Disturbance     Nausea and sick feeling        FAMILY HISTORY:     Family History   Problem Relation Age of Onset    Breast Cancer Mother 70    Colon Cancer Father 59    Arrhythmia Brother     Sleep Apnea Brother         SOCIAL HISTORY:     Social History     Socioeconomic History    Marital status:    Tobacco Use    Smoking status: Never    Smokeless tobacco: Never   Vaping Use    Vaping Use: Never used   Substance and Sexual Activity    Alcohol use: Never    Drug use: Never    Sexual activity: Never   Other Topics Concern    Parent/sibling w/ CABG, MI or angioplasty before 65F 55M? No        REVIEW OF SYSTEMS:     Constitutional: No fevers or chills  Skin: No new rash or itching  Eyes: No acute change in vision  Ears/Nose/Throat: No purulent rhinorrhea, new hearing loss, or new vertigo  Respiratory: No cough or hemoptysis  Cardiovascular: See HPI  Gastrointestinal: No change in appetite, vomiting, hematemesis or diarrhea  Genitourinary: No dysuria or hematuria  Musculoskeletal: No new back pain, neck pain or muscle pain  Neurologic: No new headaches, focal weakness or behavior changes  Psychiatric: No hallucinations, excessive alcohol consumption or illegal drug  usage  Hematologic/Lymphatic/Immunologic: No bleeding, chills, fever, night sweats or weight loss  Endocrine: No new cold intolerance, heat intolerance, polyphagia, polydipsia or polyuria      PHYSICAL EXAMINATION:     /84 (BP Location: Left arm, Patient Position: Chair, Cuff Size: Adult Large)   Pulse 70   Wt 89.7 kg (197 lb 11.2 oz)   SpO2 95%   BMI 33.06 kg/m      GENERAL: No acute distress.  HEENT: EOMI. Sclerae white, not injected. Nares clear. Pharynx without erythema or exudate.   Neck: No adenopathy. No thyromegaly. Symmetrical.   Heart: Regular rate and rhythm. Harsh crescendo decrescendo late peaking systolic murmur with radiation to carotids. Delayed carotid pulses.   Lungs: Clear to auscultation. No ronchi, wheezes, rales.   Abdomen: Soft, nontender, nondistended. Bowel sounds present.  Extremities: No clubbing, cyanosis, or edema.   Neurologic: Alert and oriented to person/place/time, normal speech and affect. No focal deficits.  Skin: No petechiae, purpura or rash.     LABORATORY DATA:     LIPID RESULTS:  Lab Results   Component Value Date    CHOL 153 02/22/2023    CHOL 162 12/11/2020    HDL 69 02/22/2023    HDL 65 12/11/2020    LDL 64 02/22/2023    LDL 76 12/11/2020    TRIG 102 02/22/2023    TRIG 106 12/11/2020       LIVER ENZYME RESULTS:  No results found for: AST, ALT    CBC RESULTS:  Lab Results   Component Value Date    WBC 9.4 09/25/2023    WBC 7.1 11/01/2018    RBC 4.74 09/25/2023    RBC 4.61 11/01/2018    HGB 14.9 09/25/2023    HGB 14.3 11/01/2018    HCT 42.6 09/25/2023    HCT 42.5 11/01/2018    MCV 90 09/25/2023    MCV 92 11/01/2018    MCH 31.4 09/25/2023    MCH 31.0 11/01/2018    MCHC 35.0 09/25/2023    MCHC 33.6 11/01/2018    RDW 11.9 09/25/2023    RDW 12.2 11/01/2018     09/25/2023     11/01/2018       BMP RESULTS:  Lab Results   Component Value Date     09/25/2023     02/10/2021    POTASSIUM 4.1 09/25/2023    POTASSIUM 4.1 02/22/2023    POTASSIUM 4.5  02/10/2021    CHLORIDE 106 2023    CHLORIDE 111 (H) 2023    CHLORIDE 105 02/10/2021    CO2 24 2023    CO2 28 2023    CO2 31 02/10/2021    ANIONGAP 12 2023    ANIONGAP 5 2023    ANIONGAP 3 02/10/2021     (H) 2023     (H) 2023     (H) 02/10/2021    BUN 15.5 2023    BUN 25 2023    BUN 23 02/10/2021    CR 0.87 2023    CR 1.00 02/10/2021    GFRESTIMATED 67 2023    GFRESTIMATED 54 (L) 02/10/2021    GFRESTBLACK 63 02/10/2021    JOSE 9.9 2023    JOSE 9.1 02/10/2021        A1C RESULTS:  Lab Results   Component Value Date    A1C 5.2 2022    A1C 5.4 2018       INR RESULTS:  No results found for: INR       PROCEDURES & FURTHER ASSESSMENTS:     EC2023    Sinus rhythm, RBBB      Echocardiogram:  2023    Very severe aortic stenosis (PV 5.4 m/s MG 68 mmHg DVI 0.15 CL 0.52 cm2 SVI  37 mL/m2.)   Left and right ventricular function is normal.The LV ejection fraction is 55-  60%. No regional wall motion abnormalities are seen.  This study was compared with the study from 8/15/22: Aortic stenosis has  worsened significantly and is now very severe.    CT TAVR: 2023  1.  Bicuspid aortic valve with fusion of the left and right coronary  cusp, Sue type 1. The aortic valve calcium score is 3067.  Planimeter valve area is 1.2 sq cm, consistent with severe stenosis.  Though the aortic cusps are severely calcified, the  raphe is minimally calcified. The LVOT is not calcified. The ascending  aorta is not calcified, aortic arch is  mildly calcified, the  descending thoracic aorta is mildly calcified. There is no mitral  annular calcification.  1.  There are minimal adverse aortic root features, ie coronary  heights are adequate, there is minimal aortic root calcification, and  raphe is minimally calcified.  2.  There are severe native coronary calcifications.   3.  The arch vessel branching pattern is normal.   4.   The suprarenal abdominal aorta is mildly calcified; infrarenal  abdominal aorta is moderately calcified  5.  Widely patent origins of celiac trunk, superior mesenteric artery  and inferior mesenteric artery.  Single bilateral renal arteries with  widely patent origins.   7.  Femoral artery access site: no significant tortuosity or  calcification.   8. There is no acute aortic pathology, such as dissection, intramural  hematoma, or contained rupture.      MEASUREMENTS:   Representative dimensions of the thoracoabdominal aorta are as  follows:     1. AORTIC ANNULUS MEASUREMENTS:     1.  The average aortic annulus diameter is 29.3 mm, (long diameter is  23.4 mm and short diameter is 23.4 mm)  2.  Aortic annulus area is 5.41 cm2  3.  Aortic annulus perimeter is 84.2 mm  4.  Suggested 3-cusp coaxial angle for aortic valve is (Bulgarian 16 , CAU  9) and alternate A-P coaxial angle is (BARRIOS 0 , CAU 37), these angles  will vary depending upon the patient's body position  5.  Aortic root angle is 60.7 degrees  6.  Left main - Annulus distance: 12.2 mm, RCA - Annulus distance: 18  mm     2. LVOT MEASUREMENTS:     1.  The average LVOT diameter (measured 4 mm below annular plane) is  26.4 mm  2.  LVOT area is 5.46 cm2  3.  LVOT perimeter is 84.5 mm     3. AORTA MEASUREMENTS     1.  The aortic root at the sinuses of Valsalva: 31.3 mm x  27.2 mm x  34.6 mm  2.  The sinotubular junction:  32.2 mm x 31.1 mm  3.  Sinotubular junction height: 17 mm x 16 mm  4.  Proximal ascending aorta: 37.5 mm x 35.7 mm  5.  Distal abdominal aorta proximal to the bifurcation: 15.8 mm x 15.5  mm  6.  Innominate artery 3 cm from ostium: 11.1 mm x 9.9 mm  7.  Left common carotid artery 3 cm from ostium: 6.9 mm x 6.5 mm     4. ILIOFEMORAL MEASUREMENTS:     RIGHT:  1.  Right common iliac artery: 10.6 mm x 9.3 mm   2.  Right external iliac artery: 8.9 mm x 7.7 mm   3.  Right common femoral artery: 9.3 mm x 8.7 mm   4.  Tortuosity: mild   5.  Calcification: mild       LEFT:  1.  Left common iliac artery: 9.9 mm x  8.4 mm  2.  Left external iliac artery: 8.0 mm x 7.3 mm  3.  Left common femoral artery: 9.2 mm x 7.9 mm  4.  Tortuosity: mild   5.  Calcification: mild      OTHER FINDINGS:   1.  Please review the separate Radiology report for incidental  noncardiac findings.     Coronary Angiogram and Right Heart Catheterization: pending       STS RISK SCORE: 2.5%  FRAILTY SCORE: Pending  NYHA CLASS: III     CLINICAL IMPRESSION:     Jenna Kaur is a 77 year old female with symptomatic severe aortic stenosis who is a good candidate for transcatheter aortic valve replacement based on age, frailty, co-morbidities and overall surgical mortality risk estimation of 2.5% based on the STS score.      The pre-procedural TAVR evaluation currently requires additional assessment  prior to presentation to the Heart team for discussion during our multi-disciplinary conference for consensus decision and procedural planning.    Plan Summary:  1) Severe Aortic Stenosis:  - Coronary angiogram   - Presentation of data to the Heart team to assess the optimal treatment of his severe aortic stenosis    Patient was evaluated in clinic with Dr. Leyva of interventional cardiology       Andres Miguel MD  Structural Heart Disease &   Advanced Interventional Cardiology Fellow  [phone]    CC  Patient Care Team:  Kenna Garcia PA-C as PCP - General (Physician Assistant)  Kenna Garcia PA-C as Assigned PCP  BELLO Haas MD as MD (Cardiovascular Disease)  BELLO Haas MD as Assigned Heart and Vascular Provider    Patient seen and examined with Cardiovascular fellow and agree with the assessment and plan described above.     Colton Leyva M.D.  Interventional Cardiology  [facility]           Structural Heart Coordinator visit:  Provided additional education regarding TAVR/MitraClip procedure, after being present for discussion with physician.  Explained the work-up process and next steps for patient. Patient provided our direct contact number and instructed to call with any questions.     Frailty Score: 1/4    Completed frailty testing and KCCQ.       5 meter walk: 5.24  Trial 1:57.4  Trail 2: 5.26  Trial 3: 4.74    : 55.56  Albumin: None in chart  Eye ball test: Pass  ADL: 6/6    Dental Clearance: Pass      KCCQ Results:   1a. 5  1b. 5  1c. 4  2. 5  3. 7  4. 7  5. 5  6. 5  7. 5  8a. 5  8b. 5  8c. 5        Please do not hesitate to contact me if you have any questions/concerns.     Sincerely,     Colton Leyva MD

## 2023-10-02 ENCOUNTER — HOSPITAL ENCOUNTER (OUTPATIENT)
Facility: CLINIC | Age: 78
Discharge: HOME | End: 2023-10-02
Attending: INTERNAL MEDICINE | Admitting: INTERNAL MEDICINE
Payer: COMMERCIAL

## 2023-10-02 ENCOUNTER — APPOINTMENT (OUTPATIENT)
Dept: MEDSURG UNIT | Facility: CLINIC | Age: 78
End: 2023-10-02
Attending: INTERNAL MEDICINE
Payer: COMMERCIAL

## 2023-10-02 ENCOUNTER — APPOINTMENT (OUTPATIENT)
Dept: LAB | Facility: CLINIC | Age: 78
End: 2023-10-02
Attending: INTERNAL MEDICINE
Payer: COMMERCIAL

## 2023-10-02 VITALS
HEART RATE: 67 BPM | SYSTOLIC BLOOD PRESSURE: 137 MMHG | BODY MASS INDEX: 32.47 KG/M2 | RESPIRATION RATE: 16 BRPM | HEIGHT: 65 IN | OXYGEN SATURATION: 97 % | TEMPERATURE: 97.8 F | WEIGHT: 194.89 LBS | DIASTOLIC BLOOD PRESSURE: 76 MMHG

## 2023-10-02 DIAGNOSIS — I25.10 CORONARY ARTERY DISEASE INVOLVING NATIVE HEART, UNSPECIFIED VESSEL OR LESION TYPE, UNSPECIFIED WHETHER ANGINA PRESENT: ICD-10-CM

## 2023-10-02 DIAGNOSIS — I35.0 NONRHEUMATIC AORTIC VALVE STENOSIS: ICD-10-CM

## 2023-10-02 LAB
ANION GAP SERPL CALCULATED.3IONS-SCNC: 12 MMOL/L (ref 7–15)
BUN SERPL-MCNC: 18.5 MG/DL (ref 8–23)
CALCIUM SERPL-MCNC: 10 MG/DL (ref 8.8–10.2)
CHLORIDE SERPL-SCNC: 105 MMOL/L (ref 98–107)
CREAT SERPL-MCNC: 1.04 MG/DL (ref 0.51–0.95)
DEPRECATED HCO3 PLAS-SCNC: 26 MMOL/L (ref 22–29)
EGFRCR SERPLBLD CKD-EPI 2021: 54 ML/MIN/1.73M2
ERYTHROCYTE [DISTWIDTH] IN BLOOD BY AUTOMATED COUNT: 12 % (ref 10–15)
GLUCOSE SERPL-MCNC: 132 MG/DL (ref 70–99)
HCT VFR BLD AUTO: 42.7 % (ref 35–47)
HGB BLD-MCNC: 14 G/DL (ref 11.7–15.7)
HGB BLD-MCNC: 14.8 G/DL (ref 11.7–15.7)
INR PPP: 1.15 (ref 0.85–1.15)
MCH RBC QN AUTO: 32 PG (ref 26.5–33)
MCHC RBC AUTO-ENTMCNC: 34.7 G/DL (ref 31.5–36.5)
MCV RBC AUTO: 92 FL (ref 78–100)
OXYHGB MFR BLDV: 65 % (ref 92–100)
PLATELET # BLD AUTO: 192 10E3/UL (ref 150–450)
POTASSIUM SERPL-SCNC: 5.3 MMOL/L (ref 3.4–5.3)
RBC # BLD AUTO: 4.62 10E6/UL (ref 3.8–5.2)
SODIUM SERPL-SCNC: 143 MMOL/L (ref 135–145)
WBC # BLD AUTO: 8.4 10E3/UL (ref 4–11)

## 2023-10-02 PROCEDURE — C1751 CATH, INF, PER/CENT/MIDLINE: HCPCS | Performed by: INTERNAL MEDICINE

## 2023-10-02 PROCEDURE — 85610 PROTHROMBIN TIME: CPT | Performed by: INTERNAL MEDICINE

## 2023-10-02 PROCEDURE — 85027 COMPLETE CBC AUTOMATED: CPT | Performed by: INTERNAL MEDICINE

## 2023-10-02 PROCEDURE — 93456 R HRT CORONARY ARTERY ANGIO: CPT | Mod: 26 | Performed by: INTERNAL MEDICINE

## 2023-10-02 PROCEDURE — 258N000003 HC RX IP 258 OP 636: Performed by: INTERNAL MEDICINE

## 2023-10-02 PROCEDURE — 99153 MOD SED SAME PHYS/QHP EA: CPT | Performed by: INTERNAL MEDICINE

## 2023-10-02 PROCEDURE — C1894 INTRO/SHEATH, NON-LASER: HCPCS | Performed by: INTERNAL MEDICINE

## 2023-10-02 PROCEDURE — 999N000142 HC STATISTIC PROCEDURE PREP ONLY

## 2023-10-02 PROCEDURE — 999N000054 HC STATISTIC EKG NON-CHARGEABLE

## 2023-10-02 PROCEDURE — 80048 BASIC METABOLIC PNL TOTAL CA: CPT | Performed by: INTERNAL MEDICINE

## 2023-10-02 PROCEDURE — 36415 COLL VENOUS BLD VENIPUNCTURE: CPT | Performed by: INTERNAL MEDICINE

## 2023-10-02 PROCEDURE — 272N000001 HC OR GENERAL SUPPLY STERILE: Performed by: INTERNAL MEDICINE

## 2023-10-02 PROCEDURE — 82810 BLOOD GASES O2 SAT ONLY: CPT

## 2023-10-02 PROCEDURE — 99152 MOD SED SAME PHYS/QHP 5/>YRS: CPT | Performed by: INTERNAL MEDICINE

## 2023-10-02 PROCEDURE — 250N000009 HC RX 250: Performed by: INTERNAL MEDICINE

## 2023-10-02 PROCEDURE — 93456 R HRT CORONARY ARTERY ANGIO: CPT | Performed by: INTERNAL MEDICINE

## 2023-10-02 PROCEDURE — 85018 HEMOGLOBIN: CPT | Mod: 91

## 2023-10-02 PROCEDURE — 93005 ELECTROCARDIOGRAM TRACING: CPT

## 2023-10-02 PROCEDURE — C1887 CATHETER, GUIDING: HCPCS | Performed by: INTERNAL MEDICINE

## 2023-10-02 PROCEDURE — 99152 MOD SED SAME PHYS/QHP 5/>YRS: CPT | Mod: GC | Performed by: INTERNAL MEDICINE

## 2023-10-02 PROCEDURE — 999N000134 HC STATISTIC PP CARE STAGE 3

## 2023-10-02 PROCEDURE — 250N000011 HC RX IP 250 OP 636: Performed by: INTERNAL MEDICINE

## 2023-10-02 PROCEDURE — 250N000011 HC RX IP 250 OP 636: Mod: JZ | Performed by: INTERNAL MEDICINE

## 2023-10-02 RX ORDER — HEPARIN SODIUM 1000 [USP'U]/ML
INJECTION, SOLUTION INTRAVENOUS; SUBCUTANEOUS
Status: DISCONTINUED | OUTPATIENT
Start: 2023-10-02 | End: 2023-10-02 | Stop reason: HOSPADM

## 2023-10-02 RX ORDER — POTASSIUM CHLORIDE 750 MG/1
20 TABLET, EXTENDED RELEASE ORAL
Status: DISCONTINUED | OUTPATIENT
Start: 2023-10-02 | End: 2023-10-02 | Stop reason: HOSPADM

## 2023-10-02 RX ORDER — OXYCODONE HYDROCHLORIDE 5 MG/1
5 TABLET ORAL EVERY 4 HOURS PRN
Status: DISCONTINUED | OUTPATIENT
Start: 2023-10-02 | End: 2023-10-02 | Stop reason: HOSPADM

## 2023-10-02 RX ORDER — ASPIRIN 325 MG
325 TABLET ORAL ONCE
Status: COMPLETED | OUTPATIENT
Start: 2023-10-02 | End: 2023-10-02

## 2023-10-02 RX ORDER — NALOXONE HYDROCHLORIDE 0.4 MG/ML
0.2 INJECTION, SOLUTION INTRAMUSCULAR; INTRAVENOUS; SUBCUTANEOUS
Status: DISCONTINUED | OUTPATIENT
Start: 2023-10-02 | End: 2023-10-02 | Stop reason: HOSPADM

## 2023-10-02 RX ORDER — NALOXONE HYDROCHLORIDE 0.4 MG/ML
0.4 INJECTION, SOLUTION INTRAMUSCULAR; INTRAVENOUS; SUBCUTANEOUS
Status: DISCONTINUED | OUTPATIENT
Start: 2023-10-02 | End: 2023-10-02 | Stop reason: HOSPADM

## 2023-10-02 RX ORDER — POTASSIUM CHLORIDE 750 MG/1
40 TABLET, EXTENDED RELEASE ORAL
Status: DISCONTINUED | OUTPATIENT
Start: 2023-10-02 | End: 2023-10-02 | Stop reason: HOSPADM

## 2023-10-02 RX ORDER — LIDOCAINE 40 MG/G
CREAM TOPICAL
Status: DISCONTINUED | OUTPATIENT
Start: 2023-10-02 | End: 2023-10-02 | Stop reason: HOSPADM

## 2023-10-02 RX ORDER — FENTANYL CITRATE 50 UG/ML
INJECTION, SOLUTION INTRAMUSCULAR; INTRAVENOUS
Status: DISCONTINUED | OUTPATIENT
Start: 2023-10-02 | End: 2023-10-02 | Stop reason: HOSPADM

## 2023-10-02 RX ORDER — IOPAMIDOL 755 MG/ML
INJECTION, SOLUTION INTRAVASCULAR
Status: DISCONTINUED | OUTPATIENT
Start: 2023-10-02 | End: 2023-10-02 | Stop reason: HOSPADM

## 2023-10-02 RX ORDER — OXYCODONE HYDROCHLORIDE 10 MG/1
10 TABLET ORAL EVERY 4 HOURS PRN
Status: DISCONTINUED | OUTPATIENT
Start: 2023-10-02 | End: 2023-10-02 | Stop reason: HOSPADM

## 2023-10-02 RX ORDER — NICARDIPINE HYDROCHLORIDE 2.5 MG/ML
INJECTION INTRAVENOUS
Status: DISCONTINUED | OUTPATIENT
Start: 2023-10-02 | End: 2023-10-02 | Stop reason: HOSPADM

## 2023-10-02 RX ORDER — ASPIRIN 81 MG/1
243 TABLET, CHEWABLE ORAL ONCE
Status: COMPLETED | OUTPATIENT
Start: 2023-10-02 | End: 2023-10-02

## 2023-10-02 RX ORDER — SODIUM CHLORIDE 9 MG/ML
INJECTION, SOLUTION INTRAVENOUS CONTINUOUS
Status: DISCONTINUED | OUTPATIENT
Start: 2023-10-02 | End: 2023-10-02 | Stop reason: HOSPADM

## 2023-10-02 RX ADMIN — SODIUM CHLORIDE: 9 INJECTION, SOLUTION INTRAVENOUS at 09:49

## 2023-10-02 ASSESSMENT — ACTIVITIES OF DAILY LIVING (ADL)
ADLS_ACUITY_SCORE: 35

## 2023-10-02 NOTE — Clinical Note
100% contrast risk dose: 200  75% contrast risk dose: 150  GFR Estimate       Date                     Value               Ref Range           Status                10/02/2023               54 (L)              >60 mL/min/1.7*     Final                 02/10/2021               54 (L)              >60 mL/min/[1.*     Final              Comment:    Non  GFR Calc  Starting 12/18/2018, serum creatinine based estimated GFR (eG FR) will be   calculated using the Chronic Kidney Disease Epidemiology Collaboration   (CKD-EPI) equation.    ----------

## 2023-10-02 NOTE — PROGRESS NOTES
Pt tolerating po intake, voided and ambulated in beatty with steady gait. Vitals stable. Rhythm: SR. Denies any pain. Right radial and Right internal jugular sites c/d/I with no bleeding or hematoma. CMS intact. Arm sling provided per pt request. Discharge instructions reviewed with pt and anitha Vega and they have no further questions. PIV removed. Pt escorted via wheelchair to niece's car. All belongings returned to pt.

## 2023-10-02 NOTE — DISCHARGE INSTRUCTIONS
Going Home after a Coronary Angiogram, Right Heart Catherization  ______________________________________________      After you go home:  Have an adult stay with you for 24 hours.  Drink plenty of fluids.  You may eat your normal diet, unless your doctor tells you otherwise.  Do Not scrub the procedure site vigorously  No lotion or powder to the puncture site for 3 days  For 24 hours:  Relax and take it easy.  Do NOT smoke.  Do NOT make any important or legal decisions.  Do NOT drive or operate machines at home or at work.  Do NOT drink alcohol.  Remove the Band-Aid after 24 hours. If there is minor oozing, apply another Band-aid and remove it after 12 hours.  For 2 days, do NOT have sex or do any heavy exercise.  Do NOT take a bath, or use a hot tub or pool for at least 3 days. You may shower.    Care of wrist or arm site  It is normal to have soreness at the puncture site and mild tingling in your hand for up to 3 days.  For 2 days, do not use your hand or arm to support your weight (such as rising from a chair) or bend your wrist (such as lifting a garage door).  For 2 days, do not lift more than 5 pounds or exercise your arm (tennis, golf or bowling).    If you start bleeding from the site in your arm:  Sit down and press firmly on the site with your fingers for 10 minutes. Call your doctor as soon as you can.  If the bleeding stops, sit still and keep your wrist straight for 2 hours.    Medicines  If you have stopped any other medicines, check with your nurse or provider about when to restart them.    Call 911 right away if you have bleeding that is heavy or does not stop.    Call your doctor if:  You have a large or growing hard lump around the site.  The site is red, swollen, hot or tender.  Blood or fluid is draining from the site.  You have chills or a fever greater than 101 F (38 C).  Your arm feels numb or cool.  You have hives, a rash or unusual itching.    CALL YOUR PRIMARY PHYSICIAN IF: Monitor neck  site for bleeding, swelling, or voice changes. If you notice bleeding or swelling immediately apply pressure to the site and call number below to speak with Cardiology Fellow.  If you experience any changes in your breathing you should call your doctor immediately or come to the closest Emergency Department.  Do not drive yourself.    Follow Up: Per your primary cardiology team    [facility] [place]:  [phone] (7 days a week)

## 2023-10-02 NOTE — Clinical Note
dry, intact, no bleeding and no hematoma. 7fr sheath pulled from RIJ. Hemostasis achieved. Band aid in place.  6fr sheath pulled from RRA. TR band in place.

## 2023-10-02 NOTE — PROGRESS NOTES
"Prep and teaching complete for Coronary Angiogram; pt awake and alert, denies pain. Has ride post procedure, spoke with niece Silvia, she plans to arrive at 1130 and will be with pt after procedure and drive her home.  Consent current; pt took 325 aspirin yesterday and this am at home. Potassium is elevated and creatinine has increased since 9/25; per pt on 9/25 pt had a CT with contrast; pt reports she drank \"a lot of water this week after CT\".  Cath lab staff updated; awaiting Dr Chan' decision.     Per Cath lab staff pt will have procedure.     "

## 2023-10-02 NOTE — PROGRESS NOTES
D/I/A: Pt roomed on 3C in bay 32.  Arrived via litter and accompanied by RN on monitor.  VSSA.  Rhythm upon arrival: SR on monitor.  Denies pain or sob.  Reviewed activity restrictions and when to notify RN, ie-changes to breathing or increased chest pressure or chest pain.  CCL access: Right internal jugular with primapore soft, c/d/I. Right radial TR band with 14cc of air-will start deflating air in an hour. CMS intact. Eating/Drinking.   P: Continue to monitor status.  Discharge to home once meeting criteria.       Hemigard Intro: Due to skin fragility and wound tension, it was decided to use HEMIGARD adhesive retention suture devices to permit a linear closure. The skin was cleaned and dried for a 6cm distance away from the wound. Excessive hair, if present, was removed to allow for adhesion.

## 2023-10-04 LAB
ATRIAL RATE - MUSE: 62 BPM
DIASTOLIC BLOOD PRESSURE - MUSE: NORMAL MMHG
INTERPRETATION ECG - MUSE: NORMAL
P AXIS - MUSE: 35 DEGREES
PR INTERVAL - MUSE: 178 MS
QRS DURATION - MUSE: 126 MS
QT - MUSE: 484 MS
QTC - MUSE: 491 MS
R AXIS - MUSE: -52 DEGREES
SYSTOLIC BLOOD PRESSURE - MUSE: NORMAL MMHG
T AXIS - MUSE: 9 DEGREES
VENTRICULAR RATE- MUSE: 62 BPM

## 2023-10-05 ENCOUNTER — CARE COORDINATION (OUTPATIENT)
Dept: CARDIOLOGY | Facility: CLINIC | Age: 78
End: 2023-10-05
Payer: COMMERCIAL

## 2023-10-05 DIAGNOSIS — I35.0 SEVERE AORTIC STENOSIS: Primary | ICD-10-CM

## 2023-10-05 DIAGNOSIS — I50.23 ACUTE ON CHRONIC SYSTOLIC CONGESTIVE HEART FAILURE (H): ICD-10-CM

## 2023-10-05 RX ORDER — LIDOCAINE 40 MG/G
CREAM TOPICAL
Status: CANCELLED | OUTPATIENT
Start: 2023-10-05

## 2023-10-05 RX ORDER — SODIUM CHLORIDE 9 MG/ML
INJECTION, SOLUTION INTRAVENOUS CONTINUOUS
Status: CANCELLED | OUTPATIENT
Start: 2023-10-05

## 2023-10-05 RX ORDER — CEFAZOLIN SODIUM 2 G/50ML
2 SOLUTION INTRAVENOUS
Status: CANCELLED | OUTPATIENT
Start: 2023-10-05

## 2023-10-05 RX ORDER — ASPIRIN 325 MG
325 TABLET ORAL DAILY
Status: CANCELLED | OUTPATIENT
Start: 2023-10-05

## 2023-10-05 NOTE — PROGRESS NOTES
Patient's case was discussed at Valve conference, attended by structural heart cardiologists, CV surgeons, CNP's, and structural heart care coordinators, on October 5, 2023    Patient is appropriate to proceed with TAVR   Catalan Valve  Size 26  Approach: Transfemoral    Special considerations:  Lower STJ Height's of 17 x 16 mm      Contacted patient to review discussion at Valve Conference.  Through shared decision making, patient agrees with the plan as outlined above.       Nga Dougherty RN  Lead Structural Heart Care Coordinator    TAVR, MitraClip and Watchman Programs  [facility]  Office: [phone]  -----------------------------------------------------------------------------------------  Date: 10/5/2023    Time of Call: 10:56 AM     Diagnosis:  Severe aortic stenosis     [ TORB ] Ordering provider: Colton Leyva MD  Order:   Case request:  TAVR  Echo  CBC, BMP, ABO/TS, INR, nt-pro BNP  Pre-TAVR procedure orders     Order received by: Haily Dougherty RN  -----------------------------------------------------------------------------------

## 2023-10-19 ENCOUNTER — VIRTUAL VISIT (OUTPATIENT)
Dept: CARDIOLOGY | Facility: CLINIC | Age: 78
End: 2023-10-19
Attending: THORACIC SURGERY (CARDIOTHORACIC VASCULAR SURGERY)
Payer: COMMERCIAL

## 2023-10-19 DIAGNOSIS — I35.0 SEVERE AORTIC STENOSIS: Primary | ICD-10-CM

## 2023-10-19 PROCEDURE — 99204 OFFICE O/P NEW MOD 45 MIN: CPT | Mod: 95 | Performed by: THORACIC SURGERY (CARDIOTHORACIC VASCULAR SURGERY)

## 2023-10-19 NOTE — PROGRESS NOTES
[facility] CARDIOTHORACIC SURGERY CONSULT  Patient Name: Jenna Kaur  Medical Record Number: [id number]  YOB: 1945  Room Number: Room/bed info not found  Referring Physician: Dr. Leyva    CC: Severe aortic stenosis    History of Present Illness:   Jenna Kaur is a very pleasant 77 year old female with severe aortic valvular stenosis referred to our clinic for evaluation and consideration for potential transcatheter aortic valve replacement (TAVR).  her severe aortic stenosis is characterized with an area of 0.52 cm2, mean gradient 68 mmHg and peak velocity of 5.3 m/sec with LVEF 55-60% by echocardiogram on 9/20/2023.  Additional notable medical history of CKD, HTN, HLD, hypothyroidism.     In 2017 had an echocardiogram which showed mild aortic stenosis with a mean gradient of 17 mmHg.  Then patient had a repeat echocardiogram and this showed moderate to severe aortic stenosis. His most recent in 2023 TTE showed critical aortic stenosis. Peak aortic velocity is 5.3 m/s mean gradient 68 mmHg, calculated valve area of 1.5 and DI of 0.16.      Currently patient joined a senior citizen exercise group.  He does admit to some shortness of breath while doing the exercise activity.    No chest pain or exertional dizziness. No lightheadedness, or dizziness.      Patient had questions answered. We had a phone call with her niece to describe our procedure.     Assessment and Plan:  Jenna Kaur is a 77 year old female with severe aortic stenosis  1) Agree with TAVR  2) Surgical bailout - yest   3) Please call with questions or concerns.     Thank you for the opportunity to participate in the care of this patient.    William Abernathy MD  Cardiothoracic Surgery  [phone]    Past Medical History:  Past Medical History:   Diagnosis Date    Heart disease     Hypertension        Past Surgical History:  Past Surgical History:   Procedure Laterality Date    ABDOMEN SURGERY      CHOLECYSTECTOMY      COLONOSCOPY       CV CORONARY ANGIOGRAM N/A 10/2/2023    Procedure: Coronary Angiogram;  Surgeon: Garett Real MD;  Location: [facility] CARDIAC CATH LAB    CV PCI N/A 10/2/2023    Procedure: Percutaneous Coronary Intervention;  Surgeon: Garett Real MD;  Location: [facility] CARDIAC CATH LAB    EYE SURGERY  Cataract surgery 11/2 & 11/16/2021        Family History:   Family History   Problem Relation Age of Onset    Breast Cancer Mother 70    Colon Cancer Father 59    Arrhythmia Brother     Sleep Apnea Brother        Social History:  Social History     Socioeconomic History    Marital status:      Spouse name: Not on file    Number of children: Not on file    Years of education: Not on file    Highest education level: Not on file   Occupational History    Not on file   Tobacco Use    Smoking status: Never    Smokeless tobacco: Never   Vaping Use    Vaping Use: Never used   Substance and Sexual Activity    Alcohol use: Never    Drug use: Never    Sexual activity: Never   Other Topics Concern    Parent/sibling w/ CABG, MI or angioplasty before 65F 55M? No   Social History Narrative    Not on file     Social Determinants of Health     Financial Resource Strain: Not on file   Food Insecurity: Not on file   Transportation Needs: Not on file   Physical Activity: Not on file   Stress: Not on file   Social Connections: Not on file   Interpersonal Safety: Not on file   Housing Stability: Not on file       Allergies:   Allergies   Allergen Reactions    Erythromycin GI Disturbance     Nausea and sick feeling       Medications:  Current Outpatient Medications   Medication    aspirin 81 MG EC tablet    Calcium Carbonate-Vitamin D (CALCIUM 500 + D PO)    carvedilol (COREG) 25 MG tablet    levothyroxine (SYNTHROID/LEVOTHROID) 112 MCG tablet    losartan (COZAAR) 50 MG tablet    polyethylene glycol (MIRALAX/GLYCOLAX) powder    simvastatin (ZOCOR) 10 MG tablet     No current facility-administered medications for this visit.  "      Review of Systems:   A 10 point ROS was performed and is negative other than HPI.    Physical Exam:  Telephone visit - deferred    Labs:  Lab Results   Component Value Date    WBC 8.4 10/02/2023    WBC 7.1 11/01/2018     Lab Results   Component Value Date    RBC 4.62 10/02/2023    RBC 4.61 11/01/2018     Lab Results   Component Value Date    HGB 14.0 10/02/2023    HGB 14.8 10/02/2023    HGB 14.3 11/01/2018     Lab Results   Component Value Date    HCT 42.7 10/02/2023    HCT 42.5 11/01/2018     No components found for: \"MCT\"  Lab Results   Component Value Date    MCV 92 10/02/2023    MCV 92 11/01/2018     Lab Results   Component Value Date    MCH 32.0 10/02/2023    MCH 31.0 11/01/2018     Lab Results   Component Value Date    MCHC 34.7 10/02/2023    MCHC 33.6 11/01/2018     Lab Results   Component Value Date    RDW 12.0 10/02/2023    RDW 12.2 11/01/2018     Lab Results   Component Value Date     10/02/2023     11/01/2018     Last Comprehensive Metabolic Panel:  Lab Results   Component Value Date     10/02/2023    POTASSIUM 5.3 10/02/2023    CHLORIDE 105 10/02/2023    CO2 26 10/02/2023    ANIONGAP 12 10/02/2023     (H) 10/02/2023    BUN 18.5 10/02/2023    CR 1.04 (H) 10/02/2023    GFRESTIMATED 54 (L) 10/02/2023    JOSE 10.0 10/02/2023         Imaging:  Echocardiogram:  9/20/2023     Very severe aortic stenosis (PV 5.4 m/s MG 68 mmHg DVI 0.15 CL 0.52 cm2 SVI  37 mL/m2.)   Left and right ventricular function is normal.The LV ejection fraction is 55-  60%. No regional wall motion abnormalities are seen.  This study was compared with the study from 8/15/22: Aortic stenosis has  worsened significantly and is now very severe.     CT TAVR: 9/25/2023  1.  Bicuspid aortic valve with fusion of the left and right coronary  cusp, Sue type 1. The aortic valve calcium score is 3067.  Planimeter valve area is 1.2 sq cm, consistent with severe stenosis.  Though the aortic cusps are severely " calcified, the  raphe is minimally calcified. The LVOT is not calcified. The ascending  aorta is not calcified, aortic arch is  mildly calcified, the  descending thoracic aorta is mildly calcified. There is no mitral  annular calcification.  1.  There are minimal adverse aortic root features, ie coronary  heights are adequate, there is minimal aortic root calcification, and  raphe is minimally calcified.  2.  There are severe native coronary calcifications.   3.  The arch vessel branching pattern is normal.   4.  The suprarenal abdominal aorta is mildly calcified; infrarenal  abdominal aorta is moderately calcified  5.  Widely patent origins of celiac trunk, superior mesenteric artery  and inferior mesenteric artery.  Single bilateral renal arteries with  widely patent origins.   7.  Femoral artery access site: no significant tortuosity or  calcification.   8. There is no acute aortic pathology, such as dissection, intramural  hematoma, or contained rupture.      MEASUREMENTS:   Representative dimensions of the thoracoabdominal aorta are as  follows:     1. AORTIC ANNULUS MEASUREMENTS:     1.  The average aortic annulus diameter is 29.3 mm, (long diameter is  23.4 mm and short diameter is 23.4 mm)  2.  Aortic annulus area is 5.41 cm2  3.  Aortic annulus perimeter is 84.2 mm  4.  Suggested 3-cusp coaxial angle for aortic valve is (WILLI 16 , CAU  9) and alternate A-P coaxial angle is (BARRIOS 0 , CAU 37), these angles  will vary depending upon the patient's body position  5.  Aortic root angle is 60.7 degrees  6.  Left main - Annulus distance: 12.2 mm, RCA - Annulus distance: 18  mm     2. LVOT MEASUREMENTS:     1.  The average LVOT diameter (measured 4 mm below annular plane) is  26.4 mm  2.  LVOT area is 5.46 cm2  3.  LVOT perimeter is 84.5 mm     3. AORTA MEASUREMENTS     1.  The aortic root at the sinuses of Valsalva: 31.3 mm x  27.2 mm x  34.6 mm  2.  The sinotubular junction:  32.2 mm x 31.1 mm  3.  Sinotubular  junction height: 17 mm x 16 mm  4.  Proximal ascending aorta: 37.5 mm x 35.7 mm  5.  Distal abdominal aorta proximal to the bifurcation: 15.8 mm x 15.5  mm  6.  Innominate artery 3 cm from ostium: 11.1 mm x 9.9 mm  7.  Left common carotid artery 3 cm from ostium: 6.9 mm x 6.5 mm     4. ILIOFEMORAL MEASUREMENTS:     RIGHT:  1.  Right common iliac artery: 10.6 mm x 9.3 mm   2.  Right external iliac artery: 8.9 mm x 7.7 mm   3.  Right common femoral artery: 9.3 mm x 8.7 mm   4.  Tortuosity: mild   5.  Calcification: mild      LEFT:  1.  Left common iliac artery: 9.9 mm x  8.4 mm  2.  Left external iliac artery: 8.0 mm x 7.3 mm  3.  Left common femoral artery: 9.2 mm x 7.9 mm  4.  Tortuosity: mild   5.  Calcification: mild      OTHER FINDINGS:   1.  Please review the separate Radiology report for incidental  noncardiac findings.     Coronary Angiogram and Right Heart Catheterization: pending        STS RISK SCORE: 2.5%  FRAILTY SCORE: Pending  NYHA CLASS: III

## 2023-10-19 NOTE — LETTER
10/19/2023      RE: Jenna Kaur  [address] [place] [address]       Dear Colleague,    Thank you for the opportunity to participate in the care of your patient, Jenna Kaur, at the [facility] [place] [facility]. Please see a copy of my visit note below.    [facility] CARDIOTHORACIC SURGERY CONSULT  Patient Name: Jenna Kaur  Medical Record Number: [id number]  YOB: 1945  Room Number: Room/bed info not found  Referring Physician: Dr. Leyva    CC: Severe aortic stenosis    History of Present Illness:   Jenna Kaur is a very pleasant 77 year old female with severe aortic valvular stenosis referred to our clinic for evaluation and consideration for potential transcatheter aortic valve replacement (TAVR).  her severe aortic stenosis is characterized with an area of 0.52 cm2, mean gradient 68 mmHg and peak velocity of 5.3 m/sec with LVEF 55-60% by echocardiogram on 9/20/2023.  Additional notable medical history of CKD, HTN, HLD, hypothyroidism.     In 2017 had an echocardiogram which showed mild aortic stenosis with a mean gradient of 17 mmHg.  Then patient had a repeat echocardiogram and this showed moderate to severe aortic stenosis. His most recent in 2023 TTE showed critical aortic stenosis. Peak aortic velocity is 5.3 m/s mean gradient 68 mmHg, calculated valve area of 1.5 and DI of 0.16.      Currently patient joined a senior [employer] exercise group.  He does admit to some shortness of breath while doing the exercise activity.    No chest pain or exertional dizziness. No lightheadedness, or dizziness.      Patient had questions answered. We had a phone call with her niece to describe our procedure.     Assessment and Plan:  Jenna Kaur is a 77 year old female with severe aortic stenosis  1) Agree with TAVR  2) Surgical bailout - yest   3) Please call with questions or concerns.     Thank you for the  opportunity to participate in the care of this patient.    William Abernathy MD  Cardiothoracic Surgery  [phone]    Past Medical History:  Past Medical History:   Diagnosis Date    Heart disease     Hypertension        Past Surgical History:  Past Surgical History:   Procedure Laterality Date    ABDOMEN SURGERY      CHOLECYSTECTOMY      COLONOSCOPY      CV CORONARY ANGIOGRAM N/A 10/2/2023    Procedure: Coronary Angiogram;  Surgeon: Garett Real MD;  Location: [facility] CARDIAC CATH LAB    CV PCI N/A 10/2/2023    Procedure: Percutaneous Coronary Intervention;  Surgeon: Garett Real MD;  Location: [facility] CARDIAC CATH LAB    EYE SURGERY  Cataract surgery 11/2 & 11/16/2021        Family History:   Family History   Problem Relation Age of Onset    Breast Cancer Mother 70    Colon Cancer Father 59    Arrhythmia Brother     Sleep Apnea Brother        Social History:  Social History     Socioeconomic History    Marital status:      Spouse name: Not on file    Number of children: Not on file    Years of education: Not on file    Highest education level: Not on file   Occupational History    Not on file   Tobacco Use    Smoking status: Never    Smokeless tobacco: Never   Vaping Use    Vaping Use: Never used   Substance and Sexual Activity    Alcohol use: Never    Drug use: Never    Sexual activity: Never   Other Topics Concern    Parent/sibling w/ CABG, MI or angioplasty before 65F 55M? No   Social History Narrative    Not on file     Social Determinants of Health     Financial Resource Strain: Not on file   Food Insecurity: Not on file   Transportation Needs: Not on file   Physical Activity: Not on file   Stress: Not on file   Social Connections: Not on file   Interpersonal Safety: Not on file   Housing Stability: Not on file       Allergies:   Allergies   Allergen Reactions    Erythromycin GI Disturbance     Nausea and sick feeling       Medications:  Current Outpatient Medications  "  Medication    aspirin 81 MG EC tablet    Calcium Carbonate-Vitamin D (CALCIUM 500 + D PO)    carvedilol (COREG) 25 MG tablet    levothyroxine (SYNTHROID/LEVOTHROID) 112 MCG tablet    losartan (COZAAR) 50 MG tablet    polyethylene glycol (MIRALAX/GLYCOLAX) powder    simvastatin (ZOCOR) 10 MG tablet     No current facility-administered medications for this visit.       Review of Systems:   A 10 point ROS was performed and is negative other than HPI.    Physical Exam:  Telephone visit - deferred    Labs:  Lab Results   Component Value Date    WBC 8.4 10/02/2023    WBC 7.1 11/01/2018     Lab Results   Component Value Date    RBC 4.62 10/02/2023    RBC 4.61 11/01/2018     Lab Results   Component Value Date    HGB 14.0 10/02/2023    HGB 14.8 10/02/2023    HGB 14.3 11/01/2018     Lab Results   Component Value Date    HCT 42.7 10/02/2023    HCT 42.5 11/01/2018     No components found for: \"MCT\"  Lab Results   Component Value Date    MCV 92 10/02/2023    MCV 92 11/01/2018     Lab Results   Component Value Date    MCH 32.0 10/02/2023    MCH 31.0 11/01/2018     Lab Results   Component Value Date    MCHC 34.7 10/02/2023    MCHC 33.6 11/01/2018     Lab Results   Component Value Date    RDW 12.0 10/02/2023    RDW 12.2 11/01/2018     Lab Results   Component Value Date     10/02/2023     11/01/2018     Last Comprehensive Metabolic Panel:  Lab Results   Component Value Date     10/02/2023    POTASSIUM 5.3 10/02/2023    CHLORIDE 105 10/02/2023    CO2 26 10/02/2023    ANIONGAP 12 10/02/2023     (H) 10/02/2023    BUN 18.5 10/02/2023    CR 1.04 (H) 10/02/2023    GFRESTIMATED 54 (L) 10/02/2023    JOSE 10.0 10/02/2023         Imaging:  Echocardiogram:  9/20/2023     Very severe aortic stenosis (PV 5.4 m/s MG 68 mmHg DVI 0.15 CL 0.52 cm2 SVI  37 mL/m2.)   Left and right ventricular function is normal.The LV ejection fraction is 55-  60%. No regional wall motion abnormalities are seen.  This study was compared " with the study from 8/15/22: Aortic stenosis has  worsened significantly and is now very severe.     CT TAVR: 9/25/2023  1.  Bicuspid aortic valve with fusion of the left and right coronary  cusp, Sue type 1. The aortic valve calcium score is 3067.  Planimeter valve area is 1.2 sq cm, consistent with severe stenosis.  Though the aortic cusps are severely calcified, the  raphe is minimally calcified. The LVOT is not calcified. The ascending  aorta is not calcified, aortic arch is  mildly calcified, the  descending thoracic aorta is mildly calcified. There is no mitral  annular calcification.  1.  There are minimal adverse aortic root features, ie coronary  heights are adequate, there is minimal aortic root calcification, and  raphe is minimally calcified.  2.  There are severe native coronary calcifications.   3.  The arch vessel branching pattern is normal.   4.  The suprarenal abdominal aorta is mildly calcified; infrarenal  abdominal aorta is moderately calcified  5.  Widely patent origins of celiac trunk, superior mesenteric artery  and inferior mesenteric artery.  Single bilateral renal arteries with  widely patent origins.   7.  Femoral artery access site: no significant tortuosity or  calcification.   8. There is no acute aortic pathology, such as dissection, intramural  hematoma, or contained rupture.      MEASUREMENTS:   Representative dimensions of the thoracoabdominal aorta are as  follows:     1. AORTIC ANNULUS MEASUREMENTS:     1.  The average aortic annulus diameter is 29.3 mm, (long diameter is  23.4 mm and short diameter is 23.4 mm)  2.  Aortic annulus area is 5.41 cm2  3.  Aortic annulus perimeter is 84.2 mm  4.  Suggested 3-cusp coaxial angle for aortic valve is (Greek 16 , CAU  9) and alternate A-P coaxial angle is (BARRIOS 0 , CAU 37), these angles  will vary depending upon the patient's body position  5.  Aortic root angle is 60.7 degrees  6.  Left main - Annulus distance: 12.2 mm, RCA - Annulus  distance: 18  mm     2. LVOT MEASUREMENTS:     1.  The average LVOT diameter (measured 4 mm below annular plane) is  26.4 mm  2.  LVOT area is 5.46 cm2  3.  LVOT perimeter is 84.5 mm     3. AORTA MEASUREMENTS     1.  The aortic root at the sinuses of Valsalva: 31.3 mm x  27.2 mm x  34.6 mm  2.  The sinotubular junction:  32.2 mm x 31.1 mm  3.  Sinotubular junction height: 17 mm x 16 mm  4.  Proximal ascending aorta: 37.5 mm x 35.7 mm  5.  Distal abdominal aorta proximal to the bifurcation: 15.8 mm x 15.5  mm  6.  Innominate artery 3 cm from ostium: 11.1 mm x 9.9 mm  7.  Left common carotid artery 3 cm from ostium: 6.9 mm x 6.5 mm     4. ILIOFEMORAL MEASUREMENTS:     RIGHT:  1.  Right common iliac artery: 10.6 mm x 9.3 mm   2.  Right external iliac artery: 8.9 mm x 7.7 mm   3.  Right common femoral artery: 9.3 mm x 8.7 mm   4.  Tortuosity: mild   5.  Calcification: mild      LEFT:  1.  Left common iliac artery: 9.9 mm x  8.4 mm  2.  Left external iliac artery: 8.0 mm x 7.3 mm  3.  Left common femoral artery: 9.2 mm x 7.9 mm  4.  Tortuosity: mild   5.  Calcification: mild      OTHER FINDINGS:   1.  Please review the separate Radiology report for incidental  noncardiac findings.     Coronary Angiogram and Right Heart Catheterization: pending        STS RISK SCORE: 2.5%  FRAILTY SCORE: Pending  NYHA CLASS: III      Please do not hesitate to contact me if you have any questions/concerns.     Sincerely,     William Abernathy MD

## 2023-10-22 LAB
ABO/RH(D): NORMAL
ANTIBODY SCREEN: NEGATIVE
SPECIMEN EXPIRATION DATE: NORMAL

## 2023-10-23 ENCOUNTER — LAB (OUTPATIENT)
Dept: LAB | Facility: CLINIC | Age: 78
End: 2023-10-23
Payer: COMMERCIAL

## 2023-10-23 ENCOUNTER — CARE COORDINATION (OUTPATIENT)
Dept: CARDIOLOGY | Facility: CLINIC | Age: 78
End: 2023-10-23

## 2023-10-23 ENCOUNTER — OFFICE VISIT (OUTPATIENT)
Dept: CARDIOLOGY | Facility: CLINIC | Age: 78
DRG: 266 | End: 2023-10-23
Attending: NURSE PRACTITIONER
Payer: COMMERCIAL

## 2023-10-23 VITALS
WEIGHT: 193.6 LBS | BODY MASS INDEX: 32.22 KG/M2 | DIASTOLIC BLOOD PRESSURE: 86 MMHG | SYSTOLIC BLOOD PRESSURE: 158 MMHG | HEART RATE: 64 BPM | OXYGEN SATURATION: 96 %

## 2023-10-23 DIAGNOSIS — I50.33 ACUTE ON CHRONIC DIASTOLIC CONGESTIVE HEART FAILURE (H): ICD-10-CM

## 2023-10-23 DIAGNOSIS — Z01.818 PRE-OP EXAM: Primary | ICD-10-CM

## 2023-10-23 DIAGNOSIS — I35.0 SEVERE AORTIC STENOSIS: ICD-10-CM

## 2023-10-23 DIAGNOSIS — I50.23 ACUTE ON CHRONIC SYSTOLIC CONGESTIVE HEART FAILURE (H): ICD-10-CM

## 2023-10-23 LAB
ANION GAP SERPL CALCULATED.3IONS-SCNC: 10 MMOL/L (ref 7–15)
BUN SERPL-MCNC: 13.2 MG/DL (ref 8–23)
CALCIUM SERPL-MCNC: 9.8 MG/DL (ref 8.8–10.2)
CHLORIDE SERPL-SCNC: 104 MMOL/L (ref 98–107)
CREAT SERPL-MCNC: 0.86 MG/DL (ref 0.51–0.95)
DEPRECATED HCO3 PLAS-SCNC: 28 MMOL/L (ref 22–29)
EGFRCR SERPLBLD CKD-EPI 2021: 68 ML/MIN/1.73M2
ERYTHROCYTE [DISTWIDTH] IN BLOOD BY AUTOMATED COUNT: 11.9 % (ref 10–15)
GLUCOSE SERPL-MCNC: 117 MG/DL (ref 70–99)
HCT VFR BLD AUTO: 42.7 % (ref 35–47)
HGB BLD-MCNC: 14.8 G/DL (ref 11.7–15.7)
INR PPP: 1.2 (ref 0.85–1.15)
MCH RBC QN AUTO: 31.4 PG (ref 26.5–33)
MCHC RBC AUTO-ENTMCNC: 34.7 G/DL (ref 31.5–36.5)
MCV RBC AUTO: 91 FL (ref 78–100)
NT-PROBNP SERPL-MCNC: 2518 PG/ML (ref 0–1800)
PLATELET # BLD AUTO: 191 10E3/UL (ref 150–450)
POTASSIUM SERPL-SCNC: 3.9 MMOL/L (ref 3.4–5.3)
RBC # BLD AUTO: 4.72 10E6/UL (ref 3.8–5.2)
SODIUM SERPL-SCNC: 142 MMOL/L (ref 135–145)
WBC # BLD AUTO: 6.9 10E3/UL (ref 4–11)

## 2023-10-23 PROCEDURE — 86901 BLOOD TYPING SEROLOGIC RH(D): CPT | Performed by: INTERNAL MEDICINE

## 2023-10-23 PROCEDURE — 86850 RBC ANTIBODY SCREEN: CPT | Performed by: INTERNAL MEDICINE

## 2023-10-23 PROCEDURE — 85610 PROTHROMBIN TIME: CPT | Performed by: PATHOLOGY

## 2023-10-23 PROCEDURE — 93010 ELECTROCARDIOGRAM REPORT: CPT | Performed by: INTERNAL MEDICINE

## 2023-10-23 PROCEDURE — 36415 COLL VENOUS BLD VENIPUNCTURE: CPT | Performed by: PATHOLOGY

## 2023-10-23 PROCEDURE — 80048 BASIC METABOLIC PNL TOTAL CA: CPT | Performed by: PATHOLOGY

## 2023-10-23 PROCEDURE — 99214 OFFICE O/P EST MOD 30 MIN: CPT | Performed by: NURSE PRACTITIONER

## 2023-10-23 PROCEDURE — G0463 HOSPITAL OUTPT CLINIC VISIT: HCPCS | Performed by: NURSE PRACTITIONER

## 2023-10-23 PROCEDURE — 93005 ELECTROCARDIOGRAM TRACING: CPT

## 2023-10-23 PROCEDURE — 85027 COMPLETE CBC AUTOMATED: CPT | Performed by: PATHOLOGY

## 2023-10-23 PROCEDURE — 83880 ASSAY OF NATRIURETIC PEPTIDE: CPT | Performed by: PATHOLOGY

## 2023-10-23 ASSESSMENT — PAIN SCALES - GENERAL: PAINLEVEL: NO PAIN (0)

## 2023-10-23 NOTE — H&P
[facility]  Jenna Kaur MRN: [id number]  1945  Date of Admission: (Not on file)  Primary care provider: Kenna Garcia         Chief Complaint:   S/p TAVR      Assessment and Plan:     Jenna Kaur is a 77 year old female with a PMH significant for CKD, HTN, HLD, heart failure with preserved ejection fraction, RBBB hypothyroidism and severe aortic stenosis who was admitted for planned TAVR.      # Severe aortic stenosis  # s/p TAVR  # Hx RBBB  # Acute on chronic diastolic heart failure NYHA class II symptoms  Prior to procedure, pt noted to have a mean gradient 68 mmHG, CL 0.52 cm^2, PV 5.3 m/s. Now s/p TAVR with 26 mm Edward Jae valve. Procedure was uncomplicated. Post procedure MG 6 mm Hg. NT-BNP 2,518, LVEDP > 18, patient appears euvolemic.     - Bedrest per protocol.    - Neuro checks, per protocol.  - Monitor groin sites.   - EKG in morning.   - Echocardiogram tomorrow.   - Aspirin 81 mg for anti-platelet therapy.   - Cardiac rehab/PT/OT.  - Daily weights.   - Strict intake/output.   - Biotel cardiac monitor at discharge for history of RBBB and high risk conduction issues post TAVR     Chronic Issues:  # HTN: BP post procedure 84/64. Hold PTA Coreg for concern for AVB post TAVR, hold PTA Losartan 50 mg daily tonight, will reassess tomorrow as sedation checks  # HLD: continue PTA Simvastatin 10 mg daily  # Hypothyroidism: continue PTA Synthroid 112 mcg daily  # CKD: baseline Crt 0.8-1, Crt on admission 0.86    DVT ppx: SCD/ambulation  Diet: 2 g sodium   CODE: Full  Disposition: Anticipate discharge home in 1-2 days pending PT/OT assessment    Medical Decision Making       75 MINUTES SPENT BY ME on the date of service doing chart review, history, exam, documentation & further activities per the note.      Nahomi Lanier, APRN, CNP  [facility] Cardiology Team    Clinically Significant Risk Factors Present on Admission               # Coagulation Defect:  "INR = 1.20 (Ref range: 0.85 - 1.15) and/or PTT = N/A, will monitor for bleeding  # Drug Induced Platelet Defect: home medication list includes an antiplatelet medication   # Obesity: Estimated body mass index is 30.63 kg/m  as calculated from the following:    Height as of this encounter: 1.651 m (5' 5\").    Weight as of this encounter: 83.5 kg (184 lb 1.4 oz).    Cardiovascular: Aortic valve stenosis    Nephrology: Stage 3a (GFR 45-59)           History of Present Illness:   Jenna Kaur is a 77 year old female with a PMH significant for  CKD, HTN, HLD, hypothyroidism and severe aortic stenosis who was admitted for planned TAVR.     In 2017 had an echocardiogram which showed mild aortic stenosis with a mean gradient of 17 mmHg.  Then patient had a repeat echocardiogram and this showed moderate to severe aortic stenosis. His most recent in 2023 TTE showed critical aortic stenosis. Peak aortic velocity is 5.3 m/s mean gradient 68 mmHg, calculated valve area of 1.5 and DI of 0.16.      Currently patient joined a senior citizen exercise group.  He does admit to some shortness of breath while doing the exercise activity. No chest pain or exertional dizziness. No lightheadedness, or dizziness    Underwent planned TAVR with 26 mm Edward Jae valve. Procedure was uncomplicated, immediate post op MG 6 mm Hg. Patient seen in PACU and denies any concerns. No headache, visual changes, numbness, weakness, or speech difficulties. No dyspnea, chest pain, or palpitations. No pain around access sites. No bleeding.          Review of Systems:    10 point review of systems negative except for stated above in HPI.          Past Medical History:   Medical History reviewed.   Past Medical History:   Diagnosis Date    Heart disease     Hypertension              Past Surgical History:   Surgical History reviewed.   Past Surgical History:   Procedure Laterality Date    ABDOMEN SURGERY      CHOLECYSTECTOMY      COLONOSCOPY      CV " "CORONARY ANGIOGRAM N/A 10/2/2023    Procedure: Coronary Angiogram;  Surgeon: Garett Real MD;  Location: [facility] CARDIAC CATH LAB    CV PCI N/A 10/2/2023    Procedure: Percutaneous Coronary Intervention;  Surgeon: Garett Real MD;  Location: [facility] CARDIAC CATH LAB    EYE SURGERY  Cataract surgery 11/2 & 11/16/2021             Social History:   Social History reviewed.  Social History     Tobacco Use    Smoking status: Never    Smokeless tobacco: Never   Substance Use Topics    Alcohol use: Never             Family History:   Family History reviewed.   Family History   Problem Relation Age of Onset    Breast Cancer Mother 70    Colon Cancer Father 59    Arrhythmia Brother     Sleep Apnea Brother              Allergies:     Allergies   Allergen Reactions    Erythromycin GI Disturbance     Nausea and sick feeling             Medications:   Medications Reviewed.   Current Facility-Administered Medications   Medication    iopamidol (ISOVUE-370) solution    lactated ringers infusion    lidocaine (LMX4) cream    lidocaine (LMX4) cream    lidocaine 1 % 0.1-1 mL    lidocaine 1 % 0.1-1 mL    lidocaine 1 %    lidocaine 1 %    Patient is NOT allergic to contrast dye    sodium chloride (PF) 0.9% PF flush 3 mL    sodium chloride (PF) 0.9% PF flush 3 mL    sodium chloride (PF) 0.9% PF flush 3 mL    sodium chloride (PF) 0.9% PF flush 3 mL    sodium chloride (PF) 0.9% PF flush 3 mL    sodium chloride 0.9 % infusion             Physical Exam:   Vitals were reviewed.  Blood pressure 112/54, pulse 60, temperature 97  F (36.1  C), temperature source Axillary, resp. rate 19, height 1.651 m (5' 5\"), weight 83.5 kg (184 lb 1.4 oz), SpO2 96%, not currently breastfeeding.    General: Pleasant and well appearing elderly female. Appears comfortable and in no acute distress. Alert and interactive.   Skin: Not jaundiced, no rash, no ecchymoses, warm and dry to touch.  HEENT: NCAT. EOMI. Sclera clear.   CV: RRR, normal " S1S2, no systolic murmur present. Bilateral radial, femoral, and pedal pulses palpable. Bilateral femoral access sites are c/d/i. Flat and soft to palpation. No hematoma.    Resp: Normal work of breathing on room air. Clear to auscultation bilaterally, no wheezes, rhonchi  Extremities: warm and well perfused, no edema or cyanosis   Neuro: Alert and oriented x 3, CN grossly intact. Moves all extremities. Speech normal. No lateralizing symptoms or focal neurologic deficits  Psych: Mood and affect appropriate.         Labs:     CMP  Recent Labs   Lab 10/25/23  0621 10/23/23  1125   NA  --  142   POTASSIUM  --  3.9   CHLORIDE  --  104   CO2  --  28   ANIONGAP  --  10   * 117*   BUN  --  13.2   CR  --  0.86   GFRESTIMATED  --  68   JOSE  --  9.8     CBC  Recent Labs   Lab 10/23/23  1125   WBC 6.9   RBC 4.72   HGB 14.8   HCT 42.7   MCV 91   MCH 31.4   MCHC 34.7   RDW 11.9        INR  Recent Labs   Lab 10/23/23  1125   INR 1.20*           Diagnostics:      EKG 10/2/23:      ECHO 9/20/23:  Interpretation Summary     Very severe aortic stenosis (PV 5.4 m/s MG 68 mmHg DVI 0.15 CL 0.52 cm2 SVI  37 mL/m2.) Recommend structural cardiology (valve clinic) referral.     Left and right ventricular function is normal.The LV ejection fraction is 55-  60%. No regional wall motion abnormalities are seen.  This study was compared with the study from 8/15/22: Aortic stenosis has  worsened significantly and is now very severe.    Pre-TAVR Coronary angiogram 10/2/23:  Coronary Findings    Diagnostic  Dominance: Right  Left Anterior Descending   The vessel is angiographically normal.      First Diagonal Branch   The vessel is large.      Second Diagonal Branch   The vessel is small.      Third Diagonal Branch   The vessel is small.      Left Circumflex   The vessel is small.      First Obtuse Marginal Branch   The vessel is small.      Second Obtuse Marginal Branch   The vessel is large. The vessel exhibits minimal luminal  irregularities.      Right Coronary Artery   The vessel is small.      Acute Marginal Branch   The vessel is small.      Right Ventricular Branch   The vessel is small.      Right Posterior Descending Artery   The vessel is small.         Intervention     No interventions have been documented.     Physician Attestation   I have reviewed and discussed with the advanced practice provider their history, physical and plan for Jenna Kaur. I did not participate in a shared visit by interviewing or examining the patient and this should be billed as an advanced practice provider only visit.    Colton Leyva MD  Interventional Cardiology

## 2023-10-23 NOTE — PATIENT INSTRUCTIONS
TAVR Procedure:  Wednesday, October 25; 1st case     Check in to the hospital, admitting area on the main floor at 5:30 a.m.     [facility] [address]  [address] [place]  [address]     -   parking is available in front of the hospital, 24 hours a day  -  Stop at the Information Desk and the admissions desk in the lobby.        * Please check in at 5:30 a.m.  You will be directed to the Family Surgery Waiting Room, on the 3rd Floor. If you need assistance in walking, please inform people at the time of check in.  * After leaving the registration check-in area, there will be TWO visitors allowed to the pre-op area.  * When patients leave the pre-op area for their procedure, the visitors have the option to stay and wait in the family waiting area or leave the hospital.   * After the procedure is finished, the MD will call the visitor, or see them face-to-face, and update them on the procedure.     * When the patient arrives to the unit where they will stay until discharge, up to four visitors are allowed to come visit at that time.     -------------------------------------------------------------------------  Nothing to eat after midnight; water, apple juice or 7up/Sprite is OK up to two hours prior to your scheduled procedure.  You may take your medications in the morning with a sip of water.        *Please use the special cleansing wipes, received at your pre-op History and Physical, the night before and the morning of your procedure.  Please focus the use of these wipes from neck to groin, avoiding the face and genital area.        If you did NOT receive these special cleansing wipes at your pre-op History and Physical, then:   * Please use a soap such as hibicleanse or chlorhexadine.  This can be purchased, over the counter, at a pharmacy.  If this is not available, please use an antibacterial soap.  * Take a shower, with antibacterial soap, the night before the  procedure, and the morning of the procedure.  Focus the use of this soap from neck to groin, avoiding the face and genital area.     Medications Instructions:  -- PLEASE TAKE: Aspirin 325 mg, by mouth the night before the procedure and the morning of the procedure.     -- OK to take morning of procedure, or, night before the procedure, it that is how it's prescribed:  carvedilol   levothyroxine   simvastatin      -- HOLD these medications the morning of the procedure:  Calcium Carb-Cholecalciferol   Losartan     If any questions please contact:  Nga Dougherty RN  Structural Heart Care Coordinator  [facility]  Office: [phone]  Pager: [phone]

## 2023-10-23 NOTE — PROGRESS NOTES
TAVR Procedure:  Wednesday, October 25; 1st case    Check in to the hospital, admitting area on the main floor at 5:30 a.m.    [facility] [address]  [address] [place]  [address]     -   parking is available in front of the hospital, 24 hours a day  -  Stop at the Information Desk and the admissions desk in the lobby.      * Please check in at 5:30 a.m.  You will be directed to the Family Surgery Waiting Room, on the 3rd Floor. If you need assistance in walking, please inform people at the time of check in.  * After leaving the registration check-in area, there will be TWO visitors allowed to the pre-op area.  * When patients leave the pre-op area for their procedure, the visitors have the option to stay and wait in the family waiting area or leave the hospital.   * After the procedure is finished, the MD will call the visitor, or see them face-to-face, and update them on the procedure.     * When the patient arrives to the unit where they will stay until discharge, up to four visitors are allowed to come visit at that time.    -------------------------------------------------------------------------  Nothing to eat after midnight; water, apple juice or 7up/Sprite is OK up to two hours prior to your scheduled procedure.  You may take your medications in the morning with a sip of water.      *Please use the special cleansing wipes, received at your pre-op History and Physical, the night before and the morning of your procedure.  Please focus the use of these wipes from neck to groin, avoiding the face and genital area.      If you did NOT receive these special cleansing wipes at your pre-op History and Physical, then:   * Please use a soap such as hibicleanse or chlorhexadine.  This can be purchased, over the counter, at a pharmacy.  If this is not available, please use an antibacterial soap.  * Take a shower, with antibacterial soap, the night before the procedure,  and the morning of the procedure.  Focus the use of this soap from neck to groin, avoiding the face and genital area.    Medications Instructions:  -- PLEASE TAKE: Aspirin 325 mg, by mouth the night before the procedure and the morning of the procedure.    -- OK to take morning of procedure, or, night before the procedure, it that is how it's prescribed:  carvedilol   levothyroxine   simvastatin     -- HOLD these medications the morning of the procedure:  Calcium Carb-Cholecalciferol   Losartan    If any questions please contact:  Nga Dougherty RN  Structural Heart Care Coordinator  [facility]  Office: [phone]  Pager: [phone]

## 2023-10-23 NOTE — LETTER
10/23/2023      RE: Jenna Kaur  [address] [address] [place] [address]       Dear Colleague,    Thank you for the opportunity to participate in the care of your patient, Jenna Kaur, at the [facility] [place] [facility]. Please see a copy of my visit note below.        STRUCTURAL HEART CLINIC  H&P    Referring Provider: Dr. Leyva   Primary Cardiologist: Dr. Orellana    History of Present Illness  Jenna Kaur is a 77 year old female who presents for pre-operative H&P in preparation for transcatheter aortic valve replacement on 10/25/23 at [facility].     Patient with a history of moderate aortic stenosis, recently progressed to severe characterized with an CL of 0.52 cm2, mean gradient 68 mmHg and peak velocity of 5.3 m/sec with LVEF 55-60%, DI 0.16 by echocardiogram on 9/20/2023. She reports symptoms of exertional dyspnea. Patient was evaluated in structural heart clinic where she was deemed an appropriate TAVR candidate. She was counseled for the above procedure.      Additional notable medical history of HTN, HLD, hypothyroidism, mild CAD pre TAVR cath, acute on chronic diastolic heart failure.     Feeling well, anxious but otherwise okay. No new cardiac symptoms. No infectious symptoms.      History of blood transfusions/reactions: No  History of abnormal bleeding/anti-platelet use: No  Steroid use in the last year: No  Personal or family history with difficulty with anesthesia: No   Infection precautions: No  Difficulty w/intubation/bedrest: No    Current Medications:  Current Outpatient Medications   Medication Sig Dispense Refill    aspirin 81 MG EC tablet Take 81 mg by mouth daily      Calcium Carbonate-Vitamin D (CALCIUM 500 + D PO) Take  by mouth daily. Takes 2 tablets      carvedilol (COREG) 25 MG tablet Take 1 tablet (25 mg) by mouth 2 times daily (with meals) 180 tablet 3     levothyroxine (SYNTHROID/LEVOTHROID) 112 MCG tablet Take 1 tablet (112 mcg) by mouth daily 90 tablet 3    losartan (COZAAR) 50 MG tablet Take 1 tablet (50 mg) by mouth daily 90 tablet 3    polyethylene glycol (MIRALAX/GLYCOLAX) powder as needed      simvastatin (ZOCOR) 10 MG tablet Take 1 tablet (10 mg) by mouth At Bedtime 90 tablet 3       Allergies:     Allergies   Allergen Reactions    Erythromycin GI Disturbance     Nausea and sick feeling       Past Medical History:  Past Medical History:   Diagnosis Date    Heart disease     Hypertension        Past Surgical History:  Past Surgical History:   Procedure Laterality Date    ABDOMEN SURGERY      CHOLECYSTECTOMY      COLONOSCOPY      CV CORONARY ANGIOGRAM N/A 10/2/2023    Procedure: Coronary Angiogram;  Surgeon: Garett Real MD;  Location: [facility] CARDIAC CATH LAB    CV PCI N/A 10/2/2023    Procedure: Percutaneous Coronary Intervention;  Surgeon: Garett Real MD;  Location: [facility] CARDIAC CATH LAB    EYE SURGERY  Cataract surgery 11/2 & 11/16/2021       Family History:  Family History   Problem Relation Age of Onset    Breast Cancer Mother 70    Colon Cancer Father 59    Arrhythmia Brother     Sleep Apnea Brother        Social History:  Social History     Socioeconomic History    Marital status:    Tobacco Use    Smoking status: Never    Smokeless tobacco: Never   Vaping Use    Vaping Use: Never used   Substance and Sexual Activity    Alcohol use: Never    Drug use: Never    Sexual activity: Never   Other Topics Concern    Parent/sibling w/ CABG, MI or angioplasty before 65F 55M? No       Review of Systems:    Functional status: Independent in ADL's. Able to achieve METS > 4.    Constitutional: No fever, chills, or sweats. No weight gain/loss   ENT: No visual disturbance, ear ache, epistaxis, sore throat  Allergies/Immunologic: Negative.   Respiratory: No cough, hemoptysia  Cardiovascular: As per HPI  GI: No nausea, vomiting,  hematemesis, melena, or hematochezia  : No urinary frequency, dysuria, or hematuria  Integument: Negative  Psychiatric: Negative  Neuro: Negative  Endocrinology: Negative   Musculoskeletal: Negative      Physical Exam:  Vitals: BP (!) 158/86 (BP Location: Right arm, Patient Position: Chair, Cuff Size: Adult Regular)   Pulse 64   Wt 87.8 kg (193 lb 9.6 oz)   SpO2 96%   BMI 32.22 kg/m     General: NAD  HEENT:  Dentition intact.    Neck: No jugular venous distension.   Heart: RRR with 3/6 JESUS ALBERTO at RUSB  Lungs: CTA.    Abdomen: Soft, nontender, nondistended.   Extremities: No clubbing, cyanosis, or edema.  The pulses are +4/4 at the radial, brachial, femoral, popliteal, DP, and PT sites bilaterally.  No bruits are noted.  Neurologic: Alert and oriented to person/place/time, normal speech, gait and affect  Skin: No petechiae, purpura or rash.    Diagnostic Studies:    ECG:  Personally reviewed and interpreted by me.  NSR occasional PVCs and RBBB    ECHO 9/20/23  Interpretation Summary     Very severe aortic stenosis (PV 5.4 m/s MG 68 mmHg DVI 0.15 CL 0.52 cm2 SVI  37 mL/m2.) Recommend structural cardiology (valve clinic) referral.     Left and right ventricular function is normal.The LV ejection fraction is 55-  60%. No regional wall motion abnormalities are seen.  This study was compared with the study from 8/15/22: Aortic stenosis has  worsened significantly and is now very severe.  ______________________________________________________________________________  Left Ventricle  Left ventricular function is normal.The ejection fraction is 55-60%. Left  ventricular size is normal. Relative wall thickness is increased consistent  with concentric remodeling. Left ventricular diastolic function is abnormal.  Grade II or moderate diastolic dysfunction. Diastolic Doppler findings (E/E'  ratio and/or other parameters) suggest left ventricular filling pressures are  increased. No regional wall motion abnormalities are  seen.     Right Ventricle  Right ventricular function, chamber size, wall motion, and thickness are  normal.     Atria  The right atria appears normal. Severe left atrial enlargement is present.     Mitral Valve  The mitral valve is normal.     Aortic Valve  Moderate aortic valve calcification is present. Severe aortic stenosis is  present. The calculated aortic valve are is 0.52 cm^2. The mean AoV pressure  gradient is 67.9 mmHg. The peak aortic velocity is 5.38 m/sec. The V2/V1 ratio  is 0.16.     Tricuspid Valve  The tricuspid valve is normal. The peak velocity of the tricuspid regurgitant  jet is not obtainable.     Pulmonic Valve  The valve leaflets are not well visualized. On Doppler interrogation, there is  no significant stenosis or regurgitation.     Vessels  The aorta root is normal. The thoracic aorta is normal. The inferior vena cava  was normal in size with preserved respiratory variability. IVC diameter <2.1  cm collapsing >50% with sniff suggests a normal RA pressure of 3 mmHg.     Pericardium  No pericardial effusion is present.     Compared to Previous Study  This study was compared with the study from 8/15/22 . Aortic stenosis has  worsened significantly and is now very severe.     Attestation  I have personally viewed the imaging and agree with the interpretation and  report as documented by the fellow, Riaz Oakley, and/or edited by me.  ________________________________________________    CT TAVR 9/25/23    FINDINGS:     1.  Bicuspid aortic valve with fusion of the left and right coronary  cusp, Sue type 1. The aortic valve calcium score is 3067.  Planimeter valve area is 1.2 sq cm, consistent with severe stenosis.  Though the aortic cusps are severely calcified, the  raphe is minimally calcified. The LVOT is not calcified. The ascending  aorta is not calcified, aortic arch is  mildly calcified, the  descending thoracic aorta is mildly calcified. There is no mitral  annular calcification.  1.   There are minimal adverse aortic root features, ie coronary  heights are adequate, there is minimal aortic root calcification, and  raphe is minimally calcified.  2.  There are severe native coronary calcifications.   3.  The arch vessel branching pattern is normal.   4.  The suprarenal abdominal aorta is mildly calcified; infrarenal  abdominal aorta is moderately calcified  5.  Widely patent origins of celiac trunk, superior mesenteric artery  and inferior mesenteric artery.  Single bilateral renal arteries with  widely patent origins.   7.  Femoral artery access site: no significant tortuosity or  calcification.   8. There is no acute aortic pathology, such as dissection, intramural  hematoma, or contained rupture.      MEASUREMENTS:   Representative dimensions of the thoracoabdominal aorta are as  follows:     1. AORTIC ANNULUS MEASUREMENTS:     1.  The average aortic annulus diameter is 29.3 mm, (long diameter is  23.4 mm and short diameter is 23.4 mm)  2.  Aortic annulus area is 5.41 cm2  3.  Aortic annulus perimeter is 84.2 mm  4.  Suggested 3-cusp coaxial angle for aortic valve is (Amharic 16 , CAU  9) and alternate A-P coaxial angle is (BARRIOS 0 , CAU 37), these angles  will vary depending upon the patient's body position  5.  Aortic root angle is 60.7 degrees  6.  Left main - Annulus distance: 12.2 mm, RCA - Annulus distance: 18  mm     2. LVOT MEASUREMENTS:     1.  The average LVOT diameter (measured 4 mm below annular plane) is  26.4 mm  2.  LVOT area is 5.46 cm2  3.  LVOT perimeter is 84.5 mm     3. AORTA MEASUREMENTS     1.  The aortic root at the sinuses of Valsalva: 31.3 mm x  27.2 mm x  34.6 mm  2.  The sinotubular junction:  32.2 mm x 31.1 mm  3.  Sinotubular junction height: 17 mm x 16 mm  4.  Proximal ascending aorta: 37.5 mm x 35.7 mm  5.  Distal abdominal aorta proximal to the bifurcation: 15.8 mm x 15.5  mm  6.  Innominate artery 3 cm from ostium: 11.1 mm x 9.9 mm  7.  Left common carotid artery 3 cm  "from ostium: 6.9 mm x 6.5 mm     4. ILIOFEMORAL MEASUREMENTS:     RIGHT:  1.  Right common iliac artery: 10.6 mm x 9.3 mm   2.  Right external iliac artery: 8.9 mm x 7.7 mm   3.  Right common femoral artery: 9.3 mm x 8.7 mm   4.  Tortuosity: mild   5.  Calcification: mild      LEFT:  1.  Left common iliac artery: 9.9 mm x  8.4 mm  2.  Left external iliac artery: 8.0 mm x 7.3 mm  3.  Left common femoral artery: 9.2 mm x 7.9 mm  4.  Tortuosity: mild   5.  Calcification: mild      OTHER FINDINGS:   1.  Please review the separate Radiology report for incidental  noncardiac findings.     I have personally reviewed the examination and initial interpretation  and I agree with the findings.     RAMAKRISHNA WITT MD     Laboratory Studies:  Personally reviewed and interpreted by me.    LIPID RESULTS:  Lab Results   Component Value Date    CHOL 153 02/22/2023    CHOL 162 12/11/2020    HDL 69 02/22/2023    HDL 65 12/11/2020    LDL 64 02/22/2023    LDL 76 12/11/2020    TRIG 102 02/22/2023    TRIG 106 12/11/2020       LIVER ENZYME RESULTS:  No results found for: \"AST\", \"ALT\"    CBC RESULTS:  Lab Results   Component Value Date    WBC 8.4 10/02/2023    WBC 7.1 11/01/2018    RBC 4.62 10/02/2023    RBC 4.61 11/01/2018    HGB 14.0 10/02/2023    HGB 14.8 10/02/2023    HGB 14.3 11/01/2018    HCT 42.7 10/02/2023    HCT 42.5 11/01/2018    MCV 92 10/02/2023    MCV 92 11/01/2018    MCH 32.0 10/02/2023    MCH 31.0 11/01/2018    MCHC 34.7 10/02/2023    MCHC 33.6 11/01/2018    RDW 12.0 10/02/2023    RDW 12.2 11/01/2018     10/02/2023     11/01/2018       BMP RESULTS:  Lab Results   Component Value Date     10/02/2023     02/10/2021    POTASSIUM 5.3 10/02/2023    POTASSIUM 4.1 02/22/2023    POTASSIUM 4.5 02/10/2021    CHLORIDE 105 10/02/2023    CHLORIDE 111 (H) 02/22/2023    CHLORIDE 105 02/10/2021    CO2 26 10/02/2023    CO2 28 02/22/2023    CO2 31 02/10/2021    ANIONGAP 12 10/02/2023    ANIONGAP 5 02/22/2023    " ANIONGAP 3 02/10/2021     (H) 10/02/2023     (H) 02/22/2023     (H) 02/10/2021    BUN 18.5 10/02/2023    BUN 25 02/22/2023    BUN 23 02/10/2021    CR 1.04 (H) 10/02/2023    CR 1.00 02/10/2021    GFRESTIMATED 54 (L) 10/02/2023    GFRESTIMATED 54 (L) 02/10/2021    GFRESTBLACK 63 02/10/2021    JOSE 10.0 10/02/2023    JOSE 9.1 02/10/2021        A1C RESULTS:  Lab Results   Component Value Date    A1C 5.2 01/31/2022    A1C 5.4 11/01/2018       INR RESULTS:  Lab Results   Component Value Date    INR 1.15 10/02/2023       Assessment and Plan   Jenna Kaur is a 77 year old female who presents for pre-operative H&P in preparation for transcatheter aortic valve replacement on 10/25/23 at [facility].     She has the following specific operative considerations:      - RCRI score 1 corresponding with a 1% perioperative risk of MACE      - ESTHER # of risks 2/8      - VTE risk = 2. If equal to or > 4, pharmacologic prophylaxis is indicated      - RIsk of PONV score = 2. If > 2, anti-emetic intervention recommended    1. Severe aortic valve stenosis:   2. Acute on chronic diastolic heart failure, NYHA class II:  Patient reports progressive exertional dyspnea, found to have critical aortic stenosis with CL 0.52 and mean PG 68 mmHg with preserved LVEF. NT-BNP elevated 2518 today suggestive of acute on chronic heart failure. She has been evaluated by the our structural heart team and has been deemed an appropriate candidate for transcatheter aortic valve replacement. We discussed the procedure in detail, including pre and post-procedure care, restrictions and follow-up.     All questions answered  Type and screen orders complete  Supplies for scrubbing provided  No known contrast dye allergy   No trouble with anesthesia in the past  Labs today pending, no s/s of infection    3. CAD:   4. HTN:  5. HLD:  Pre-TAVR coronary angiogram showed mild CAD. She denies angina. Plan to continue Coreg  and statin therapy. Holding losartan day of procedure.     Medication Recommendations:  Acei/ARB: Hold losartan morning of procedure  Antiplatelet: Continue  day before and morning of perioperatively   BB: Continue perioperatively without interruption  Supplements: Hold morning of procedure    Patient is optimized and is acceptable candidate for the proposed procedure.  No further diagnostic evaluation is needed. Pre-procedure instructions provided in written format.     JUAN CARLOS Andrade, CNP  Structural Heart Care Nurse Practitioner  [facility] [place] [facility]  Clinic: [phone]  Pager: [phone]

## 2023-10-23 NOTE — PROGRESS NOTES
STRUCTURAL HEART CLINIC  H&P    Referring Provider: Dr. Leyva   Primary Cardiologist: Dr. Orellana    History of Present Illness  Jenna Kaur is a 77 year old female who presents for pre-operative H&P in preparation for transcatheter aortic valve replacement on 10/25/23 at [facility].     Patient with a history of moderate aortic stenosis, recently progressed to severe characterized with an LC of 0.52 cm2, mean gradient 68 mmHg and peak velocity of 5.3 m/sec with LVEF 55-60%, DI 0.16 by echocardiogram on 9/20/2023. She reports symptoms of exertional dyspnea. Patient was evaluated in structural heart clinic where she was deemed an appropriate TAVR candidate. She was counseled for the above procedure.      Additional notable medical history of HTN, HLD, hypothyroidism, mild CAD pre TAVR cath, acute on chronic diastolic heart failure.     Feeling well, anxious but otherwise okay. No new cardiac symptoms. No infectious symptoms.      History of blood transfusions/reactions: No  History of abnormal bleeding/anti-platelet use: No  Steroid use in the last year: No  Personal or family history with difficulty with anesthesia: No   Infection precautions: No  Difficulty w/intubation/bedrest: No    Current Medications:  Current Outpatient Medications   Medication Sig Dispense Refill    aspirin 81 MG EC tablet Take 81 mg by mouth daily      Calcium Carbonate-Vitamin D (CALCIUM 500 + D PO) Take  by mouth daily. Takes 2 tablets      carvedilol (COREG) 25 MG tablet Take 1 tablet (25 mg) by mouth 2 times daily (with meals) 180 tablet 3    levothyroxine (SYNTHROID/LEVOTHROID) 112 MCG tablet Take 1 tablet (112 mcg) by mouth daily 90 tablet 3    losartan (COZAAR) 50 MG tablet Take 1 tablet (50 mg) by mouth daily 90 tablet 3    polyethylene glycol (MIRALAX/GLYCOLAX) powder as needed      simvastatin (ZOCOR) 10 MG tablet Take 1 tablet (10 mg) by mouth At Bedtime 90 tablet 3       Allergies:      Allergies   Allergen Reactions    Erythromycin GI Disturbance     Nausea and sick feeling       Past Medical History:  Past Medical History:   Diagnosis Date    Heart disease     Hypertension        Past Surgical History:  Past Surgical History:   Procedure Laterality Date    ABDOMEN SURGERY      CHOLECYSTECTOMY      COLONOSCOPY      CV CORONARY ANGIOGRAM N/A 10/2/2023    Procedure: Coronary Angiogram;  Surgeon: Garett Real MD;  Location: [facility] CARDIAC CATH LAB    CV PCI N/A 10/2/2023    Procedure: Percutaneous Coronary Intervention;  Surgeon: Garett Real MD;  Location: [facility] CARDIAC CATH LAB    EYE SURGERY  Cataract surgery 11/2 & 11/16/2021       Family History:  Family History   Problem Relation Age of Onset    Breast Cancer Mother 70    Colon Cancer Father 59    Arrhythmia Brother     Sleep Apnea Brother        Social History:  Social History     Socioeconomic History    Marital status:    Tobacco Use    Smoking status: Never    Smokeless tobacco: Never   Vaping Use    Vaping Use: Never used   Substance and Sexual Activity    Alcohol use: Never    Drug use: Never    Sexual activity: Never   Other Topics Concern    Parent/sibling w/ CABG, MI or angioplasty before 65F 55M? No       Review of Systems:    Functional status: Independent in ADL's. Able to achieve METS > 4.    Constitutional: No fever, chills, or sweats. No weight gain/loss   ENT: No visual disturbance, ear ache, epistaxis, sore throat  Allergies/Immunologic: Negative.   Respiratory: No cough, hemoptysia  Cardiovascular: As per HPI  GI: No nausea, vomiting, hematemesis, melena, or hematochezia  : No urinary frequency, dysuria, or hematuria  Integument: Negative  Psychiatric: Negative  Neuro: Negative  Endocrinology: Negative   Musculoskeletal: Negative      Physical Exam:  Vitals: BP (!) 158/86 (BP Location: Right arm, Patient Position: Chair, Cuff Size: Adult Regular)   Pulse 64   Wt 87.8 kg (193 lb 9.6 oz)    SpO2 96%   BMI 32.22 kg/m     General: NAD  HEENT:  Dentition intact.    Neck: No jugular venous distension.   Heart: RRR with 3/6 JESUS ALBERTO at RUSB  Lungs: CTA.    Abdomen: Soft, nontender, nondistended.   Extremities: No clubbing, cyanosis, or edema.  The pulses are +4/4 at the radial, brachial, femoral, popliteal, DP, and PT sites bilaterally.  No bruits are noted.  Neurologic: Alert and oriented to person/place/time, normal speech, gait and affect  Skin: No petechiae, purpura or rash.    Diagnostic Studies:    ECG:  Personally reviewed and interpreted by me.  NSR occasional PVCs and RBBB    ECHO 9/20/23  Interpretation Summary     Very severe aortic stenosis (PV 5.4 m/s MG 68 mmHg DVI 0.15 CL 0.52 cm2 SVI  37 mL/m2.) Recommend structural cardiology (valve clinic) referral.     Left and right ventricular function is normal.The LV ejection fraction is 55-  60%. No regional wall motion abnormalities are seen.  This study was compared with the study from 8/15/22: Aortic stenosis has  worsened significantly and is now very severe.  ______________________________________________________________________________  Left Ventricle  Left ventricular function is normal.The ejection fraction is 55-60%. Left  ventricular size is normal. Relative wall thickness is increased consistent  with concentric remodeling. Left ventricular diastolic function is abnormal.  Grade II or moderate diastolic dysfunction. Diastolic Doppler findings (E/E'  ratio and/or other parameters) suggest left ventricular filling pressures are  increased. No regional wall motion abnormalities are seen.     Right Ventricle  Right ventricular function, chamber size, wall motion, and thickness are  normal.     Atria  The right atria appears normal. Severe left atrial enlargement is present.     Mitral Valve  The mitral valve is normal.     Aortic Valve  Moderate aortic valve calcification is present. Severe aortic stenosis is  present. The calculated aortic valve  are is 0.52 cm^2. The mean AoV pressure  gradient is 67.9 mmHg. The peak aortic velocity is 5.38 m/sec. The V2/V1 ratio  is 0.16.     Tricuspid Valve  The tricuspid valve is normal. The peak velocity of the tricuspid regurgitant  jet is not obtainable.     Pulmonic Valve  The valve leaflets are not well visualized. On Doppler interrogation, there is  no significant stenosis or regurgitation.     Vessels  The aorta root is normal. The thoracic aorta is normal. The inferior vena cava  was normal in size with preserved respiratory variability. IVC diameter <2.1  cm collapsing >50% with sniff suggests a normal RA pressure of 3 mmHg.     Pericardium  No pericardial effusion is present.     Compared to Previous Study  This study was compared with the study from 8/15/22 . Aortic stenosis has  worsened significantly and is now very severe.     Attestation  I have personally viewed the imaging and agree with the interpretation and  report as documented by the fellow, Riaz Oakley, and/or edited by me.  ________________________________________________    CT TAVR 9/25/23    FINDINGS:     1.  Bicuspid aortic valve with fusion of the left and right coronary  cusp, Sue type 1. The aortic valve calcium score is 3067.  Planimeter valve area is 1.2 sq cm, consistent with severe stenosis.  Though the aortic cusps are severely calcified, the  raphe is minimally calcified. The LVOT is not calcified. The ascending  aorta is not calcified, aortic arch is  mildly calcified, the  descending thoracic aorta is mildly calcified. There is no mitral  annular calcification.  1.  There are minimal adverse aortic root features, ie coronary  heights are adequate, there is minimal aortic root calcification, and  raphe is minimally calcified.  2.  There are severe native coronary calcifications.   3.  The arch vessel branching pattern is normal.   4.  The suprarenal abdominal aorta is mildly calcified; infrarenal  abdominal aorta is moderately  calcified  5.  Widely patent origins of celiac trunk, superior mesenteric artery  and inferior mesenteric artery.  Single bilateral renal arteries with  widely patent origins.   7.  Femoral artery access site: no significant tortuosity or  calcification.   8. There is no acute aortic pathology, such as dissection, intramural  hematoma, or contained rupture.      MEASUREMENTS:   Representative dimensions of the thoracoabdominal aorta are as  follows:     1. AORTIC ANNULUS MEASUREMENTS:     1.  The average aortic annulus diameter is 29.3 mm, (long diameter is  23.4 mm and short diameter is 23.4 mm)  2.  Aortic annulus area is 5.41 cm2  3.  Aortic annulus perimeter is 84.2 mm  4.  Suggested 3-cusp coaxial angle for aortic valve is (Mongolian 16 , CAU  9) and alternate A-P coaxial angle is (BARRIOS 0 , CAU 37), these angles  will vary depending upon the patient's body position  5.  Aortic root angle is 60.7 degrees  6.  Left main - Annulus distance: 12.2 mm, RCA - Annulus distance: 18  mm     2. LVOT MEASUREMENTS:     1.  The average LVOT diameter (measured 4 mm below annular plane) is  26.4 mm  2.  LVOT area is 5.46 cm2  3.  LVOT perimeter is 84.5 mm     3. AORTA MEASUREMENTS     1.  The aortic root at the sinuses of Valsalva: 31.3 mm x  27.2 mm x  34.6 mm  2.  The sinotubular junction:  32.2 mm x 31.1 mm  3.  Sinotubular junction height: 17 mm x 16 mm  4.  Proximal ascending aorta: 37.5 mm x 35.7 mm  5.  Distal abdominal aorta proximal to the bifurcation: 15.8 mm x 15.5  mm  6.  Innominate artery 3 cm from ostium: 11.1 mm x 9.9 mm  7.  Left common carotid artery 3 cm from ostium: 6.9 mm x 6.5 mm     4. ILIOFEMORAL MEASUREMENTS:     RIGHT:  1.  Right common iliac artery: 10.6 mm x 9.3 mm   2.  Right external iliac artery: 8.9 mm x 7.7 mm   3.  Right common femoral artery: 9.3 mm x 8.7 mm   4.  Tortuosity: mild   5.  Calcification: mild      LEFT:  1.  Left common iliac artery: 9.9 mm x  8.4 mm  2.  Left external iliac artery:  "8.0 mm x 7.3 mm  3.  Left common femoral artery: 9.2 mm x 7.9 mm  4.  Tortuosity: mild   5.  Calcification: mild      OTHER FINDINGS:   1.  Please review the separate Radiology report for incidental  noncardiac findings.     I have personally reviewed the examination and initial interpretation  and I agree with the findings.     RAMAKRISHNA WITT MD     Laboratory Studies:  Personally reviewed and interpreted by me.    LIPID RESULTS:  Lab Results   Component Value Date    CHOL 153 02/22/2023    CHOL 162 12/11/2020    HDL 69 02/22/2023    HDL 65 12/11/2020    LDL 64 02/22/2023    LDL 76 12/11/2020    TRIG 102 02/22/2023    TRIG 106 12/11/2020       LIVER ENZYME RESULTS:  No results found for: \"AST\", \"ALT\"    CBC RESULTS:  Lab Results   Component Value Date    WBC 8.4 10/02/2023    WBC 7.1 11/01/2018    RBC 4.62 10/02/2023    RBC 4.61 11/01/2018    HGB 14.0 10/02/2023    HGB 14.8 10/02/2023    HGB 14.3 11/01/2018    HCT 42.7 10/02/2023    HCT 42.5 11/01/2018    MCV 92 10/02/2023    MCV 92 11/01/2018    MCH 32.0 10/02/2023    MCH 31.0 11/01/2018    MCHC 34.7 10/02/2023    MCHC 33.6 11/01/2018    RDW 12.0 10/02/2023    RDW 12.2 11/01/2018     10/02/2023     11/01/2018       BMP RESULTS:  Lab Results   Component Value Date     10/02/2023     02/10/2021    POTASSIUM 5.3 10/02/2023    POTASSIUM 4.1 02/22/2023    POTASSIUM 4.5 02/10/2021    CHLORIDE 105 10/02/2023    CHLORIDE 111 (H) 02/22/2023    CHLORIDE 105 02/10/2021    CO2 26 10/02/2023    CO2 28 02/22/2023    CO2 31 02/10/2021    ANIONGAP 12 10/02/2023    ANIONGAP 5 02/22/2023    ANIONGAP 3 02/10/2021     (H) 10/02/2023     (H) 02/22/2023     (H) 02/10/2021    BUN 18.5 10/02/2023    BUN 25 02/22/2023    BUN 23 02/10/2021    CR 1.04 (H) 10/02/2023    CR 1.00 02/10/2021    GFRESTIMATED 54 (L) 10/02/2023    GFRESTIMATED 54 (L) 02/10/2021    GFRESTBLACK 63 02/10/2021    JOSE 10.0 10/02/2023    JOSE 9.1 02/10/2021        A1C " RESULTS:  Lab Results   Component Value Date    A1C 5.2 01/31/2022    A1C 5.4 11/01/2018       INR RESULTS:  Lab Results   Component Value Date    INR 1.15 10/02/2023       Assessment and Plan   Jenna Kaur is a 77 year old female who presents for pre-operative H&P in preparation for transcatheter aortic valve replacement on 10/25/23 at [facility].     She has the following specific operative considerations:      - RCRI score 1 corresponding with a 1% perioperative risk of MACE      - ESTHER # of risks 2/8      - VTE risk = 2. If equal to or > 4, pharmacologic prophylaxis is indicated      - RIsk of PONV score = 2. If > 2, anti-emetic intervention recommended    1. Severe aortic valve stenosis:   2. Acute on chronic diastolic heart failure, NYHA class II:  Patient reports progressive exertional dyspnea, found to have critical aortic stenosis with CL 0.52 and mean PG 68 mmHg with preserved LVEF. NT-BNP elevated 2518 today suggestive of acute on chronic heart failure. She has been evaluated by the our structural heart team and has been deemed an appropriate candidate for transcatheter aortic valve replacement. We discussed the procedure in detail, including pre and post-procedure care, restrictions and follow-up.     All questions answered  Type and screen orders complete  Supplies for scrubbing provided  No known contrast dye allergy   No trouble with anesthesia in the past  Labs today pending, no s/s of infection    3. CAD:   4. HTN:  5. HLD:  Pre-TAVR coronary angiogram showed mild CAD. She denies angina. Plan to continue Coreg and statin therapy. Holding losartan day of procedure.     Medication Recommendations:  Acei/ARB: Hold losartan morning of procedure  Antiplatelet: Continue  day before and morning of perioperatively   BB: Continue perioperatively without interruption  Supplements: Hold morning of procedure    Patient is optimized and is acceptable candidate for the  proposed procedure.  No further diagnostic evaluation is needed. Pre-procedure instructions provided in written format.     JUAN CARLOS Andrade, CNP  Structural Heart Care Nurse Practitioner  [facility]  Clinic: [phone]  Pager: [phone]

## 2023-10-23 NOTE — NURSING NOTE
Chief Complaint   Patient presents with    Follow Up      H&P for TAVR procedure on 10/25       Vitals were taken, medications reconciled, and EKG was performed.    Keo Reveles EMT  10:40 AM

## 2023-10-24 ENCOUNTER — ANESTHESIA EVENT (OUTPATIENT)
Dept: SURGERY | Facility: CLINIC | Age: 78
DRG: 266 | End: 2023-10-24
Payer: COMMERCIAL

## 2023-10-24 LAB
ATRIAL RATE - MUSE: 62 BPM
DIASTOLIC BLOOD PRESSURE - MUSE: NORMAL MMHG
INTERPRETATION ECG - MUSE: NORMAL
P AXIS - MUSE: 39 DEGREES
PR INTERVAL - MUSE: 162 MS
QRS DURATION - MUSE: 126 MS
QT - MUSE: 492 MS
QTC - MUSE: 499 MS
R AXIS - MUSE: -60 DEGREES
SYSTOLIC BLOOD PRESSURE - MUSE: NORMAL MMHG
T AXIS - MUSE: 27 DEGREES
VENTRICULAR RATE- MUSE: 62 BPM

## 2023-10-24 NOTE — ANESTHESIA PREPROCEDURE EVALUATION
Anesthesia Pre-Procedure Evaluation    Patient: Jenna Kaur   MRN: [id number] : 1945        Procedure : Procedure(s):  Transcatheter aortic valve replacement with any indicated procedure. ([place])  OR TRANSCATHETER AORTIC VALVE REPLACEMENT, OPEN FEMORAL ARTERY APPROACH- [place]          Past Medical History:   Diagnosis Date    Heart disease     Hypertension       Past Surgical History:   Procedure Laterality Date    ABDOMEN SURGERY      CHOLECYSTECTOMY      COLONOSCOPY      CV CORONARY ANGIOGRAM N/A 10/2/2023    Procedure: Coronary Angiogram;  Surgeon: Garett Real MD;  Location: [facility] CARDIAC CATH LAB    CV PCI N/A 10/2/2023    Procedure: Percutaneous Coronary Intervention;  Surgeon: Garett Real MD;  Location: [facility] CARDIAC CATH LAB    EYE SURGERY  Cataract surgery  & 2021      Allergies   Allergen Reactions    Erythromycin GI Disturbance     Nausea and sick feeling      Social History     Tobacco Use    Smoking status: Never    Smokeless tobacco: Never   Substance Use Topics    Alcohol use: Never      Wt Readings from Last 1 Encounters:   10/23/23 87.8 kg (193 lb 9.6 oz)        Anesthesia Evaluation   Pt has had prior anesthetic. Type: General.        ROS/MED HX  ENT/Pulmonary:  - neg pulmonary ROS     Neurologic:  - neg neurologic ROS     Cardiovascular:     (+) Dyslipidemia hypertension- -   -  - -                           valvular problems/murmurs (Severe AS) type: AS     Previous cardiac testing   Echo: Date: 23 Results:  Interpretation Summary     Very severe aortic stenosis (PV 5.4 m/s MG 68 mmHg DVI 0.15 CL 0.52 cm2 SVI  37 mL/m2.) Recommend structural cardiology (valve clinic) referral.     Left and right ventricular function is normal.The LV ejection fraction is 55-  60%. No regional wall motion abnormalities are seen.  This study was compared with the study from 8/15/22: Aortic stenosis has  worsened significantly and is now very severe.    Stress  "Test:  Date: Results:    ECG Reviewed:  Date: Results:    Cath:  Date: 10/2/23 Results:  1. Minimal nonobstructive CAD  2.    Right dominant circulation   Congenital heart disease: 9/20/23.   METS/Exercise Tolerance: >4 METS    Hematologic:  - neg hematologic  ROS     Musculoskeletal:  - neg musculoskeletal ROS     GI/Hepatic:  - neg GI/hepatic ROS     Renal/Genitourinary:  - neg Renal ROS     Endo:     (+)          thyroid problem, hypothyroidism,           Psychiatric/Substance Use:       Infectious Disease:  - neg infectious disease ROS     Malignancy:  - neg malignancy ROS     Other:               OUTSIDE LABS:  CBC:   Lab Results   Component Value Date    WBC 6.9 10/23/2023    WBC 8.4 10/02/2023    HGB 14.8 10/23/2023    HGB 14.0 10/02/2023    HCT 42.7 10/23/2023    HCT 42.7 10/02/2023     10/23/2023     10/02/2023     BMP:   Lab Results   Component Value Date     10/23/2023     10/02/2023    POTASSIUM 3.9 10/23/2023    POTASSIUM 5.3 10/02/2023    CHLORIDE 104 10/23/2023    CHLORIDE 105 10/02/2023    CO2 28 10/23/2023    CO2 26 10/02/2023    BUN 13.2 10/23/2023    BUN 18.5 10/02/2023    CR 0.86 10/23/2023    CR 1.04 (H) 10/02/2023     (H) 10/23/2023     (H) 10/02/2023     COAGS:   Lab Results   Component Value Date    INR 1.20 (H) 10/23/2023     POC: No results found for: \"BGM\", \"HCG\", \"HCGS\"  HEPATIC: No results found for: \"ALBUMIN\", \"PROTTOTAL\", \"ALT\", \"AST\", \"GGT\", \"ALKPHOS\", \"BILITOTAL\", \"BILIDIRECT\", \"JUANITA\"  OTHER:   Lab Results   Component Value Date    A1C 5.2 01/31/2022    JOSE 9.8 10/23/2023    TSH 0.33 (L) 02/22/2023    T4 1.35 02/22/2023       Anesthesia Plan    ASA Status:  4    NPO Status:  NPO Appropriate    Anesthesia Type: MAC.     - Reason for MAC: chronic cardiopulmonary disease, immobility needed, straight local not clinically adequate   Induction: Intravenous.   Maintenance: TIVA.   Techniques and Equipment:     - Lines/Monitors: 2nd IV, Arterial Line, " Central Line     - Drips/Meds: Phenylephrine     Consents            Postoperative Care    Pain management: IV analgesics, Oral pain medications, Multi-modal analgesia.   PONV prophylaxis: Ondansetron (or other 5HT-3), Dexamethasone or Solumedrol     Comments:                Timothy Benavides MD

## 2023-10-25 ENCOUNTER — ANESTHESIA (OUTPATIENT)
Dept: SURGERY | Facility: CLINIC | Age: 78
DRG: 266 | End: 2023-10-25
Payer: COMMERCIAL

## 2023-10-25 ENCOUNTER — HOSPITAL ENCOUNTER (INPATIENT)
Facility: CLINIC | Age: 78
LOS: 1 days | Discharge: HOME | DRG: 266 | End: 2023-10-26
Attending: INTERNAL MEDICINE | Admitting: INTERNAL MEDICINE
Payer: COMMERCIAL

## 2023-10-25 ENCOUNTER — HOSPITAL ENCOUNTER (OUTPATIENT)
Dept: CARDIOLOGY | Facility: CLINIC | Age: 78
Discharge: HOME | DRG: 266 | End: 2023-10-25
Attending: INTERNAL MEDICINE | Admitting: INTERNAL MEDICINE
Payer: COMMERCIAL

## 2023-10-25 DIAGNOSIS — I35.0 AORTIC STENOSIS, SEVERE: ICD-10-CM

## 2023-10-25 DIAGNOSIS — I35.0 SEVERE AORTIC STENOSIS: ICD-10-CM

## 2023-10-25 DIAGNOSIS — Z95.2 S/P TAVR (TRANSCATHETER AORTIC VALVE REPLACEMENT): Primary | ICD-10-CM

## 2023-10-25 LAB
ACT BLD: 118 SECONDS (ref 74–150)
ACT BLD: 263 SECONDS (ref 74–150)
ACT BLD: 280 SECONDS (ref 74–150)
ANION GAP SERPL CALCULATED.3IONS-SCNC: 9 MMOL/L (ref 7–15)
BLD PROD TYP BPU: NORMAL
BLOOD COMPONENT TYPE: NORMAL
BUN SERPL-MCNC: 12.4 MG/DL (ref 8–23)
CALCIUM SERPL-MCNC: 9 MG/DL (ref 8.8–10.2)
CHLORIDE SERPL-SCNC: 107 MMOL/L (ref 98–107)
CODING SYSTEM: NORMAL
CREAT SERPL-MCNC: 0.79 MG/DL (ref 0.51–0.95)
CROSSMATCH: NORMAL
DEPRECATED HCO3 PLAS-SCNC: 26 MMOL/L (ref 22–29)
EGFRCR SERPLBLD CKD-EPI 2021: 76 ML/MIN/1.73M2
GLUCOSE BLDC GLUCOMTR-MCNC: 123 MG/DL (ref 70–99)
GLUCOSE SERPL-MCNC: 103 MG/DL (ref 70–99)
ISSUE DATE AND TIME: NORMAL
ISSUE DATE AND TIME: NORMAL
MAGNESIUM SERPL-MCNC: 1.9 MG/DL (ref 1.7–2.3)
POTASSIUM SERPL-SCNC: 4 MMOL/L (ref 3.4–5.3)
SODIUM SERPL-SCNC: 142 MMOL/L (ref 135–145)
UNIT ABO/RH: NORMAL
UNIT NUMBER: NORMAL
UNIT STATUS: NORMAL
UNIT TYPE ISBT: 5100

## 2023-10-25 PROCEDURE — 272N000001 HC OR GENERAL SUPPLY STERILE: Performed by: INTERNAL MEDICINE

## 2023-10-25 PROCEDURE — 33361 REPLACE AORTIC VALVE PERQ: CPT | Performed by: INTERNAL MEDICINE

## 2023-10-25 PROCEDURE — 83735 ASSAY OF MAGNESIUM: CPT | Performed by: NURSE PRACTITIONER

## 2023-10-25 PROCEDURE — 02RF38Z REPLACEMENT OF AORTIC VALVE WITH ZOOPLASTIC TISSUE, PERCUTANEOUS APPROACH: ICD-10-PCS | Performed by: INTERNAL MEDICINE

## 2023-10-25 PROCEDURE — 272N000085 HC PACK CELL SAVER CSP: Performed by: INTERNAL MEDICINE

## 2023-10-25 PROCEDURE — 250N000009 HC RX 250: Performed by: STUDENT IN AN ORGANIZED HEALTH CARE EDUCATION/TRAINING PROGRAM

## 2023-10-25 PROCEDURE — C1769 GUIDE WIRE: HCPCS | Performed by: INTERNAL MEDICINE

## 2023-10-25 PROCEDURE — 250N000011 HC RX IP 250 OP 636: Mod: JZ | Performed by: STUDENT IN AN ORGANIZED HEALTH CARE EDUCATION/TRAINING PROGRAM

## 2023-10-25 PROCEDURE — 36415 COLL VENOUS BLD VENIPUNCTURE: CPT | Performed by: NURSE PRACTITIONER

## 2023-10-25 PROCEDURE — 250N000013 HC RX MED GY IP 250 OP 250 PS 637: Performed by: STUDENT IN AN ORGANIZED HEALTH CARE EDUCATION/TRAINING PROGRAM

## 2023-10-25 PROCEDURE — 120N000003 HC R&B IMCU UMMC

## 2023-10-25 PROCEDURE — 250N000011 HC RX IP 250 OP 636: Performed by: STUDENT IN AN ORGANIZED HEALTH CARE EDUCATION/TRAINING PROGRAM

## 2023-10-25 PROCEDURE — 250N000009 HC RX 250: Performed by: INTERNAL MEDICINE

## 2023-10-25 PROCEDURE — 86923 COMPATIBILITY TEST ELECTRIC: CPT | Performed by: INTERNAL MEDICINE

## 2023-10-25 PROCEDURE — 250N000011 HC RX IP 250 OP 636: Performed by: INTERNAL MEDICINE

## 2023-10-25 PROCEDURE — C1894 INTRO/SHEATH, NON-LASER: HCPCS | Performed by: INTERNAL MEDICINE

## 2023-10-25 PROCEDURE — 250N000013 HC RX MED GY IP 250 OP 250 PS 637: Performed by: NURSE PRACTITIONER

## 2023-10-25 PROCEDURE — P9016 RBC LEUKOCYTES REDUCED: HCPCS

## 2023-10-25 PROCEDURE — 272N000002 HC OR SUPPLY OTHER OPNP: Performed by: INTERNAL MEDICINE

## 2023-10-25 PROCEDURE — 85347 COAGULATION TIME ACTIVATED: CPT

## 2023-10-25 PROCEDURE — C1760 CLOSURE DEV, VASC: HCPCS | Performed by: INTERNAL MEDICINE

## 2023-10-25 PROCEDURE — 86923 COMPATIBILITY TEST ELECTRIC: CPT

## 2023-10-25 PROCEDURE — 370N000017 HC ANESTHESIA TECHNICAL FEE, PER MIN: Performed by: INTERNAL MEDICINE

## 2023-10-25 PROCEDURE — 250N000011 HC RX IP 250 OP 636: Mod: JZ | Performed by: INTERNAL MEDICINE

## 2023-10-25 PROCEDURE — 410N000003 HC PER-PERFUSION 1ST 30 MIN: Performed by: INTERNAL MEDICINE

## 2023-10-25 PROCEDURE — 93325 DOPPLER ECHO COLOR FLOW MAPG: CPT | Mod: 26 | Performed by: STUDENT IN AN ORGANIZED HEALTH CARE EDUCATION/TRAINING PROGRAM

## 2023-10-25 PROCEDURE — 258N000003 HC RX IP 258 OP 636: Performed by: STUDENT IN AN ORGANIZED HEALTH CARE EDUCATION/TRAINING PROGRAM

## 2023-10-25 PROCEDURE — 93321 DOPPLER ECHO F-UP/LMTD STD: CPT | Mod: 26 | Performed by: STUDENT IN AN ORGANIZED HEALTH CARE EDUCATION/TRAINING PROGRAM

## 2023-10-25 PROCEDURE — 93321 DOPPLER ECHO F-UP/LMTD STD: CPT

## 2023-10-25 PROCEDURE — 250N000011 HC RX IP 250 OP 636: Mod: JZ | Performed by: NURSE ANESTHETIST, CERTIFIED REGISTERED

## 2023-10-25 PROCEDURE — 93010 ELECTROCARDIOGRAM REPORT: CPT | Performed by: INTERNAL MEDICINE

## 2023-10-25 PROCEDURE — C1889 IMPLANT/INSERT DEVICE, NOC: HCPCS | Performed by: INTERNAL MEDICINE

## 2023-10-25 PROCEDURE — 99223 1ST HOSP IP/OBS HIGH 75: CPT | Mod: 25 | Performed by: NURSE PRACTITIONER

## 2023-10-25 PROCEDURE — 93005 ELECTROCARDIOGRAM TRACING: CPT

## 2023-10-25 PROCEDURE — 272N000088 HC PUMP APP ADULT PERFUSION: Performed by: INTERNAL MEDICINE

## 2023-10-25 PROCEDURE — C1725 CATH, TRANSLUMIN NON-LASER: HCPCS | Performed by: INTERNAL MEDICINE

## 2023-10-25 PROCEDURE — 93308 TTE F-UP OR LMTD: CPT | Mod: 26 | Performed by: STUDENT IN AN ORGANIZED HEALTH CARE EDUCATION/TRAINING PROGRAM

## 2023-10-25 PROCEDURE — 80048 BASIC METABOLIC PNL TOTAL CA: CPT | Performed by: NURSE PRACTITIONER

## 2023-10-25 PROCEDURE — 33361 REPLACE AORTIC VALVE PERQ: CPT | Mod: 62 | Performed by: STUDENT IN AN ORGANIZED HEALTH CARE EDUCATION/TRAINING PROGRAM

## 2023-10-25 DEVICE — VALVE AORTIC TRANSCATHETER HEART 26MM SAPIEN 3 ULTRA RESILIA: Type: IMPLANTABLE DEVICE | Status: FUNCTIONAL

## 2023-10-25 RX ORDER — LIDOCAINE 40 MG/G
CREAM TOPICAL
Status: DISCONTINUED | OUTPATIENT
Start: 2023-10-25 | End: 2023-10-25 | Stop reason: HOSPADM

## 2023-10-25 RX ORDER — ACETAMINOPHEN 325 MG/1
975 TABLET ORAL ONCE
Status: COMPLETED | OUTPATIENT
Start: 2023-10-25 | End: 2023-10-25

## 2023-10-25 RX ORDER — SODIUM CHLORIDE, SODIUM LACTATE, POTASSIUM CHLORIDE, CALCIUM CHLORIDE 600; 310; 30; 20 MG/100ML; MG/100ML; MG/100ML; MG/100ML
INJECTION, SOLUTION INTRAVENOUS CONTINUOUS
Status: DISCONTINUED | OUTPATIENT
Start: 2023-10-25 | End: 2023-10-25 | Stop reason: HOSPADM

## 2023-10-25 RX ORDER — LEVOTHYROXINE SODIUM 112 UG/1
112 TABLET ORAL DAILY
Status: DISCONTINUED | OUTPATIENT
Start: 2023-10-26 | End: 2023-10-26 | Stop reason: HOSPADM

## 2023-10-25 RX ORDER — SIMVASTATIN 10 MG
10 TABLET ORAL AT BEDTIME
Status: DISCONTINUED | OUTPATIENT
Start: 2023-10-25 | End: 2023-10-26 | Stop reason: HOSPADM

## 2023-10-25 RX ORDER — ASPIRIN 325 MG
325 TABLET ORAL DAILY
Status: DISCONTINUED | OUTPATIENT
Start: 2023-10-25 | End: 2023-10-25

## 2023-10-25 RX ORDER — HEPARIN SODIUM 1000 [USP'U]/ML
INJECTION, SOLUTION INTRAVENOUS; SUBCUTANEOUS PRN
Status: DISCONTINUED | OUTPATIENT
Start: 2023-10-25 | End: 2023-10-25

## 2023-10-25 RX ORDER — IOPAMIDOL 755 MG/ML
INJECTION, SOLUTION INTRAVASCULAR
Status: DISCONTINUED | OUTPATIENT
Start: 2023-10-25 | End: 2023-10-25 | Stop reason: HOSPADM

## 2023-10-25 RX ORDER — FENTANYL CITRATE 50 UG/ML
50 INJECTION, SOLUTION INTRAMUSCULAR; INTRAVENOUS EVERY 5 MIN PRN
Status: DISCONTINUED | OUTPATIENT
Start: 2023-10-25 | End: 2023-10-25 | Stop reason: HOSPADM

## 2023-10-25 RX ORDER — LOSARTAN POTASSIUM 25 MG/1
50 TABLET ORAL DAILY
Status: DISCONTINUED | OUTPATIENT
Start: 2023-10-25 | End: 2023-10-26 | Stop reason: HOSPADM

## 2023-10-25 RX ORDER — KETAMINE HYDROCHLORIDE 10 MG/ML
INJECTION INTRAMUSCULAR; INTRAVENOUS PRN
Status: DISCONTINUED | OUTPATIENT
Start: 2023-10-25 | End: 2023-10-25

## 2023-10-25 RX ORDER — SODIUM CHLORIDE, SODIUM LACTATE, POTASSIUM CHLORIDE, CALCIUM CHLORIDE 600; 310; 30; 20 MG/100ML; MG/100ML; MG/100ML; MG/100ML
INJECTION, SOLUTION INTRAVENOUS CONTINUOUS PRN
Status: DISCONTINUED | OUTPATIENT
Start: 2023-10-25 | End: 2023-10-25

## 2023-10-25 RX ORDER — MAGNESIUM OXIDE 400 MG/1
400 TABLET ORAL EVERY 4 HOURS
Status: COMPLETED | OUTPATIENT
Start: 2023-10-25 | End: 2023-10-25

## 2023-10-25 RX ORDER — ONDANSETRON 2 MG/ML
4 INJECTION INTRAMUSCULAR; INTRAVENOUS EVERY 30 MIN PRN
Status: DISCONTINUED | OUTPATIENT
Start: 2023-10-25 | End: 2023-10-25 | Stop reason: HOSPADM

## 2023-10-25 RX ORDER — FENTANYL CITRATE 50 UG/ML
25 INJECTION, SOLUTION INTRAMUSCULAR; INTRAVENOUS EVERY 5 MIN PRN
Status: DISCONTINUED | OUTPATIENT
Start: 2023-10-25 | End: 2023-10-25 | Stop reason: HOSPADM

## 2023-10-25 RX ORDER — HYDROMORPHONE HYDROCHLORIDE 1 MG/ML
0.4 INJECTION, SOLUTION INTRAMUSCULAR; INTRAVENOUS; SUBCUTANEOUS EVERY 5 MIN PRN
Status: DISCONTINUED | OUTPATIENT
Start: 2023-10-25 | End: 2023-10-25 | Stop reason: HOSPADM

## 2023-10-25 RX ORDER — PROTAMINE SULFATE 10 MG/ML
INJECTION, SOLUTION INTRAVENOUS PRN
Status: DISCONTINUED | OUTPATIENT
Start: 2023-10-25 | End: 2023-10-25

## 2023-10-25 RX ORDER — PROPOFOL 10 MG/ML
INJECTION, EMULSION INTRAVENOUS CONTINUOUS PRN
Status: DISCONTINUED | OUTPATIENT
Start: 2023-10-25 | End: 2023-10-25

## 2023-10-25 RX ORDER — CEFAZOLIN SODIUM/WATER 2 G/20 ML
2 SYRINGE (ML) INTRAVENOUS
Status: COMPLETED | OUTPATIENT
Start: 2023-10-25 | End: 2023-10-25

## 2023-10-25 RX ORDER — HYDROMORPHONE HYDROCHLORIDE 1 MG/ML
0.2 INJECTION, SOLUTION INTRAMUSCULAR; INTRAVENOUS; SUBCUTANEOUS EVERY 5 MIN PRN
Status: DISCONTINUED | OUTPATIENT
Start: 2023-10-25 | End: 2023-10-25 | Stop reason: HOSPADM

## 2023-10-25 RX ORDER — ONDANSETRON 4 MG/1
4 TABLET, ORALLY DISINTEGRATING ORAL EVERY 30 MIN PRN
Status: DISCONTINUED | OUTPATIENT
Start: 2023-10-25 | End: 2023-10-25 | Stop reason: HOSPADM

## 2023-10-25 RX ORDER — ACETAMINOPHEN 325 MG/1
650 TABLET ORAL EVERY 4 HOURS PRN
Status: DISCONTINUED | OUTPATIENT
Start: 2023-10-25 | End: 2023-10-26 | Stop reason: HOSPADM

## 2023-10-25 RX ORDER — HYDRALAZINE HYDROCHLORIDE 20 MG/ML
10 INJECTION INTRAMUSCULAR; INTRAVENOUS
Status: DISCONTINUED | OUTPATIENT
Start: 2023-10-25 | End: 2023-10-26 | Stop reason: HOSPADM

## 2023-10-25 RX ORDER — NITROGLYCERIN 0.4 MG/1
0.4 TABLET SUBLINGUAL EVERY 5 MIN PRN
Status: DISCONTINUED | OUTPATIENT
Start: 2023-10-25 | End: 2023-10-26 | Stop reason: HOSPADM

## 2023-10-25 RX ORDER — SODIUM CHLORIDE 9 MG/ML
INJECTION, SOLUTION INTRAVENOUS CONTINUOUS
Status: DISCONTINUED | OUTPATIENT
Start: 2023-10-25 | End: 2023-10-25 | Stop reason: HOSPADM

## 2023-10-25 RX ORDER — ASPIRIN 81 MG/1
81 TABLET ORAL DAILY
Status: DISCONTINUED | OUTPATIENT
Start: 2023-10-25 | End: 2023-10-25

## 2023-10-25 RX ORDER — ASPIRIN 81 MG/1
81 TABLET ORAL DAILY
Status: DISCONTINUED | OUTPATIENT
Start: 2023-10-26 | End: 2023-10-26 | Stop reason: HOSPADM

## 2023-10-25 RX ADMIN — HEPARIN SODIUM 2000 UNITS: 1000 INJECTION INTRAVENOUS; SUBCUTANEOUS at 08:44

## 2023-10-25 RX ADMIN — PHENYLEPHRINE HYDROCHLORIDE 200 MCG: 10 INJECTION INTRAVENOUS at 08:57

## 2023-10-25 RX ADMIN — PHENYLEPHRINE HYDROCHLORIDE 200 MCG: 10 INJECTION INTRAVENOUS at 08:38

## 2023-10-25 RX ADMIN — PHENYLEPHRINE HYDROCHLORIDE 200 MCG: 10 INJECTION INTRAVENOUS at 08:47

## 2023-10-25 RX ADMIN — Medication 10 MG: at 08:21

## 2023-10-25 RX ADMIN — PROTAMINE SULFATE 125 MG: 10 INJECTION, SOLUTION INTRAVENOUS at 09:11

## 2023-10-25 RX ADMIN — HYDROMORPHONE HYDROCHLORIDE 0.2 MG: 1 INJECTION, SOLUTION INTRAMUSCULAR; INTRAVENOUS; SUBCUTANEOUS at 10:30

## 2023-10-25 RX ADMIN — Medication 2 G: at 07:56

## 2023-10-25 RX ADMIN — PHENYLEPHRINE HYDROCHLORIDE 200 MCG: 10 INJECTION INTRAVENOUS at 08:34

## 2023-10-25 RX ADMIN — DEXMEDETOMIDINE HYDROCHLORIDE 0.5 MCG/KG/HR: 100 INJECTION, SOLUTION INTRAVENOUS at 07:53

## 2023-10-25 RX ADMIN — PROPOFOL 50 MCG/KG/MIN: 10 INJECTION, EMULSION INTRAVENOUS at 07:53

## 2023-10-25 RX ADMIN — ACETAMINOPHEN 650 MG: 325 TABLET, FILM COATED ORAL at 21:02

## 2023-10-25 RX ADMIN — ACETAMINOPHEN 975 MG: 325 TABLET, FILM COATED ORAL at 07:18

## 2023-10-25 RX ADMIN — HEPARIN SODIUM 13000 UNITS: 1000 INJECTION INTRAVENOUS; SUBCUTANEOUS at 08:31

## 2023-10-25 RX ADMIN — HYDROMORPHONE HYDROCHLORIDE 0.2 MG: 1 INJECTION, SOLUTION INTRAMUSCULAR; INTRAVENOUS; SUBCUTANEOUS at 10:45

## 2023-10-25 RX ADMIN — MAGNESIUM OXIDE TAB 400 MG (241.3 MG ELEMENTAL MG) 400 MG: 400 (241.3 MG) TAB at 21:01

## 2023-10-25 RX ADMIN — SODIUM CHLORIDE, POTASSIUM CHLORIDE, SODIUM LACTATE AND CALCIUM CHLORIDE: 600; 310; 30; 20 INJECTION, SOLUTION INTRAVENOUS at 07:40

## 2023-10-25 RX ADMIN — MAGNESIUM OXIDE TAB 400 MG (241.3 MG ELEMENTAL MG) 400 MG: 400 (241.3 MG) TAB at 14:42

## 2023-10-25 RX ADMIN — Medication 20 MG: at 07:53

## 2023-10-25 RX ADMIN — LOSARTAN POTASSIUM 50 MG: 25 TABLET, FILM COATED ORAL at 16:53

## 2023-10-25 RX ADMIN — SIMVASTATIN 10 MG: 10 TABLET, FILM COATED ORAL at 21:02

## 2023-10-25 ASSESSMENT — ACTIVITIES OF DAILY LIVING (ADL)
ADLS_ACUITY_SCORE: 22
ADLS_ACUITY_SCORE: 26
ADLS_ACUITY_SCORE: 22
ADLS_ACUITY_SCORE: 22
ADLS_ACUITY_SCORE: 20
ADLS_ACUITY_SCORE: 22
ADLS_ACUITY_SCORE: 26
ADLS_ACUITY_SCORE: 20
ADLS_ACUITY_SCORE: 22

## 2023-10-25 ASSESSMENT — VISUAL ACUITY: OU: BASELINE

## 2023-10-25 NOTE — ANESTHESIA POSTPROCEDURE EVALUATION
Patient: Jenna Kaur    Procedure: Procedure(s):  Transcatheter aortic valve replacement with any indicated procedure. ([place])  OR TRANSCATHETER AORTIC VALVE REPLACEMENT, OPEN FEMORAL ARTERY APPROACH- [place]       Anesthesia Type:  MAC    Note:  Disposition: Admission   Postop Pain Control: Uneventful            Sign Out: Well controlled pain   PONV: No   Neuro/Psych: Uneventful            Sign Out: Acceptable/Baseline neuro status   Airway/Respiratory: Uneventful            Sign Out: Acceptable/Baseline resp. status   CV/Hemodynamics: Uneventful            Sign Out: Acceptable CV status; No obvious hypovolemia; No obvious fluid overload   Other NRE: NONE   DID A NON-ROUTINE EVENT OCCUR? No    Event details/Postop Comments:  No complications.           Last vitals:  Vitals Value Taken Time   /54 10/25/23 0945   Temp 36.1  C (97  F) 10/25/23 0935   Pulse 58 10/25/23 0956   Resp 14 10/25/23 0956   SpO2 96 % 10/25/23 0956   Vitals shown include unfiled device data.    Electronically Signed By: Blayne Griffith MD  October 25, 2023  9:57 AM

## 2023-10-25 NOTE — DISCHARGE SUMMARY
[address] [address]  p: [phone]    Discharge Summary: Cardiology Service    Jenna Kaur MRN# [id number]   YOB: 1945 Age: 77 year old       Admission Date: 10/25/2023  Discharge Date: 10/26/2023    Discharge Diagnoses:  # Severe aortic stenosis  # s/p TAVR  # Hx RBBB  # Acute on chronic diastolic heart failure NYHA class II symptoms  # HTN  # HLD  # Hypothyroidism  # CKD stage II    Brief HPI:  Jenna Kaur is a 77 year old female with a history of CKD, HTN, HLD, heart failure with preserved ejection fraction, RBBB hypothyroidism and severe aortic stenosis who was admitted for planned TAVR. Course was uncomplicated and patient was discharged with Biotel heart monitor to monitor for conduction delays post TAVR.     Hospital Course by Diagnosis:  # Severe aortic stenosis  # s/p TAVR  # Hx RBBB  # Acute on chronic diastolic heart failure NYHA class II symptoms  Prior to procedure, pt noted to have a mean gradient 68 mmHG, CL 0.52 cm^2, PV 5.3 m/s. Now s/p TAVR with 26 mm Edward Jae valve. Procedure was uncomplicated. Post procedure MG 6 mm Hg. NT-BNP 2,518, LVEDP > 18, patient appears euvolemic.      - Groin sites: CDI, resolved hematoma, patient educated on lifting restrictions   - EKG POD 1: persistent RBBB, SB  - Echocardiogram POD 1: well seated TAVR valve, MG 9 mmHg, trace AI   - Aspirin 81 mg for anti-platelet therapy.   - Biotel cardiac monitor at discharge for history of RBBB and high risk conduction issues post TAVR. Biotel not covered by insurance, discussed with Dr. Leyva who recommends Zio patch as alternative     Chronic Issues:  # HTN: Hold PTA Coreg for concern for AVB post TAVR, continue PTA Losartan 50 mg daily  # HLD: continue PTA Simvastatin 10 mg daily  # Hypothyroidism: continue PTA Synthroid 112 mcg daily  # CKD stage II: baseline Crt 0.8-1, Crt on discharge 0.77    Pertinent Procedures:  1.  TAVR    Consults:  PT/OT  SW/RNCC    Medication Changes:  HOLD Coreg 25 mg BID     Discharge medications:   Current Discharge Medication List        CONTINUE these medications which have NOT CHANGED    Details   aspirin 81 MG EC tablet Take 81 mg by mouth daily      Calcium Carbonate-Vitamin D (CALCIUM 500 + D PO) Take  by mouth daily. Takes 2 tablets      carvedilol (COREG) 25 MG tablet Take 1 tablet (25 mg) by mouth 2 times daily (with meals)  Qty: 180 tablet, Refills: 3    Associated Diagnoses: Essential hypertension with goal blood pressure less than 140/90      levothyroxine (SYNTHROID/LEVOTHROID) 112 MCG tablet Take 1 tablet (112 mcg) by mouth daily  Qty: 90 tablet, Refills: 3    Associated Diagnoses: Hypothyroidism due to acquired atrophy of thyroid      losartan (COZAAR) 50 MG tablet Take 1 tablet (50 mg) by mouth daily  Qty: 90 tablet, Refills: 3    Associated Diagnoses: Essential hypertension with goal blood pressure less than 140/90      polyethylene glycol (MIRALAX/GLYCOLAX) powder as needed      simvastatin (ZOCOR) 10 MG tablet Take 1 tablet (10 mg) by mouth At Bedtime  Qty: 90 tablet, Refills: 3    Associated Diagnoses: Hyperlipidemia LDL goal <130             Follow-up:  Cherie RANGEL CNP 10/30/23    Labs or imaging requiring follow-up after discharge:  BMP, EKG    Code status:  FULL    Condition on discharge  Temp:  [97  F (36.1  C)-97.7  F (36.5  C)] 97.2  F (36.2  C)  Pulse:  [53-64] 53  Resp:  [11-19] 18  BP: ()/(48-82) 106/48  SpO2:  [96 %-99 %] 98 %  General: Alert, interactive, no acute distress  HEENT: Normocephalic, atraumatic. Sclera anicteric.   Neck: JVP not elevated  Cardiovascular: Regular rate and rhythm, normal S1 and S2, no murmurs, gallops, or rubs. Radial and pedal pulses palpable bilaterally.   Resp: Regular work of breathing on room air. Clear to auscultation bilaterally, no rales, wheezes, or rhonchi  GI: Soft, nontender, nondistended.   Extremities: no edema, no  cyanosis or clubbing, warm and well perfused, Groin sites CDI  Skin: Warm and dry, no jaundice or rash  Neuro: Alert and oriented x 3. CN 2-12 intact, moves all extremities equally, normal speech  Psych: Mood and affect are appropriate    Imaging with results:  EKG 10/26/23 POD 1:      Echocardiogram 10/26/23 POD 1:  Interpretation Summary  TAVR with 26mm Catalan Jae 3 Resilia valve 10/25/23. The mean gradient  across the aortic valve is 9 mmHg. There is trace paravalvular regurgitation.  Global and regional left ventricular function is normal with an EF of 60-65%.  Global right ventricular function is normal.  IVC diameter <2.1 cm collapsing >50% with sniff suggests a normal RA pressure  of 3 mmHg.  No pericardial effusion is present.    TAVR 10/25/23:  Plan     Follow bedrest per protocol   Continued medical management and lifestyle modifications for cardiovascular risk factor optimizations.   Admit to inpatient        1. Aspirin 81 mg po daily lifelong.  2. Bedrest per protocol.  3. Admit to the primary inpatient team for further evaluation and management.  4. Echocardiogram tomorrow.   5. Lifelong antibiotic prophylaxis prior to all dental procedures.  6. Plan for ambulatory heart rhythm monitor due to baseline RBBB.         Recent Labs   Lab 10/25/23  0621 10/23/23  1125   NA  --  142   POTASSIUM  --  3.9   CHLORIDE  --  104   CO2  --  28   ANIONGAP  --  10   * 117*   BUN  --  13.2   CR  --  0.86   GFRESTIMATED  --  68   JOSE  --  9.8         Patient Care Team:  Kenna Garcia PA-C as PCP - General (Physician Assistant)  Kenna Garcia PA-C as Assigned PCP  BELLO Haas MD as MD (Cardiovascular Disease)  BELLO Haas MD as Assigned Heart and Vascular Provider    Time Spent on this Encounter   I, JUAN CARLOS Durant CNP, personally saw the patient today and spent greater than 30 minutes discharging this patient.    >30 minutes spent in discharge, including >50% in  counseling and coordination of care, medication review and plan of care recommended on follow up. Questions were answered.   It was our pleasure to care for Jenna Kaur during this hospitalization. Please do not hesitate to contact me should there be questions regarding the hospital course or discharge plan.    Patient discussed with staff cardiologist, Dr. Leyva, who agrees with the above documentation and plan. Documentation represents joint decision making.     JUAN CARLOS Brock, CNP  [facility]      Physician Attestation   I have reviewed and discussed with the advanced practice provider their history, physical and plan for Jenna Kaur. I did not participate in a shared visit by interviewing or examining the patient and this should be billed as an advanced practice provider only visit.    Colton Leyva MD  Interventional Cardiology

## 2023-10-25 NOTE — PLAN OF CARE
Arrived from PACU at 11:20 AM s/p TAVR    Via: wheelchair  Family: Aware of transfer, Silvia patient's niece present during transfer  Belongings: Received with pt  Chart: Received with pt  Medications: n/a  Code Status verified on armband: yes  2 RN Skin Assessment Completed By: Carlene BUENO and Aixa CASTELAN RN  Bed surface reassessed with algorithm and charted: n/a  New bed surface ordered: no  Suction/Ambu bag/Flowmeter at bedside: yes  Report received from: David in PACU  Pt status: Vitals stable, HR 50s, sinus goldie with RBBB and frequent PACs and PVC, groin sites WDL  ?  A/I : Monitored vitals and assessed pt status. A0x4, neuros q 2 hrs intact. Vitals stable on RA. Sinus brday/Normal sinus rhythm with RBBB with frequent PAC and PVCs, CSI notified, K and Mg checked, Mg replaced per protocol. Afebrile. Urinating adequately, has voided twice since procedure. Last bowel movement PTA. Tolerating regular diet now. Denies chest pain, palpitations, shortness of breath, nausea, dizziness. Groin sites WDL, CMS intact. Off bedrest at 1400, up with SBA. IV access: L and R PIV SL.     ?  P: Continue to monitor Pt status and report changes to CSI. Plan for ECHO and EKG tomorrow and cardiac rehab eval tomorrow.

## 2023-10-25 NOTE — PROGRESS NOTES
SPIRITUAL HEALTH SERVICES Progress Note  I met with pt this afternoon to introduce spiritual care and follow up on a spiritual care consult. Pt was confused as she spoke with me mentioning her grandfather in a way that did not seem connected. No family was present today. I will make a referral to my colleagues to have them contact family to assess any needs from them.       Shailesh Booker  Associate

## 2023-10-25 NOTE — OP NOTE
Cardiac Surgery - Operative Report    DATE OF SERVICE: 10/25/2023     PREOPERATIVE DIAGNOSES: Severe aortic stenosis.     POSTOPERATIVE DIAGNOSES: Severe aortic stenosis.     PROCEDURE PERFORMED: Transfemoral transcatheter aortic valve replacement      ATTENDING CARDIOLOGISTS:  Dr. Leyva    SURGEON: Michael Mulvihill, MD     ANESTHESIA:  Managed anesthesia care.     ESTIMATED BLOOD LOSS:  Minimal.     SPECIMEN:  None    Implant Name Type Inv. Item Serial No.  Lot No. LRB No. Used Action   VALVE AORTIC TRANSCATHETER HEART 26MM SHAQUILLE 3 ULTRA RESILIA - [id number] Valve VALVE AORTIC TRANSCATHETER HEART 26MM SHAQUILLE 3 ULTRA RESILIA [id number] [employer] [id number]  1 Implanted        INDICATIONS FOR PROCEDURE: 77F with severe aortic stenosis. She was evaluated by the multidisciplinary team. Transcatheter replacement was recommended. The patient understands the risks and benefits of the procedure and wishes to undergo the operation.     OPERATIVE FINDINGS:  There was no significant perivalvular leak after the procedure. Valve mean gradient was normal.    APPROACH: Right and left common femoral arteries, left common femoral vein    SHEATH: 14 Fr Catalan eSheath in the RCFA, 6Fr long sheath in the LCFA    CATHETERS: 6Fr angled pigtail, 6Fr AL-1     OPERATIVE DESCRIPTION IN DETAIL:  After obtaining informed consent, the patient was brought to the operating room and placed in the supine position on the operating room table.  Appropriate lines and devices for monitoring were placed by the anesthesia team.  The was sedated and prepped and draped before a timeout was performed to confirm the correct patient identity, as well as the procedure to be performed.      Balloon valvuloplasty was performed first Z Med 22mm balloon during rapid pacing     The valve was then successfully deployed with balloon expansion using final balloon volume of nominal+2 and at a depth of 80/20 position.     TTE: trace  catheter-associated valvular insufficiency    CLOSURE: the valve sheath access arteriotomy was successfully closed with the MANTA / perclose device. Hemostasis was excellent following manual compression    Michael Mulvihill, MD  10/25/2023 @ 1:32 PM

## 2023-10-25 NOTE — PHARMACY-ADMISSION MEDICATION HISTORY
Pharmacist Admission Medication History    Admission medication history is complete. The information provided in this note is only as accurate as the sources available at the time of the update.    Information Source(s): CareEverywhere/SureScripts, timing of last doses documented by RN    Changes made to PTA medication list:  Added: None  Deleted: None  Changed:   Calcium/vit D: dose to 2 tablets  Miralax: dose/frequency added    Medication Affordability: did not assess    Medication History Completed By:   Brittany Hardwick, AustinD, BCPS 10/25/2023 11:34 AM    PTA Med List   Medication Sig Last Dose    aspirin 81 MG EC tablet Take 81 mg by mouth daily 10/25/2023 at 0410    Calcium Carbonate-Vitamin D (CALCIUM 500 + D PO) Take 2 tablets by mouth daily Takes 2 tablets 10/24/2023 at 1800    carvedilol (COREG) 25 MG tablet Take 1 tablet (25 mg) by mouth 2 times daily (with meals) 10/25/2023 at 0410    levothyroxine (SYNTHROID/LEVOTHROID) 112 MCG tablet Take 1 tablet (112 mcg) by mouth daily 10/25/2023 at 0410    losartan (COZAAR) 50 MG tablet Take 1 tablet (50 mg) by mouth daily 10/24/2023 at 2100    polyethylene glycol (MIRALAX/GLYCOLAX) powder Take 17 g by mouth daily as needed Past Week    simvastatin (ZOCOR) 10 MG tablet Take 1 tablet (10 mg) by mouth At Bedtime 10/24/2023 at 2100

## 2023-10-25 NOTE — PROVIDER NOTIFICATION
Provider notified for symptomatic bradycardiac with HR down to 40. Patient diaphoretic, nauseous and BP 80/50s MAP 64, had been sitting up in chair at time and felt like she needed to have bowel movement. EKG obtained. Patient's symptom improved quickly. No further interventions ordered.

## 2023-10-25 NOTE — ANESTHESIA CARE TRANSFER NOTE
Patient: Jenna Kaur    Procedure: Procedure(s):  Transcatheter aortic valve replacement with any indicated procedure. (Catalan)  OR TRANSCATHETER AORTIC VALVE REPLACEMENT, OPEN FEMORAL ARTERY APPROACH- Hossein       Diagnosis: Severe aortic stenosis [I35.0]  Diagnosis Additional Information: No value filed.    Anesthesia Type:   MAC     Note:    Oropharynx: oropharynx clear of all foreign objects and spontaneously breathing  Level of Consciousness: awake and drowsy  Oxygen Supplementation: face mask  Level of Supplemental Oxygen (L/min / FiO2): 6  Independent Airway: airway patency satisfactory and stable  Dentition: dentition unchanged  Vital Signs Stable: post-procedure vital signs reviewed and stable  Report to RN Given: handoff report given  Patient transferred to: PACU    Handoff Report: Identifed the Patient, Identified the Reponsible Provider, Reviewed the pertinent medical history, Discussed the surgical course, Reviewed Intra-OP anesthesia mangement and issues during anesthesia, Set expectations for post-procedure period and Allowed opportunity for questions and acknowledgement of understanding      Vitals:  Vitals Value Taken Time   BP 84/64 10/25/23 0932   Temp     Pulse 62 10/25/23 0935   Resp     SpO2 96 % 10/25/23 0935   Vitals shown include unfiled device data.    Electronically Signed By: Timothy Benavides MD  October 25, 2023  9:36 AM

## 2023-10-26 ENCOUNTER — APPOINTMENT (OUTPATIENT)
Dept: OCCUPATIONAL THERAPY | Facility: CLINIC | Age: 78
DRG: 266 | End: 2023-10-26
Attending: NURSE PRACTITIONER
Payer: COMMERCIAL

## 2023-10-26 ENCOUNTER — APPOINTMENT (OUTPATIENT)
Dept: CARDIOLOGY | Facility: CLINIC | Age: 78
DRG: 266 | End: 2023-10-26
Attending: NURSE PRACTITIONER
Payer: COMMERCIAL

## 2023-10-26 ENCOUNTER — HOSPITAL ENCOUNTER (INPATIENT)
Dept: CARDIOLOGY | Facility: CLINIC | Age: 78
Discharge: HOME | DRG: 266 | End: 2023-10-26
Attending: NURSE PRACTITIONER | Admitting: INTERNAL MEDICINE
Payer: COMMERCIAL

## 2023-10-26 VITALS
RESPIRATION RATE: 18 BRPM | WEIGHT: 191.8 LBS | DIASTOLIC BLOOD PRESSURE: 82 MMHG | OXYGEN SATURATION: 90 % | HEART RATE: 76 BPM | HEIGHT: 65 IN | TEMPERATURE: 98 F | SYSTOLIC BLOOD PRESSURE: 146 MMHG | BODY MASS INDEX: 31.96 KG/M2

## 2023-10-26 DIAGNOSIS — Z95.2 S/P TAVR (TRANSCATHETER AORTIC VALVE REPLACEMENT): ICD-10-CM

## 2023-10-26 LAB
ANION GAP SERPL CALCULATED.3IONS-SCNC: 12 MMOL/L (ref 7–15)
BUN SERPL-MCNC: 14.9 MG/DL (ref 8–23)
CALCIUM SERPL-MCNC: 9.2 MG/DL (ref 8.8–10.2)
CHLORIDE SERPL-SCNC: 104 MMOL/L (ref 98–107)
CREAT SERPL-MCNC: 0.77 MG/DL (ref 0.51–0.95)
DEPRECATED HCO3 PLAS-SCNC: 23 MMOL/L (ref 22–29)
EGFRCR SERPLBLD CKD-EPI 2021: 78 ML/MIN/1.73M2
ERYTHROCYTE [DISTWIDTH] IN BLOOD BY AUTOMATED COUNT: 12.1 % (ref 10–15)
GLUCOSE SERPL-MCNC: 119 MG/DL (ref 70–99)
HCT VFR BLD AUTO: 38.1 % (ref 35–47)
HGB BLD-MCNC: 12.9 G/DL (ref 11.7–15.7)
LVEF ECHO: NORMAL
MAGNESIUM SERPL-MCNC: 2.2 MG/DL (ref 1.7–2.3)
MCH RBC QN AUTO: 31.5 PG (ref 26.5–33)
MCHC RBC AUTO-ENTMCNC: 33.9 G/DL (ref 31.5–36.5)
MCV RBC AUTO: 93 FL (ref 78–100)
PLATELET # BLD AUTO: 144 10E3/UL (ref 150–450)
POTASSIUM SERPL-SCNC: 3.8 MMOL/L (ref 3.4–5.3)
RBC # BLD AUTO: 4.09 10E6/UL (ref 3.8–5.2)
SODIUM SERPL-SCNC: 139 MMOL/L (ref 135–145)
WBC # BLD AUTO: 11.4 10E3/UL (ref 4–11)

## 2023-10-26 PROCEDURE — 93005 ELECTROCARDIOGRAM TRACING: CPT

## 2023-10-26 PROCEDURE — 93306 TTE W/DOPPLER COMPLETE: CPT | Mod: 26 | Performed by: INTERNAL MEDICINE

## 2023-10-26 PROCEDURE — 97535 SELF CARE MNGMENT TRAINING: CPT | Mod: GO

## 2023-10-26 PROCEDURE — 250N000013 HC RX MED GY IP 250 OP 250 PS 637: Performed by: INTERNAL MEDICINE

## 2023-10-26 PROCEDURE — 250N000011 HC RX IP 250 OP 636: Mod: JZ | Performed by: NURSE PRACTITIONER

## 2023-10-26 PROCEDURE — 36415 COLL VENOUS BLD VENIPUNCTURE: CPT | Performed by: NURSE PRACTITIONER

## 2023-10-26 PROCEDURE — 250N000013 HC RX MED GY IP 250 OP 250 PS 637: Performed by: NURSE PRACTITIONER

## 2023-10-26 PROCEDURE — 83735 ASSAY OF MAGNESIUM: CPT | Performed by: NURSE PRACTITIONER

## 2023-10-26 PROCEDURE — 97530 THERAPEUTIC ACTIVITIES: CPT | Mod: GO

## 2023-10-26 PROCEDURE — 93248 EXT ECG>7D<15D REV&INTERPJ: CPT | Performed by: INTERNAL MEDICINE

## 2023-10-26 PROCEDURE — 97165 OT EVAL LOW COMPLEX 30 MIN: CPT | Mod: GO

## 2023-10-26 PROCEDURE — 93246 EXT ECG>7D<15D RECORDING: CPT

## 2023-10-26 PROCEDURE — 80048 BASIC METABOLIC PNL TOTAL CA: CPT | Performed by: NURSE PRACTITIONER

## 2023-10-26 PROCEDURE — 93306 TTE W/DOPPLER COMPLETE: CPT

## 2023-10-26 PROCEDURE — 85027 COMPLETE CBC AUTOMATED: CPT | Performed by: NURSE PRACTITIONER

## 2023-10-26 PROCEDURE — 99239 HOSP IP/OBS DSCHRG MGMT >30: CPT | Mod: 25 | Performed by: NURSE PRACTITIONER

## 2023-10-26 PROCEDURE — 93010 ELECTROCARDIOGRAM REPORT: CPT | Mod: XU | Performed by: INTERNAL MEDICINE

## 2023-10-26 RX ORDER — POTASSIUM CHLORIDE 750 MG/1
20 TABLET, EXTENDED RELEASE ORAL ONCE
Qty: 2 TABLET | Refills: 0 | Status: COMPLETED | OUTPATIENT
Start: 2023-10-26 | End: 2023-10-26

## 2023-10-26 RX ADMIN — POTASSIUM CHLORIDE 20 MEQ: 750 TABLET, EXTENDED RELEASE ORAL at 08:21

## 2023-10-26 RX ADMIN — HYDRALAZINE HYDROCHLORIDE 10 MG: 20 INJECTION, SOLUTION INTRAMUSCULAR; INTRAVENOUS at 03:39

## 2023-10-26 RX ADMIN — ACETAMINOPHEN 650 MG: 325 TABLET, FILM COATED ORAL at 03:36

## 2023-10-26 RX ADMIN — ASPIRIN 81 MG: 81 TABLET ORAL at 08:21

## 2023-10-26 RX ADMIN — LEVOTHYROXINE SODIUM 112 MCG: 0.11 TABLET ORAL at 08:21

## 2023-10-26 ASSESSMENT — ACTIVITIES OF DAILY LIVING (ADL)
ADLS_ACUITY_SCORE: 28

## 2023-10-26 NOTE — CONSULTS
"SPIRITUAL HEALTH SERVICES  [facility]  ON-CALL VISIT     REFERRAL SOURCE: Follow-up from previous      Pt was awake, seated in a chair. Pt's niece was in the room. Pt stated \"I'm good today. I had a bad night. I was fine after surgery and then I got worse.\" Pt adds, \"I'm going home today, My niece is here to take me home. Pt had no spiritual health needs at this time.     PLAN: No further follow-up.     Rev. Jenna Abernathy MDiv, [facility]  Staff    Pager [phone]  * [facility] remains available 24/7 for emergent requests/referrals, either by having the switchboard page the on-call  or by entering an ASAP/STAT consult in Epic (this will also page the on-call ).*        "

## 2023-10-26 NOTE — PROGRESS NOTES
10/26/23 0900   Appointment Info   Signing Clinician's Name / Credentials (OT) Pola Evans, OTR/L   Living Environment   People in Home alone   Current Living Arrangements house   Home Accessibility stairs to enter home   Number of Stairs, Main Entrance 1   Stair Railings, Main Entrance none   Transportation Anticipated car, drives self   Self-Care   Usual Activity Tolerance good   Regular Exercise Yes   Activity/Exercise Type walking;strength training   Exercise Amount/Frequency 45 mins;3-5 times/wk   Equipment Currently Used at Home none   Fall history within last six months no   General Information   Onset of Illness/Injury or Date of Surgery 10/25/23   Referring Physician Cherie Barrera NP   Patient/Family Therapy Goal Statement (OT) Pt would like to return home independently.   Additional Occupational Profile Info/Pertinent History of Current Problem Jenna Kaur is a 77 year old female with a history of CKD, HTN, HLD, heart failure with preserved ejection fraction, RBBB hypothyroidism and severe aortic stenosis who was admitted for planned TAVR. Course was uncomplicated and patient was discharged with [employer] heart monitor to monitor for conduction delays post TAVR.   Existing Precautions/Restrictions fall;cardiac   Left Upper Extremity (Weight-bearing Status)   (10lbs)   Right Upper Extremity (Weight-bearing Status)   (10lbs)   Left Lower Extremity (Weight-bearing Status) full weight-bearing (FWB)   Right Lower Extremity (Weight-bearing Status) full weight-bearing (FWB)   Cognitive Status Examination   Orientation Status orientation to person, place and time   Visual Perception   Visual Impairment/Limitations WFL;corrective lenses full-time   Sensory   Sensory Quick Adds sensation intact   Pain Assessment   Patient Currently in Pain No   Range of Motion Comprehensive   Comment, General Range of Motion BUE AROM WFL.   Strength Comprehensive (MMT)   Comment, General Manual Muscle Testing (MMT) Assessment  Pt presents with generalized weakness post op.   Bed Mobility   Comment (Bed Mobility) SBA   Transfers   Transfer Comments SBA   Clinical Impression   Criteria for Skilled Therapeutic Interventions Met (OT) Yes, treatment indicated   OT Diagnosis Decreased tolerance for ADLs/IADLS.   OT Problem List-Impairments impacting ADL problems related to;activity tolerance impaired;strength;range of motion (ROM);post-surgical precautions   Assessment of Occupational Performance 1-3 Performance Deficits   Identified Performance Deficits Decreased tolerance for ADLS/IADLS.   Planned Therapy Interventions (OT) ADL retraining;ROM;strengthening;stretching;home program guidelines;progressive activity/exercise;risk factor education   Clinical Decision Making Complexity (OT) problem focused assessment/low complexity   Risk & Benefits of therapy have been explained evaluation/treatment results reviewed;care plan/treatment goals reviewed;patient   OT Total Evaluation Time   OT Eval, Low Complexity Minutes (05369) 10   OT Goals   Therapy Frequency (OT) Daily   OT Predicted Duration/Target Date for Goal Attainment 11/02/23   OT Discharge Planning   OT Plan OT: Ambulation, CR   OT Discharge Recommendation (DC Rec) home with assist;home with outpatient cardiac rehab   OT Rationale for DC Rec Pt is mobilizing near baseline. Pt will benefit from continued OP CR upon discharge.   OT Brief overview of current status SBA   Total Session Time   Total Session Time (sum of timed and untimed services) 10

## 2023-10-26 NOTE — PLAN OF CARE
DISCHARGE                         No discharge date for patient encounter.  ----------------------------------------------------------------------------  Discharged to: Home  Via: Automobile  Accompanied by: Family  Discharge Instructions: diet, activity, medications, follow up appointments, when to call the MD, aftercare instructions, and what to watchout for (i.e. s/s of infection, increasing SOB, palpitations, chest pain,)  Prescriptions: To be filled by home pharmacy per pt's request; medication list reviewed & sent with pt  Follow Up Appointments: arranged; information given  Belongings: All sent with pt  IV: out  Telemetry: off (home on heart monitor)  Pt exhibits understanding of above discharge instructions; all questions answered.    Discharge Paperwork: Signed, copied, and sent home with patient.

## 2023-10-26 NOTE — CONSULTS
Consult Acknowledged.     SW provided Updated ACP form that Pt plans to complete and bring to [facility] at next appointment for nothermelinda.     ________________    FAIZA Rausch, [address]   Beds 8124[address]6384  [facility]  Phone: [phone]  Pager: [phone]  Fax: [phone]

## 2023-10-26 NOTE — PROVIDER NOTIFICATION
Provider paged regarding development of hematoma in left groin, manual pressure applied with improvement in hematoma, patient supine in bed with HOB flat.

## 2023-10-26 NOTE — DISCHARGE INSTRUCTIONS
Patient Instructions - Going Home after TAVR:    It s normal to feel a little anxious after leaving the hospital and when fewer people are nearby. People do much better when they feel as though they have support- if you live alone we suggest you arrange to have someone stay with you for a day or two to help you recover.    Medications: Take all of your medications as directed in your discharge summary. Do not stop taking these medications without speaking with your cardiologist. If you have any questions about any of your medications, speak to your pharmacist or provider.    Follow up Appointments:    If you need to reschedule your appointment, please call:  [phone]  1 week follow-up with Cherie RANGEL CNP on 10/30/23 at [facility]  [facility]  Nga will call you with your follow up with Dr. Leyva or Dr. Real at 1 month post-TAVR  See your regular cardiologist in 6 months. Make an appointment once you get home. Your regular heart doctor will continue to be your heart specialist.  See your primary care provider in 2 weeks.  Make an appointment once you get home.  You will receive a temporary  heart valve  wallet card. We recommend that you keep it in your wallet or purse at all times. You will be receiving a permanent wallet card from the  within 6 months.  Before an MRI (magnetic resonance imaging) procedure, always notify the doctor (or medical technician) that you have an implanted heart valve.    Site Care: Shower every day- let the water run over your incision or access site, but do not rub the area. No tub baths, pools or hot-tubs for the 1st week you are at home. Do not put ointments, powders, or lotions on your access site and/or incision. It is normal to feel a small lump the size of a marble in the groin access site. That is the closure device used to close the vein. If you are experiencing discomfort, redness or increasing  swelling, please call us.    Dental Work: Avoid major dental work for the next 6 months if possible. . You will need antibiotics before dental work or surgery. This would decrease the risk of infection.    Driving: You should not drive for the first 5 days after your procedure. The first time you drive, you must have someone with you. You may ride in a car.    Activity: People recover at different rates depending on their general health and the type of heart valve procedure. Most people take a few weeks to feel fully recovered. Daily activity and exercise are an important part of your recovery. Do not lift, push or pull anything > 10 lb. for the first 5 days.     For the First 5 days after your procedure Do Not:  Lift, push, or pull more than ten pounds (including pets, laundry, groceries, etc)  Garden, including lawn mowing and raking  Excessively bear down or strain when having a bowel movement  Ride a Bike  For the first 5 days after your procedure Do:  Weigh yourself every day in the morning after using the bathroom. If you notice weight gain of more than 3 pounds in 1 day or more than 5 pounds in 1 week, call the cardiology clinic.  Get up and get dressed every day. Do not stay in bed. Set a daily routine  Walk as much as you can. You may walk up and down stairs. When you are tired, rest.  Cough and deep breathe. Use your incentive spirometer 5-10 times each hour when you are awake.   We strongly recommend you participate in a cardiac rehabilitation program. This type of program will help you learn about your heart health, prevent more heart problems, participate in safe and heart-healthy activities, and learn how to return to your activities of daily living and hobbies.  Let the cardiology clinic know if you need to leave town before your first follow-up visit.    When to call the Cardiology Clinic to speak with a nurse?  Swelling of the legs, ankles, or feet  Increased shortness of breath  Change in the color  or temperature of your lower legs and feet  Abdominal pain or unrelieved nausea and or vomiting  Redness and warmth around your access site and/or incision that does not go away   Yellow or green drainage from your access site and/or incision   Weight gain of 3 pounds in one day or 5 pounds in one week   Develop any numbness, tingling, or limited movement of your arms or legs.   Chest pain that does not go away   New confusion or you cannot think clearly   Fever and chills        CALL THE VALVE CLINIC IF YOU HAVE ANY QUESTIONS OR CONCERNS:    [phone]

## 2023-10-27 ENCOUNTER — PATIENT OUTREACH (OUTPATIENT)
Dept: CARE COORDINATION | Facility: CLINIC | Age: 78
End: 2023-10-27
Payer: COMMERCIAL

## 2023-10-27 LAB
ATRIAL RATE - MUSE: 55 BPM
ATRIAL RATE - MUSE: 60 BPM
ATRIAL RATE - MUSE: 68 BPM
DIASTOLIC BLOOD PRESSURE - MUSE: NORMAL MMHG
INTERPRETATION ECG - MUSE: NORMAL
P AXIS - MUSE: 11 DEGREES
P AXIS - MUSE: 26 DEGREES
P AXIS - MUSE: 4 DEGREES
PR INTERVAL - MUSE: 172 MS
PR INTERVAL - MUSE: 172 MS
PR INTERVAL - MUSE: 176 MS
QRS DURATION - MUSE: 124 MS
QRS DURATION - MUSE: 130 MS
QRS DURATION - MUSE: 134 MS
QT - MUSE: 498 MS
QT - MUSE: 500 MS
QT - MUSE: 518 MS
QTC - MUSE: 476 MS
QTC - MUSE: 518 MS
QTC - MUSE: 531 MS
R AXIS - MUSE: -56 DEGREES
R AXIS - MUSE: -58 DEGREES
R AXIS - MUSE: -60 DEGREES
SYSTOLIC BLOOD PRESSURE - MUSE: NORMAL MMHG
T AXIS - MUSE: -6 DEGREES
T AXIS - MUSE: -61 DEGREES
T AXIS - MUSE: 28 DEGREES
VENTRICULAR RATE- MUSE: 55 BPM
VENTRICULAR RATE- MUSE: 60 BPM
VENTRICULAR RATE- MUSE: 68 BPM

## 2023-10-27 ASSESSMENT — ACTIVITIES OF DAILY LIVING (ADL): DEPENDENT_IADLS:: INDEPENDENT

## 2023-10-27 NOTE — PLAN OF CARE
Occupational Therapy Discharge Summary    Reason for therapy discharge:    Discharged to home with outpatient therapy.    Progress towards therapy goal(s). See goals on Care Plan in [facility] electronic health record for goal details.  Goals partially met.  Barriers to achieving goals:   discharge from facility.    Therapy recommendation(s):    Continued therapy is recommended.  Rationale/Recommendations:  DARRIAN WESTBROOK.

## 2023-10-27 NOTE — PROGRESS NOTES
Clinic Care Coordination Contact  Clinic Care Coordination Contact  OUTREACH with Post Discharge Assessment    Referral Information:  Referral Source: IP Handoff    Primary Diagnosis: Cardiovascular - other    Chief Complaint   Patient presents with    Clinic Care Coordination - Post Hospital     TCM discharge date 10/26/23        Universal Utilization: The patient uses the [facility] system and the [facility].  Clinic Utilization  Difficulty keeping appointments:: No  Compliance Concerns: No  No-Show Concerns: No  No PCP office visit in Past Year: No  Utilization      No Show Count (past year)  0             ED Visits  0             Hospital Admissions  2                    Current as of: 10/27/2023  4:48 AM                Clinical Concerns:  Current Medical Concerns: The patient had a recent replacement of her aortic valve.  She is currently on a 14-day heart monitor.  The patient is scheduled to begin cardiac rehab next week.  The patient will be practicing the exercises from cardiac rehab between sessions.  The patient will be working on increasing her strength and stamina by walking around her townhome and gradually increase the distance walked or amount of time walked as tolerated.  The patient accepted care coordination at this time and the RN CC will reach out to the patient in 2-3 weeks.    Patient Active Problem List   Diagnosis    Hypothyroidism due to acquired atrophy of thyroid    Hyperlipidemia LDL goal <130    Essential hypertension with goal blood pressure less than 140/90    Osteopenia of both hips    Nonrheumatic aortic valve stenosis    Decreased GFR    Aortic stenosis, severe     Current Behavioral Concerns: none current noted.    Education Provided to patient: options for care coordination and assistance with setting goals.   Pain  Pain (GOAL):: No  Health Maintenance Reviewed: Due/Overdue   Health Maintenance Due   Topic Date Due    ALT  Never done    HF ACTION PLAN  Never done    RSV  VACCINE 60+ (1 - 1-dose 60+ series) Never done    MEDICARE ANNUAL WELLNESS VISIT  01/31/2023       Clinical Pathway: None    Admission:    Admission Date: 10/25/23   Reason for Admission: planned procedure  Discharge:   Discharge Date: 10/26/23  Discharge Diagnosis: aortic valve replacement    Enrollment  Outreach Frequency: 2 weeks, more frequently as needed    Discharge Assessment  How are you doing now that you are home?: Feeling really good.  Concerned that she will overdue it as she feels good.  How are your symptoms? (Red Flag symptoms escalate to triage hotline per guidelines): Improved  Do you feel your condition is stable enough to be safe at home until your provider visit?: Yes  Does the patient have their discharge instructions? : Yes  Does the patient have questions regarding their discharge instructions? : No  Were you started on any new medications or were there changes to any of your previous medications? : Yes  Does the patient have all of their medications?: Yes  Do you have questions regarding any of your medications? : No  Do you have all of your needed medical supplies or equipment (DME)?  (i.e. oxygen tank, CPAP, cane, etc.): Yes  Discharge follow-up appointment scheduled within 14 calendar days? : Yes  Discharge Follow Up Appointment Date: 10/30/23  Discharge Follow Up Appointment Scheduled with?: Specialty Care Provider         Post-op (Clinicians Only)  Did the patient have surgery or a procedure: Yes  Incision: closed  Drainage: No  Bleeding: none  Fever: No  Chills: No  Redness: No  Warmth: No  Swelling: No  Incision site pain: No  Closure: suture  Eating & Drinking: eating and drinking without complaints/concerns  PO Intake: regular diet  Bowel Function: normal  Urinary Status: voiding without complaint/concerns    Medication Management:  Medication review status: Medications reviewed.  Changes noted per patient report.  Patient is knowledgeable on medications and is adherent.  No  financial concerns reported at this time.  Medication review was completed with the patient and there are no questions or concerns at this time.       Functional Status:  Dependent ADLs:: Independent  Dependent IADLs:: Independent  Bed or wheelchair confined:: No  Mobility Status: Independent  Fallen 2 or more times in the past year?: No  Any fall with injury in the past year?: No    Living Situation:  Current living arrangement:: I live alone  Type of residence:: Town home    Lifestyle & Psychosocial Needs:    Social Determinants of Health     Food Insecurity: Not on file   Depression: Not at risk (2/22/2023)    PHQ-2     PHQ-2 Score: 0   Housing Stability: Not on file   Tobacco Use: Low Risk  (10/25/2023)    Patient History     Smoking Tobacco Use: Never     Smokeless Tobacco Use: Never     Passive Exposure: Not on file   Financial Resource Strain: Not on file   Alcohol Use: Not At Risk (12/9/2019)    AUDIT-C     Frequency of Alcohol Consumption: Never     Average Number of Drinks: Not on file     Frequency of Binge Drinking: Not on file   Transportation Needs: Not on file   Physical Activity: Not on file   Interpersonal Safety: Not on file   Stress: Not on file   Social Connections: Not on file     Diet:: Regular  Inadequate nutrition (GOAL):: No  Tube Feeding: No  Inadequate activity/exercise (GOAL):: No  Significant changes in sleep pattern (GOAL): No  Transportation means:: Family, Regular car     Nondenominational or spiritual beliefs that impact treatment:: No  Mental health DX:: No  Mental health management concern (GOAL):: No  Chemical Dependency Status: No Current Concerns  Informal Support system:: Friends, Neighbors, Family             Resources and Interventions:  Current Resources:      Community Resources: None  Supplies Currently Used at Home: None  Equipment Currently Used at Home: none  Employment Status: retired         Advance Care Plan/Directive  Advanced Care Plans/Directives on file:: Yes  Status of  record:: On File and Validated  Type Advanced Care Plans/Directives: Advanced Directive - On File    Referrals Placed: None     Care Plan:   Care Plan: General - increase strength and stamina       Problem: HP GENERAL PROBLEM       Goal: General Goal - increase strength and stamina       Start Date: 10/27/2023    Note:     Barriers: Recent surgery for aortic valve replacement.  Strengths: Engaged in care coordination  Patient expressed understanding of goal: Yes  Action steps to achieve this goal:  1. I will make and attend all cardiac rehab appointments  2. I will I will practice the exercises from cardiac rehab between sessions.  3. I will walk around my home gradually adding time walked and/or distance walked as tolerated.                              Care Plan: General - AWV       Problem: HP GENERAL PROBLEM       Goal: General Goal - please update text       Start Date: 10/27/2023    Note:     Goal Statement: I will complete my annual wellness visit.    Barriers: Recent surgery to replace aortic valve  Strengths: Engaged in care coordination    Patient expressed understanding of goal: yes  Action steps to achieve this goal:  1. I will schedule my annual wellness visit; [phone] ([phone])  2. I will attend my annual wellness visit.  3. I will contact my Care Management or clinic team if I have barriers to attending my annual wellness visit.                                 Patient/Caregiver understanding: The patient has a very good understanding of the disease process and the need to follow the rehab schedule.    Outreach Frequency: 2 weeks, more frequently as needed  Future Appointments                In 3 days Cherie Barrera NP [facility]    In 5 days [facility] [address] [place] [address] Cardiac and Pulmonary Rehabilitation [facility]    In 1 month [facility]    In 1 month [address] Heart  [facility]    In 1 month Cherie Barrera NP [facility] Heart [facility]            Plan: 1.  The patient will make and attend all cardiac rehab classes, and practice between sessions.  2.  The patient will make and attend all recommended appointments from the providers.  3.  The patient will take all medications as prescribed by the providers.             Daniel Cho MSN, RN, PHN, [facility]   Primary Care Clinical RN Care Coordinator  [facility]  10/27/2023   11:59 AM  [email or web address]  Office: [phone]

## 2023-10-27 NOTE — LETTER
[facility]  Patient Centered Plan of Care  About Me:        Patient Name:  Jenna Kaur    YOB: 1945  Age:         77 year old   Jennifer MRN:    [id number] Telephone Information:  Home Phone [phone]   Mobile [phone]       Address:  [address] [place] [address] [address] [address] Email address:  [email or web address]      Emergency Contact(s)    Name Relationship Lgl Grd Work Phone Home Phone Mobile Phone   1. ADRIANA NICHOLAS (* Niece No   [phone]   2. RAOUL ARRIAGA* Brother No  [phone]   3. SANDRO NICHOLAS Niece    [phone]           Primary language:  English     needed? No   [place] [facility]:  [phone] op. 1  Other communication barriers:Glasses    Preferred Method of Communication:     Current living arrangement: I live alone    Mobility Status/ Medical Equipment: Independent        Health Maintenance  Health Maintenance Reviewed: Due/Overdue   Health Maintenance Due   Topic Date Due    ALT  Never done    HF ACTION PLAN  Never done    RSV VACCINE 60+ (1 - 1-dose 60+ series) Never done    MEDICARE ANNUAL WELLNESS VISIT  01/31/2023           My Access Plan  Medical Emergency 911   Primary Clinic Line [facility] [phone]   24 Hour Appointment Line [phone] or  [phone]) (toll-free)   24 Hour Nurse Line [phone] (toll-free)   Preferred Urgent Care No data recorded   Preferred Hospital [facility]  [phone]     Preferred Pharmacy [facility] - [place], [place] - [address]      Behavioral Health Crisis Line The National Suicide Prevention Lifeline at 1-522.667.9189 or Text/Call 008           My Care Team Members  Patient Care Team         Relationship Specialty Notifications Start End    Kenna Garcia PA-C PCP - General Physician Assistant  5/6/19     Phone: [phone] Fax: [phone]          [address] [place] [address]    Kenna Garcia PA-C Assigned PCP   10/18/18     Phone: [phone] Fax: [phone]         [address] [place] [address]    BELLO Haas MD MD Cardiovascular Disease  8/18/22     Phone: [phone] Fax: [phone]         [address] [address]    BELLO Haas MD Assigned Heart and Vascular Provider   9/24/22     Phone: [phone] Fax: [phone]         [address] [address]    Daniel Mcgregor, RN Lead Care Coordinator Primary Care - CC Admissions 10/27/23     Phone: [phone] Fax: [phone]                    My Care Plans  Self Management and Treatment Plan    Care Plan  Care Plan: General - increase strength and stamina       Problem: HP GENERAL PROBLEM       Goal: General Goal - increase strength and stamina       Start Date: 10/27/2023    Note:     Barriers: Recent surgery for aortic valve replacement.  Strengths: Engaged in care coordination  Patient expressed understanding of goal: Yes  Action steps to achieve this goal:  1. I will make and attend all cardiac rehab appointments  2. I will I will practice the exercises from cardiac rehab between sessions.  3. I will walk around my home gradually adding time walked and/or distance walked as tolerated.                              Care Plan: General - AWV       Problem: HP GENERAL PROBLEM       Goal: General Goal - please update text       Start Date: 10/27/2023    Note:     Goal Statement: I will complete my annual wellness visit.    Barriers: Recent surgery to replace aortic valve  Strengths: Engaged in care coordination    Patient expressed understanding of goal: yes  Action steps to achieve this goal:  1. I will schedule my annual wellness visit; [phone] ([phone])  2. I will attend my annual wellness visit.  3. I will contact my Care Management or clinic team if I have barriers to attending my annual wellness visit.                                  Action Plans on File:                       Advance Care Plans/Directives:   Advanced Care Plan/Directives on file:   Yes    Status of Document(s):   On File and Validated    Advanced Care Plan/Directives Type:   Advanced Directive - On File           My Medical and Care Information  Problem List   Patient Active Problem List   Diagnosis    Hypothyroidism due to acquired atrophy of thyroid    Hyperlipidemia LDL goal <130    Essential hypertension with goal blood pressure less than 140/90    Osteopenia of both hips    Nonrheumatic aortic valve stenosis    Decreased GFR    Aortic stenosis, severe      Current Medications and Allergies:  See printed Medication Report.    Care Coordination Start Date: 10/27/2023   Frequency of Care Coordination: 2 weeks, more frequently as needed     Form Last Updated: 10/27/2023

## 2023-10-27 NOTE — LETTER
[facility] CARE COORDINATION  [address]  [place] [address]    October 27, 2023    Jenna BUENO Natasha  [address]  [place] [address]      Dear Jenna,    I am a clinic care coordinator who works with Kenna Garcia PA-C with the [facility]. I wanted to introduce myself and provide you with my contact information for you to be able to call me with any questions or concerns. I wanted to thank you for spending the time to talk with me.  Below is a description of clinic care coordination and how I can further assist you.       The clinic care coordination team is made up of a registered nurse, , financial resource worker and community health worker who understand the health care system. The goal of clinic care coordination is to help you manage your health and improve access to the health care system. Our team works alongside your provider to assist you in determining your health and social needs. We can help you obtain health care and community resources, providing you with necessary information and education. We can work with you through any barriers and develop a care plan that helps coordinate and strengthen the communication between you and your care team.  Our services are voluntary and are offered without charge to you personally.    Please feel free to contact me with any questions or concerns regarding care coordination and what we can offer.      We are focused on providing you with the highest-quality healthcare experience possible.    Sincerely,     Daniel Cho MSN, RN, PHN, [facility]   Primary Care Clinical RN Care Coordinator  [facility]  10/27/2023   9:48 AM  [email or web address]  Office: [phone]      Enclosed: I have enclosed a copy of the Patient Centered Plan of Care. This has helpful information and goals that we have talked about. Please keep this in an easy to access place to use as needed.

## 2023-10-30 ENCOUNTER — OFFICE VISIT (OUTPATIENT)
Dept: CARDIOLOGY | Facility: CLINIC | Age: 78
End: 2023-10-30
Attending: NURSE PRACTITIONER
Payer: COMMERCIAL

## 2023-10-30 VITALS
WEIGHT: 192.5 LBS | DIASTOLIC BLOOD PRESSURE: 81 MMHG | SYSTOLIC BLOOD PRESSURE: 146 MMHG | OXYGEN SATURATION: 94 % | HEART RATE: 50 BPM | BODY MASS INDEX: 32.03 KG/M2

## 2023-10-30 DIAGNOSIS — I10 ESSENTIAL HYPERTENSION WITH GOAL BLOOD PRESSURE LESS THAN 140/90: ICD-10-CM

## 2023-10-30 DIAGNOSIS — I48.19 PERSISTENT ATRIAL FIBRILLATION (H): ICD-10-CM

## 2023-10-30 DIAGNOSIS — Z95.2 S/P TAVR (TRANSCATHETER AORTIC VALVE REPLACEMENT): Primary | ICD-10-CM

## 2023-10-30 DIAGNOSIS — I45.10 RBBB: ICD-10-CM

## 2023-10-30 PROCEDURE — 99214 OFFICE O/P EST MOD 30 MIN: CPT | Performed by: NURSE PRACTITIONER

## 2023-10-30 PROCEDURE — G0463 HOSPITAL OUTPT CLINIC VISIT: HCPCS | Performed by: NURSE PRACTITIONER

## 2023-10-30 RX ORDER — AMOXICILLIN 500 MG/1
2000 CAPSULE ORAL
Qty: 4 CAPSULE | Refills: 3 | Status: SHIPPED | OUTPATIENT
Start: 2023-10-30

## 2023-10-30 RX ORDER — LOSARTAN POTASSIUM 100 MG/1
100 TABLET ORAL DAILY
Qty: 90 TABLET | Refills: 3 | Status: SHIPPED | OUTPATIENT
Start: 2023-10-30 | End: 2023-10-30

## 2023-10-30 RX ORDER — LOSARTAN POTASSIUM 50 MG/1
50 TABLET ORAL DAILY
Qty: 90 TABLET | Refills: 3 | Status: SHIPPED | OUTPATIENT
Start: 2023-10-30

## 2023-10-30 RX ORDER — CARVEDILOL 6.25 MG/1
6.25 TABLET ORAL 2 TIMES DAILY WITH MEALS
Qty: 180 TABLET | Refills: 1 | Status: SHIPPED | OUTPATIENT
Start: 2023-10-30 | End: 2023-11-27

## 2023-10-30 ASSESSMENT — PAIN SCALES - GENERAL: PAINLEVEL: NO PAIN (0)

## 2023-10-30 NOTE — PATIENT INSTRUCTIONS
You were seen today in the Structural Heart Clinic at the [facility].    Cardiology provider you saw during your visit: Cherie Barrera NP    Instructions:   You are in new onset atrial fibrillation  Please start Eliquis 5 mg twice daily for stroke prophylaxis  Restart Coreg 6.25 mg twice daily  Stop aspirin  Continue lower dose of losartan 50 mg daily   Start cardiac rehab on Wednesday - okay to drive starting Wednesday   Delay any nonurgent dental procedures for the first 6 month post TAVR.  For all future dental cleanings and procedures you will need to take antibiotics prior - see instructions below.   Will review cardiac monitor one month post TAVR if still in A-fib will continue cardioversion   Follow-up in Valve Clinic in 1 month with echo, labs and ECG prior     Prevention of Infective (Bacterial) Endocarditis:  You are at increased risk for developing adverse outcomes from infective endocarditis (IE), also known as bacterial endocarditis (BE) because of the new device in your heart. The guidelines for prevention of IE are to give patients antibiotics prior to any dental procedures that involve manipulation of gingival tissue or the periapical region of teeth, or perforation of the oral mucosa:      It is recommended to take Amoxicillin 2 gm by mouth as a single dose 30 to 60 minutes before procedure.     OR if allergic to Penicillin or Ampicillin:     Cephalexin 2 gm by mouth, or  Clindamycin 600 mg by mouth, or  Azithromycin or Clarithromycin 500 mg PO       Questions and scheduling:   First call: Structural Heart  Sonia Rios [phone]    General scheduling line: [phone].   First press #1 for the University and then press #3 for Medical Questions to reach the Cardiology triage nurse.     On Call Cardiologist for after hours or on weekends: [phone], press option #4 and ask to speak to the on-call Cardiologist.

## 2023-10-30 NOTE — PROGRESS NOTES
[facility] HEART [facility]  CARDIOVASCULAR DIVISION    VALVE CLINIC RETURN VISIT    PRIMARY CARDIOLOGIST: Dr. Orellana       PERTINENT CLINICAL HISTORY & REASON FOR CONSULTATION:     Jenna Kaur is a very pleasant 77 year old female who presents for 1 week TAVR follow-up. She has a history of severe aortic valvular stenosis that was treated with a transfemoral transcatheter aortic valve replacement (TAVR) with a 26 mm Catalan Jae 3 Resilia on 10/25/23 by Lucia Leyva and Lamont.  The TAVR and post-procedural course were notable for no complications. Her post procedure ECG showed stable RBBB and post procedure echo showed normal TAVR function with mean PG of 9 mmhg with trace PVL. She was discharged on ASA monotherapy and cardiac monitor given higher risk of CHB with baseline RBBB.     She presents today with no specific cardiovascular concerns. She states she has been feeling well since her valve replacement. She has been attempting to walk 5 minutes three times a day. She has no chest pain, shortness of breath, orthopnea or PND.  She has not had any palpitations, pre or everardo syncope. Her legs feel a little tired with walking. Her groin sites are healing well.    BP is a little elevated in clinic. Her Coreg was held on discharge. She continues to take losartan 50 mg daily.     HR also noted to be low at 48 in clinic prompting EKG showing new onset AF with RVR .      PAST MEDICAL HISTORY:     HTN  HLD  RBBB  CKD  Severe aortic stenosis s/p TAVR      PAST SURGICAL HISTORY:     Past Surgical History:   Procedure Laterality Date    ABDOMEN SURGERY      CHOLECYSTECTOMY      COLONOSCOPY      CV CORONARY ANGIOGRAM N/A 10/2/2023    Procedure: Coronary Angiogram;  Surgeon: Garett Real MD;  Location: [facility] CARDIAC CATH LAB    CV PCI N/A 10/2/2023    Procedure: Percutaneous Coronary Intervention;  Surgeon: Garett Real MD;  Location: [facility] CARDIAC CATH LAB    EYE SURGERY  Cataract  surgery 11/2 & 11/16/2021        CURRENT MEDICATIONS:     Current Outpatient Medications   Medication Sig Dispense Refill    aspirin 81 MG EC tablet Take 81 mg by mouth daily      Calcium Carbonate-Vitamin D (CALCIUM 500 + D PO) Take 2 tablets by mouth daily Takes 2 tablets      levothyroxine (SYNTHROID/LEVOTHROID) 112 MCG tablet Take 1 tablet (112 mcg) by mouth daily 90 tablet 3    losartan (COZAAR) 50 MG tablet Take 1 tablet (50 mg) by mouth daily 90 tablet 3    polyethylene glycol (MIRALAX/GLYCOLAX) powder Take 17 g by mouth daily as needed      simvastatin (ZOCOR) 10 MG tablet Take 1 tablet (10 mg) by mouth At Bedtime 90 tablet 3        ALLERGIES:     Allergies   Allergen Reactions    Erythromycin GI Disturbance     Nausea and sick feeling        FAMILY HISTORY:     Family History   Problem Relation Age of Onset    Breast Cancer Mother 70    Colon Cancer Father 59    Arrhythmia Brother     Sleep Apnea Brother         SOCIAL HISTORY:     Social History     Socioeconomic History    Marital status:    Tobacco Use    Smoking status: Never    Smokeless tobacco: Never   Vaping Use    Vaping Use: Never used   Substance and Sexual Activity    Alcohol use: Never    Drug use: Never    Sexual activity: Never   Other Topics Concern    Parent/sibling w/ CABG, MI or angioplasty before 65F 55M? No        REVIEW OF SYSTEMS:     Constitutional: No fevers or chills  Skin: No new rash or itching  Eyes: No acute change in vision  Ears/Nose/Throat: No purulent rhinorrhea, new hearing loss, or new vertigo  Respiratory: No cough or hemoptysis  Cardiovascular: See HPI  Gastrointestinal: No change in appetite, vomiting, hematemesis or diarrhea  Genitourinary: No dysuria or hematuria  Musculoskeletal: No new back pain, neck pain or muscle pain  Neurologic: No new headaches, focal weakness or behavior changes  Psychiatric: No hallucinations, excessive alcohol consumption or illegal drug usage  Hematologic/Lymphatic/Immunologic: No  "bleeding, chills, fever, night sweats or weight loss  Endocrine: No new cold intolerance, heat intolerance, polyphagia, polydipsia or polyuria      PHYSICAL EXAMINATION:     BP (!) 146/81 (BP Location: Right arm, Patient Position: Chair, Cuff Size: Adult Large)   Pulse 50   Wt 87.3 kg (192 lb 8 oz)   SpO2 94%   BMI 32.03 kg/m      GENERAL: No acute distress.  HEENT: EOMI. Sclerae white, not injected. Nares clear. Pharynx without erythema or exudate.   Neck: No adenopathy. No thyromegaly. No jugular venous distension.   Heart: Irregularly irregular No murmur.   Lungs: Clear to auscultation. No ronchi, wheezes, rales.   Abdomen: Soft, nontender, nondistended. Bowel sounds present.  Extremities: No clubbing, cyanosis, or edema.   Neurologic: Alert and oriented to person/place/time, normal speech and affect. No focal deficits.  Skin: No petechiae, purpura or rash.     LABORATORY DATA:     LIPID RESULTS:  Lab Results   Component Value Date    CHOL 153 02/22/2023    CHOL 162 12/11/2020    HDL 69 02/22/2023    HDL 65 12/11/2020    LDL 64 02/22/2023    LDL 76 12/11/2020    TRIG 102 02/22/2023    TRIG 106 12/11/2020       LIVER ENZYME RESULTS:  No results found for: \"AST\", \"ALT\"    CBC RESULTS:  Lab Results   Component Value Date    WBC 11.4 (H) 10/26/2023    WBC 7.1 11/01/2018    RBC 4.09 10/26/2023    RBC 4.61 11/01/2018    HGB 12.9 10/26/2023    HGB 14.3 11/01/2018    HCT 38.1 10/26/2023    HCT 42.5 11/01/2018    MCV 93 10/26/2023    MCV 92 11/01/2018    MCH 31.5 10/26/2023    MCH 31.0 11/01/2018    MCHC 33.9 10/26/2023    MCHC 33.6 11/01/2018    RDW 12.1 10/26/2023    RDW 12.2 11/01/2018     (L) 10/26/2023     11/01/2018       BMP RESULTS:  Lab Results   Component Value Date     10/26/2023     02/10/2021    POTASSIUM 3.8 10/26/2023    POTASSIUM 4.1 02/22/2023    POTASSIUM 4.5 02/10/2021    CHLORIDE 104 10/26/2023    CHLORIDE 111 (H) 02/22/2023    CHLORIDE 105 02/10/2021    CO2 23 10/26/2023 "    CO2 28 02/22/2023    CO2 31 02/10/2021    ANIONGAP 12 10/26/2023    ANIONGAP 5 02/22/2023    ANIONGAP 3 02/10/2021     (H) 10/26/2023     (H) 10/25/2023     (H) 02/22/2023     (H) 02/10/2021    BUN 14.9 10/26/2023    BUN 25 02/22/2023    BUN 23 02/10/2021    CR 0.77 10/26/2023    CR 1.00 02/10/2021    GFRESTIMATED 78 10/26/2023    GFRESTIMATED 54 (L) 02/10/2021    GFRESTBLACK 63 02/10/2021    JOSE 9.2 10/26/2023    JOSE 9.1 02/10/2021        A1C RESULTS:  Lab Results   Component Value Date    A1C 5.2 01/31/2022    A1C 5.4 11/01/2018       INR RESULTS:  Lab Results   Component Value Date    INR 1.20 (H) 10/23/2023    INR 1.15 10/02/2023          PROCEDURES & FURTHER ASSESSMENTS:       EKG today:     AF with RVR     EKG 10/26/23 POD 1:       Echocardiogram 10/26/23 POD 1:  Interpretation Summary  TAVR with 26mm Catalan Jae 3 Resilia valve 10/25/23. The mean gradient  across the aortic valve is 9 mmHg. There is trace paravalvular regurgitation.  Global and regional left ventricular function is normal with an EF of 60-65%.  Global right ventricular function is normal.  IVC diameter <2.1 cm collapsing >50% with sniff suggests a normal RA pressure  of 3 mmHg.  No pericardial effusion is present.     TAVR 10/25/23:  Plan      Follow bedrest per protocol   Continued medical management and lifestyle modifications for cardiovascular risk factor optimizations.   Admit to inpatient        1. Aspirin 81 mg po daily lifelong.  2. Bedrest per protocol.  3. Admit to the primary inpatient team for further evaluation and management.  4. Echocardiogram tomorrow.   5. Lifelong antibiotic prophylaxis prior to all dental procedures.  6. Plan for ambulatory heart rhythm monitor due to baseline RBBB.        NYHA Class: I     CLINICAL IMPRESSION:     Jenna Kaur is a 77 year old female who presents for 1 week TAVR f/up.     1.Severe aortic stenosis s/p TAVR w/ 26 mm ESV 10/25/23:  2. Hx RBBB:  3. Chronic  diastolic heart failure, resolved post TAVR:  S/p TAVR with 26 mm Edward Jae valve without complication. POD#1 echo with mean PG 9 mmHg with trace AI. EKG with baseline RBBB, no new conduction abnormalities. Cardiac monitor placed given higher risk of CHB. Prior to procedure NT-BNP 2,518, LVEDP > 18 consistent with acute on chronic HFpEF. Heart failure symptoms improved post valve deployment. No diuretic therapy indicated.   - Stop ASA, starting Eliquis, see below   - SBE prophylaxis lifelong   - Follow-up 1 month with echo and labs, review cardiac monitor results.     4. New onset AF with RVR:  Today EKG shows new onset AF with RVR . Patient entirely asymptomatic and hemodynamically stable.   - Resume Coreg lower dose 6.25 mg BID  - Start Eliquis 5 mg BID for stroke prophylaxis for [id number] of 4 (HTN, age, gender)  - Continue rate control for now, review monitor results at 1 month visit, if persistent AF will consider DCCV, if paroxysmal continue rate control    5. HLD: continue PTA Simvastatin 10 mg daily    6. HTN: Continue losartan 50 mg daily, adding Coreg as above     7. Minimal CAD: Continue statin, okay to stop ASA now on Eliquis       JUAN CARLOS Andrade, CNP  [facility] Cardiology Team      CC  Patient Care Team:  Kenna Garcia PA-C as PCP - General (Physician Assistant)  Kenna Garcia PA-C as Assigned PCP  BELLO Haas MD as MD (Cardiovascular Disease)  BELLO Haas MD as Assigned Heart and Vascular Provider  Daniel Mcgregor RN as Lead Care Coordinator (Primary Care - CC)

## 2023-10-30 NOTE — LETTER
10/30/2023      RE: Jenna Kaur  [id number] [place] [address]       Dear Colleague,    Thank you for the opportunity to participate in the care of your patient, Jenna Kaur, at the [facility] HEART CLINIC [place] [facility]. Please see a copy of my visit note below.        [facility] HEART CARE  CARDIOVASCULAR DIVISION    VALVE CLINIC RETURN VISIT    PRIMARY CARDIOLOGIST: Dr. Orellana       PERTINENT CLINICAL HISTORY & REASON FOR CONSULTATION:     Jenna Kaur is a very pleasant 77 year old female who presents for 1 week TAVR follow-up. She has a history of severe aortic valvular stenosis that was treated with a transfemoral transcatheter aortic valve replacement (TAVR) with a 26 mm Catalan Jae 3 Resilia on 10/25/23 by Lucia Leyva and Lamont.  The TAVR and post-procedural course were notable for no complications. Her post procedure ECG showed stable RBBB and post procedure echo showed normal TAVR function with mean PG of 9 mmhg with trace PVL. She was discharged on ASA monotherapy and cardiac monitor given higher risk of CHB with baseline RBBB.     She presents today with no specific cardiovascular concerns. She states she has been feeling well since her valve replacement. She has been attempting to walk 5 minutes three times a day. She has no chest pain, shortness of breath, orthopnea or PND.  She has not had any palpitations, pre or everardo syncope. Her legs feel a little tired with walking. Her groin sites are healing well.    BP is a little elevated in clinic. Her Coreg was held on discharge. She continues to take losartan 50 mg daily.     HR also noted to be low at 48 in clinic prompting EKG showing new onset AF with RVR .      PAST MEDICAL HISTORY:     HTN  HLD  RBBB  CKD  Severe aortic stenosis s/p TAVR      PAST SURGICAL HISTORY:     Past Surgical History:   Procedure Laterality Date    ABDOMEN SURGERY       CHOLECYSTECTOMY      COLONOSCOPY      CV CORONARY ANGIOGRAM N/A 10/2/2023    Procedure: Coronary Angiogram;  Surgeon: Garett Real MD;  Location:  HEART CARDIAC CATH LAB    CV PCI N/A 10/2/2023    Procedure: Percutaneous Coronary Intervention;  Surgeon: Garett Real MD;  Location: [facility] CARDIAC CATH LAB    EYE SURGERY  Cataract surgery 11/2 & 11/16/2021        CURRENT MEDICATIONS:     Current Outpatient Medications   Medication Sig Dispense Refill    aspirin 81 MG EC tablet Take 81 mg by mouth daily      Calcium Carbonate-Vitamin D (CALCIUM 500 + D PO) Take 2 tablets by mouth daily Takes 2 tablets      levothyroxine (SYNTHROID/LEVOTHROID) 112 MCG tablet Take 1 tablet (112 mcg) by mouth daily 90 tablet 3    losartan (COZAAR) 50 MG tablet Take 1 tablet (50 mg) by mouth daily 90 tablet 3    polyethylene glycol (MIRALAX/GLYCOLAX) powder Take 17 g by mouth daily as needed      simvastatin (ZOCOR) 10 MG tablet Take 1 tablet (10 mg) by mouth At Bedtime 90 tablet 3        ALLERGIES:     Allergies   Allergen Reactions    Erythromycin GI Disturbance     Nausea and sick feeling        FAMILY HISTORY:     Family History   Problem Relation Age of Onset    Breast Cancer Mother 70    Colon Cancer Father 59    Arrhythmia Brother     Sleep Apnea Brother         SOCIAL HISTORY:     Social History     Socioeconomic History    Marital status:    Tobacco Use    Smoking status: Never    Smokeless tobacco: Never   Vaping Use    Vaping Use: Never used   Substance and Sexual Activity    Alcohol use: Never    Drug use: Never    Sexual activity: Never   Other Topics Concern    Parent/sibling w/ CABG, MI or angioplasty before 65F 55M? No        REVIEW OF SYSTEMS:     Constitutional: No fevers or chills  Skin: No new rash or itching  Eyes: No acute change in vision  Ears/Nose/Throat: No purulent rhinorrhea, new hearing loss, or new vertigo  Respiratory: No cough or hemoptysis  Cardiovascular: See  "HPI  Gastrointestinal: No change in appetite, vomiting, hematemesis or diarrhea  Genitourinary: No dysuria or hematuria  Musculoskeletal: No new back pain, neck pain or muscle pain  Neurologic: No new headaches, focal weakness or behavior changes  Psychiatric: No hallucinations, excessive alcohol consumption or illegal drug usage  Hematologic/Lymphatic/Immunologic: No bleeding, chills, fever, night sweats or weight loss  Endocrine: No new cold intolerance, heat intolerance, polyphagia, polydipsia or polyuria      PHYSICAL EXAMINATION:     BP (!) 146/81 (BP Location: Right arm, Patient Position: Chair, Cuff Size: Adult Large)   Pulse 50   Wt 87.3 kg (192 lb 8 oz)   SpO2 94%   BMI 32.03 kg/m      GENERAL: No acute distress.  HEENT: EOMI. Sclerae white, not injected. Nares clear. Pharynx without erythema or exudate.   Neck: No adenopathy. No thyromegaly. No jugular venous distension.   Heart: Irregularly irregular No murmur.   Lungs: Clear to auscultation. No ronchi, wheezes, rales.   Abdomen: Soft, nontender, nondistended. Bowel sounds present.  Extremities: No clubbing, cyanosis, or edema.   Neurologic: Alert and oriented to person/place/time, normal speech and affect. No focal deficits.  Skin: No petechiae, purpura or rash.     LABORATORY DATA:     LIPID RESULTS:  Lab Results   Component Value Date    CHOL 153 02/22/2023    CHOL 162 12/11/2020    HDL 69 02/22/2023    HDL 65 12/11/2020    LDL 64 02/22/2023    LDL 76 12/11/2020    TRIG 102 02/22/2023    TRIG 106 12/11/2020       LIVER ENZYME RESULTS:  No results found for: \"AST\", \"ALT\"    CBC RESULTS:  Lab Results   Component Value Date    WBC 11.4 (H) 10/26/2023    WBC 7.1 11/01/2018    RBC 4.09 10/26/2023    RBC 4.61 11/01/2018    HGB 12.9 10/26/2023    HGB 14.3 11/01/2018    HCT 38.1 10/26/2023    HCT 42.5 11/01/2018    MCV 93 10/26/2023    MCV 92 11/01/2018    MCH 31.5 10/26/2023    MCH 31.0 11/01/2018    MCHC 33.9 10/26/2023    [facility] 33.6 11/01/2018    RDW " 12.1 10/26/2023    RDW 12.2 11/01/2018     (L) 10/26/2023     11/01/2018       BMP RESULTS:  Lab Results   Component Value Date     10/26/2023     02/10/2021    POTASSIUM 3.8 10/26/2023    POTASSIUM 4.1 02/22/2023    POTASSIUM 4.5 02/10/2021    CHLORIDE 104 10/26/2023    CHLORIDE 111 (H) 02/22/2023    CHLORIDE 105 02/10/2021    CO2 23 10/26/2023    CO2 28 02/22/2023    CO2 31 02/10/2021    ANIONGAP 12 10/26/2023    ANIONGAP 5 02/22/2023    ANIONGAP 3 02/10/2021     (H) 10/26/2023     (H) 10/25/2023     (H) 02/22/2023     (H) 02/10/2021    BUN 14.9 10/26/2023    BUN 25 02/22/2023    BUN 23 02/10/2021    CR 0.77 10/26/2023    CR 1.00 02/10/2021    GFRESTIMATED 78 10/26/2023    GFRESTIMATED 54 (L) 02/10/2021    GFRESTBLACK 63 02/10/2021    JOSE 9.2 10/26/2023    JOSE 9.1 02/10/2021        A1C RESULTS:  Lab Results   Component Value Date    A1C 5.2 01/31/2022    A1C 5.4 11/01/2018       INR RESULTS:  Lab Results   Component Value Date    INR 1.20 (H) 10/23/2023    INR 1.15 10/02/2023          PROCEDURES & FURTHER ASSESSMENTS:       EKG today:     AF with RVR     EKG 10/26/23 POD 1:       Echocardiogram 10/26/23 POD 1:  Interpretation Summary  TAVR with 26mm Catalan Jae 3 Resilia valve 10/25/23. The mean gradient  across the aortic valve is 9 mmHg. There is trace paravalvular regurgitation.  Global and regional left ventricular function is normal with an EF of 60-65%.  Global right ventricular function is normal.  IVC diameter <2.1 cm collapsing >50% with sniff suggests a normal RA pressure  of 3 mmHg.  No pericardial effusion is present.     TAVR 10/25/23:  Plan      Follow bedrest per protocol   Continued medical management and lifestyle modifications for cardiovascular risk factor optimizations.   Admit to inpatient        1. Aspirin 81 mg po daily lifelong.  2. Bedrest per protocol.  3. Admit to the primary inpatient team for further evaluation and  management.  4. Echocardiogram tomorrow.   5. Lifelong antibiotic prophylaxis prior to all dental procedures.  6. Plan for ambulatory heart rhythm monitor due to baseline RBBB.        NYHA Class: I     CLINICAL IMPRESSION:     Jenna Kaur is a 77 year old female who presents for 1 week TAVR f/up.     1.Severe aortic stenosis s/p TAVR w/ 26 mm ESV 10/25/23:  2. Hx RBBB:  3. Chronic diastolic heart failure, resolved post TAVR:  S/p TAVR with 26 mm Edward Jae valve without complication. POD#1 echo with mean PG 9 mmHg with trace AI. EKG with baseline RBBB, no new conduction abnormalities. Cardiac monitor placed given higher risk of CHB. Prior to procedure NT-BNP 2,518, LVEDP > 18 consistent with acute on chronic HFpEF. Heart failure symptoms improved post valve deployment. No diuretic therapy indicated.   - Stop ASA, starting Eliquis, see below   - SBE prophylaxis lifelong   - Follow-up 1 month with echo and labs, review cardiac monitor results.     4. New onset AF with RVR:  Today EKG shows new onset AF with RVR . Patient entirely asymptomatic and hemodynamically stable.   - Resume Coreg lower dose 6.25 mg BID  - Start Eliquis 5 mg BID for stroke prophylaxis for [id number] of 4 (HTN, age, gender)  - Continue rate control for now, review monitor results at 1 month visit, if persistent AF will consider DCCV, if paroxysmal continue rate control    5. HLD: continue PTA Simvastatin 10 mg daily    6. HTN: Continue losartan 50 mg daily, adding Coreg as above     7. Minimal CAD: Continue statin, okay to stop ASA now on Eliquis       JUAN CARLOS Andrade, CNP  [facility] Cardiology Team      CC  Patient Care Team:  Kenna Garcia PA-C as PCP - General (Physician Assistant)

## 2023-10-30 NOTE — NURSING NOTE
Chief Complaint   Patient presents with    Follow Up      1wk follow up s/p TAVR       Vitals were taken and medications reconciled.    Abdirizak Crowley, EMT  12:50 PM     WBC 9.6 --> 12  Afebrile  H. Pylori negative  C. Diff and Giardiasis negative  Ova, parasites, stool culture and stool fat negative

## 2023-11-01 ENCOUNTER — HOSPITAL ENCOUNTER (OUTPATIENT)
Dept: CARDIAC REHAB | Facility: HOSPITAL | Age: 78
Discharge: HOME | End: 2023-11-01
Attending: NURSE PRACTITIONER
Payer: COMMERCIAL

## 2023-11-01 DIAGNOSIS — Z95.2 S/P TAVR (TRANSCATHETER AORTIC VALVE REPLACEMENT): ICD-10-CM

## 2023-11-01 PROCEDURE — 93797 PHYS/QHP OP CAR RHAB WO ECG: CPT

## 2023-11-01 PROCEDURE — 93798 PHYS/QHP OP CAR RHAB W/ECG: CPT

## 2023-11-03 ENCOUNTER — HOSPITAL ENCOUNTER (OUTPATIENT)
Dept: CARDIAC REHAB | Facility: HOSPITAL | Age: 78
Discharge: HOME | End: 2023-11-03
Attending: INTERNAL MEDICINE
Payer: COMMERCIAL

## 2023-11-03 PROCEDURE — 93798 PHYS/QHP OP CAR RHAB W/ECG: CPT

## 2023-11-06 ENCOUNTER — HOSPITAL ENCOUNTER (OUTPATIENT)
Dept: CARDIAC REHAB | Facility: HOSPITAL | Age: 78
Discharge: HOME | End: 2023-11-06
Attending: INTERNAL MEDICINE
Payer: COMMERCIAL

## 2023-11-06 PROCEDURE — 93798 PHYS/QHP OP CAR RHAB W/ECG: CPT

## 2023-11-08 ENCOUNTER — HOSPITAL ENCOUNTER (OUTPATIENT)
Dept: CARDIAC REHAB | Facility: HOSPITAL | Age: 78
Discharge: HOME | End: 2023-11-08
Attending: INTERNAL MEDICINE
Payer: COMMERCIAL

## 2023-11-08 PROCEDURE — 93798 PHYS/QHP OP CAR RHAB W/ECG: CPT

## 2023-11-10 ENCOUNTER — HOSPITAL ENCOUNTER (OUTPATIENT)
Dept: CARDIAC REHAB | Facility: HOSPITAL | Age: 78
Discharge: HOME | End: 2023-11-10
Attending: INTERNAL MEDICINE
Payer: COMMERCIAL

## 2023-11-10 ENCOUNTER — PATIENT OUTREACH (OUTPATIENT)
Dept: CARE COORDINATION | Facility: CLINIC | Age: 78
End: 2023-11-10
Payer: COMMERCIAL

## 2023-11-10 PROCEDURE — 93798 PHYS/QHP OP CAR RHAB W/ECG: CPT

## 2023-11-10 NOTE — PROGRESS NOTES
Clinic Care Coordination Contact  Follow Up Progress Note      Assessment: The RN CC nurse care coordinator contacted the patient by phone for a follow up call.  The patient has been attending all of the cardiac rehab sessions and practicing the exercises in between.  The patient states that she went back to an exercise class that she had been working on and was able to do most of it.  The RN CC encouraged the patient to step back when she got too tired or short of breath so that she could be successful.  The patient had a couple of questions about her upcoming lab appointment and we were able to discuss those and give her the information that she needed.  The patient is open to a follow-up call again in a month.    Care Gaps:    Health Maintenance Due   Topic Date Due    ALT  Never done    HF ACTION PLAN  Never done    RSV VACCINE (Pregnancy & 60+) (1 - 1-dose 60+ series) Never done    MEDICARE ANNUAL WELLNESS VISIT  01/31/2023       Care Gaps Last addressed on 11/10/23    Care Plans  Care Plan: General - increase strength and stamina       Problem: HP GENERAL PROBLEM       Goal: General Goal - increase strength and stamina       Start Date: 10/27/2023    This Visit's Progress: 20%    Note:     Barriers: Recent surgery for aortic valve replacement.  Strengths: Engaged in care coordination  Patient expressed understanding of goal: Yes  Action steps to achieve this goal:  1. I will make and attend all cardiac rehab appointments  2. I will I will practice the exercises from cardiac rehab between sessions.  3. I will walk around my home gradually adding time walked and/or distance walked as tolerated.                              Care Plan: General - AWV       Problem: HP GENERAL PROBLEM       Goal: General Goal - AWV       Start Date: 10/27/2023    Note:     Goal Statement: I will complete my annual wellness visit.    Barriers: Recent surgery to replace aortic valve  Strengths: Engaged in care coordination    Patient  expressed understanding of goal: yes  Action steps to achieve this goal:  1. I will schedule my annual wellness visit; [id number] ([phone])  2. I will attend my annual wellness visit.  3. I will contact my Care Management or clinic team if I have barriers to attending my annual wellness visit.                                 Intervention/Education provided during outreach: Reviewed with the patient the need for an annual wellness exam sometime after the first of the year.  Reviewed the importance of attending the cardiac rehab classes.     Outreach Frequency: monthly, more frequently as needed      Plan:   1.  The patient will make and attend all of the cardiac rehab sessions that she is to attend.  2.  The patient will take all medications as prescribed by the providers.  3.  The patient will make her annual wellness visit after the first of the year.    RN CC Nurse Care Coordinator will follow up in 30 days.          Daniel Cho MSN, RN, PHN, [facility]   Primary Care Clinical RN Care Coordinator  [facility]  11/10/2023   2:36 PM  [email or web address]  Office: [phone]

## 2023-11-13 ENCOUNTER — HOSPITAL ENCOUNTER (OUTPATIENT)
Dept: CARDIAC REHAB | Facility: HOSPITAL | Age: 78
Discharge: HOME | End: 2023-11-13
Attending: INTERNAL MEDICINE
Payer: COMMERCIAL

## 2023-11-13 PROCEDURE — 93798 PHYS/QHP OP CAR RHAB W/ECG: CPT

## 2023-11-15 ENCOUNTER — HOSPITAL ENCOUNTER (OUTPATIENT)
Dept: CARDIAC REHAB | Facility: HOSPITAL | Age: 78
Discharge: HOME | End: 2023-11-15
Attending: INTERNAL MEDICINE
Payer: COMMERCIAL

## 2023-11-15 PROCEDURE — 93798 PHYS/QHP OP CAR RHAB W/ECG: CPT

## 2023-11-15 PROCEDURE — 93797 PHYS/QHP OP CAR RHAB WO ECG: CPT | Mod: 59

## 2023-11-17 ENCOUNTER — HOSPITAL ENCOUNTER (OUTPATIENT)
Dept: CARDIAC REHAB | Facility: HOSPITAL | Age: 78
Discharge: HOME | End: 2023-11-17
Attending: INTERNAL MEDICINE
Payer: COMMERCIAL

## 2023-11-17 PROCEDURE — 93798 PHYS/QHP OP CAR RHAB W/ECG: CPT

## 2023-11-20 ENCOUNTER — HOSPITAL ENCOUNTER (OUTPATIENT)
Dept: CARDIAC REHAB | Facility: HOSPITAL | Age: 78
Discharge: HOME | End: 2023-11-20
Attending: INTERNAL MEDICINE
Payer: COMMERCIAL

## 2023-11-20 PROCEDURE — 93798 PHYS/QHP OP CAR RHAB W/ECG: CPT

## 2023-11-22 ENCOUNTER — HOSPITAL ENCOUNTER (OUTPATIENT)
Dept: CARDIAC REHAB | Facility: HOSPITAL | Age: 78
Discharge: HOME | End: 2023-11-22
Attending: INTERNAL MEDICINE
Payer: COMMERCIAL

## 2023-11-22 PROCEDURE — 93798 PHYS/QHP OP CAR RHAB W/ECG: CPT

## 2023-11-22 PROCEDURE — 93797 PHYS/QHP OP CAR RHAB WO ECG: CPT

## 2023-11-24 ENCOUNTER — HOSPITAL ENCOUNTER (OUTPATIENT)
Dept: CARDIAC REHAB | Facility: HOSPITAL | Age: 78
Discharge: HOME | End: 2023-11-24
Attending: INTERNAL MEDICINE
Payer: COMMERCIAL

## 2023-11-24 PROCEDURE — 93798 PHYS/QHP OP CAR RHAB W/ECG: CPT

## 2023-11-26 NOTE — PROGRESS NOTES
[facility] HEART [facility]  CARDIOVASCULAR DIVISION    VALVE CLINIC RETURN VISIT    PRIMARY CARDIOLOGIST: Dr. Orellana       PERTINENT CLINICAL HISTORY & REASON FOR CONSULTATION:     Jenna aKur is a very pleasant 77 year old female who presents for 1 month TAVR follow-up. She has a history of HTN, HLD, bifascicular block and severe aortic valvular stenosis that was treated with a transfemoral transcatheter aortic valve replacement (TAVR) with a 26 mm Catalan Jae 3 Resilia on 10/25/23 by Lucia Leyva and Lamont.  The TAVR and post-procedural course were notable for no complications. Her post procedure ECG showed stable bifasicular block and post procedure echo showed normal TAVR function with mean PG of 9 mmhg with trace PVL. She was discharged on ASA monotherapy and cardiac monitor given higher risk of CHB with baseline block. She was seen one week post TAVR and noted to have new onset AF with RVR, she was started on Eliquis 5 mg BID and Coreg 6.25 mg BID, with plans to discuss DCCV is she remained in AF at her 1 month follow-up visit.     She presents today with no specific cardiovascular concerns. She states she has been feeling well since her valve replacement. She has been participating in cardiac rehab three days and on her off days she has been walking on the treadmill at her club house 35 minutes. She has lost a few pounds. She has no chest pain, shortness of breath, orthopnea or PND.  She has not had any palpitations, pre or everardo syncope. Her groin sites are fully healed. BP is generally 115-130s systolic at cardiac rehab, has been a little elevated for the past few days in the 160s. But no cardiac or neuro symptoms.     BP is a little elevated in clinic. She continues to take losartan 50 mg daily and Coreg 6.25 mg BID      PAST MEDICAL HISTORY:     HTN  HLD  RBBB, LAD, 1st degree AVB  CKD  Severe aortic stenosis s/p TAVR      PAST SURGICAL HISTORY:     Past Surgical History:   Procedure Laterality  Date    ABDOMEN SURGERY      CHOLECYSTECTOMY      COLONOSCOPY      CV CORONARY ANGIOGRAM N/A 10/2/2023    Procedure: Coronary Angiogram;  Surgeon: Garett Real MD;  Location:  HEART CARDIAC CATH LAB    CV PCI N/A 10/2/2023    Procedure: Percutaneous Coronary Intervention;  Surgeon: Garett Real MD;  Location:  HEART CARDIAC CATH LAB    CV TRANSCATHETER AORTIC VALVE REPLACEMENT-FEMORAL APPROACH N/A 10/25/2023    Procedure: Transcatheter aortic valve replacement with any indicated procedure. (Catalan);  Surgeon: Colton Leyva MD;  Location:  OR    EYE SURGERY  Cataract surgery 11/2 & 11/16/2021    OR TRANSCATHETER AORTIC VALVE REPLACEMENT, FEMORAL PERCUTANEOUS APPROACH (STANDBY) N/A 10/25/2023    Procedure: OR TRANSCATHETER AORTIC VALVE REPLACEMENT, OPEN FEMORAL ARTERY APPROACH- Catalan;  Surgeon: William Abernathy MD;  Location:  OR        CURRENT MEDICATIONS:     Current Outpatient Medications   Medication Sig Dispense Refill    amoxicillin (AMOXIL) 500 MG capsule Take 4 capsules (2,000 mg) by mouth once as needed (SBE prophyalxis take 30-60 minutes prior to dental procedure or cleaning) 4 capsule 3    apixaban ANTICOAGULANT (ELIQUIS) 5 MG tablet Take 1 tablet (5 mg) by mouth 2 times daily 180 tablet 3    Calcium Carbonate-Vitamin D (CALCIUM 500 + D PO) Take 2 tablets by mouth daily Takes 2 tablets      carvedilol (COREG) 6.25 MG tablet Take 1 tablet (6.25 mg) by mouth 2 times daily (with meals) 180 tablet 1    levothyroxine (SYNTHROID/LEVOTHROID) 112 MCG tablet Take 1 tablet (112 mcg) by mouth daily 90 tablet 3    losartan (COZAAR) 50 MG tablet Take 1 tablet (50 mg) by mouth daily 90 tablet 3    polyethylene glycol (MIRALAX/GLYCOLAX) powder Take 17 g by mouth daily as needed      simvastatin (ZOCOR) 10 MG tablet Take 1 tablet (10 mg) by mouth At Bedtime 90 tablet 3        ALLERGIES:     Allergies   Allergen Reactions    Erythromycin GI Disturbance     Nausea and sick feeling  "   Magnesium      States she got diarrhea when given this at the hospital and wants that in her chart        FAMILY HISTORY:     Family History   Problem Relation Age of Onset    Breast Cancer Mother 70    Colon Cancer Father 59    Arrhythmia Brother     Sleep Apnea Brother         SOCIAL HISTORY:     Social History     Socioeconomic History    Marital status:    Tobacco Use    Smoking status: Never    Smokeless tobacco: Never   Vaping Use    Vaping Use: Never used   Substance and Sexual Activity    Alcohol use: Never    Drug use: Never    Sexual activity: Never   Other Topics Concern    Parent/sibling w/ CABG, MI or angioplasty before 65F 55M? No        REVIEW OF SYSTEMS:     Constitutional: No fevers or chills  Skin: No new rash or itching  Eyes: No acute change in vision  Ears/Nose/Throat: No purulent rhinorrhea, new hearing loss, or new vertigo  Respiratory: No cough or hemoptysis  Cardiovascular: See HPI  Gastrointestinal: No change in appetite, vomiting, hematemesis or diarrhea  Genitourinary: No dysuria or hematuria  Musculoskeletal: No new back pain, neck pain or muscle pain  Neurologic: No new headaches, focal weakness or behavior changes  Psychiatric: No hallucinations, excessive alcohol consumption or illegal drug usage  Hematologic/Lymphatic/Immunologic: No bleeding, chills, fever, night sweats or weight loss  Endocrine: No new cold intolerance, heat intolerance, polyphagia, polydipsia or polyuria      PHYSICAL EXAMINATION:     BP (!) 164/79 (BP Location: Right arm, Patient Position: Sitting, Cuff Size: Adult Regular)   Pulse 61   Ht 1.641 m (5' 4.61\")   Wt 83.6 kg (184 lb 4.8 oz)   SpO2 97%   BMI 31.04 kg/m      GENERAL: No acute distress.  HEENT: EOMI. Sclerae white, not injected. Nares clear. Pharynx without erythema or exudate.   Neck: No adenopathy. No thyromegaly. No jugular venous distension.   Heart: Irregularly irregular No murmur.   Lungs: Clear to auscultation. No ronchi, wheezes, " "rales.   Abdomen: Soft, nontender, nondistended. Bowel sounds present.  Extremities: No clubbing, cyanosis, or edema.   Neurologic: Alert and oriented to person/place/time, normal speech and affect. No focal deficits.  Skin: No petechiae, purpura or rash.     LABORATORY DATA:     LIPID RESULTS:  Lab Results   Component Value Date    CHOL 153 02/22/2023    CHOL 162 12/11/2020    HDL 69 02/22/2023    HDL 65 12/11/2020    LDL 64 02/22/2023    LDL 76 12/11/2020    TRIG 102 02/22/2023    TRIG 106 12/11/2020       LIVER ENZYME RESULTS:  No results found for: \"AST\", \"ALT\"    CBC RESULTS:  Lab Results   Component Value Date    WBC 11.4 (H) 10/26/2023    WBC 7.1 11/01/2018    RBC 4.09 10/26/2023    RBC 4.61 11/01/2018    HGB 12.9 10/26/2023    HGB 14.3 11/01/2018    HCT 38.1 10/26/2023    HCT 42.5 11/01/2018    MCV 93 10/26/2023    MCV 92 11/01/2018    MCH 31.5 10/26/2023    MCH 31.0 11/01/2018    MCHC 33.9 10/26/2023    MCHC 33.6 11/01/2018    RDW 12.1 10/26/2023    RDW 12.2 11/01/2018     (L) 10/26/2023     11/01/2018       BMP RESULTS:  Lab Results   Component Value Date     10/26/2023     02/10/2021    POTASSIUM 3.8 10/26/2023    POTASSIUM 4.1 02/22/2023    POTASSIUM 4.5 02/10/2021    CHLORIDE 104 10/26/2023    CHLORIDE 111 (H) 02/22/2023    CHLORIDE 105 02/10/2021    CO2 23 10/26/2023    CO2 28 02/22/2023    CO2 31 02/10/2021    ANIONGAP 12 10/26/2023    ANIONGAP 5 02/22/2023    ANIONGAP 3 02/10/2021     (H) 10/26/2023     (H) 10/25/2023     (H) 02/22/2023     (H) 02/10/2021    BUN 14.9 10/26/2023    BUN 25 02/22/2023    BUN 23 02/10/2021    CR 0.77 10/26/2023    CR 1.00 02/10/2021    GFRESTIMATED 78 10/26/2023    GFRESTIMATED 54 (L) 02/10/2021    GFRESTBLACK 63 02/10/2021    JOSE 9.2 10/26/2023    JOSE 9.1 02/10/2021        A1C RESULTS:  Lab Results   Component Value Date    A1C 5.2 01/31/2022    A1C 5.4 11/01/2018       INR RESULTS:  Lab Results   Component Value " Date    INR 1.20 (H) 10/23/2023    INR 1.15 10/02/2023          PROCEDURES & FURTHER ASSESSMENTS:     ECHO 11/27/23  ______________________________________________________________________________  Interpretation Summary  TAVR with 26mm Catalan Jae 3 Resilia valve 10/25/23.  The mean gradient across the aortic valve is 8 mmHg.  Trace paravalvular AI.  No significant changes noted.  ______________________________________________________________________________  Left Ventricle  TAVR with 26mm Catalan Jae 3 Resilia valve 10/25/23. Global and regional  left ventricular function is normal with an EF of 55-60%. Left ventricular  size is normal. Mild concentric wall thickening consistent with left  ventricular hypertrophy is present. Grade I or early diastolic dysfunction. No  regional wall motion abnormalities are seen.     Right Ventricle  Right ventricular function, chamber size, wall motion, and thickness are  normal.     Atria  Both atria appear normal. The atrial septum is intact as assessed by color  Doppler .     Mitral Valve  The mitral valve is normal. Trace mitral insufficiency is present.     Aortic Valve  The mean gradient across the aortic valve is 8 mmHg. Trace paravalvular AI.     Tricuspid Valve  The tricuspid valve is normal. Trace tricuspid insufficiency is present. The  right ventricular systolic pressure is approximated at 21.0 mmHg plus the  right atrial pressure. Pulmonary artery systolic pressure is normal.     Pulmonic Valve  The pulmonic valve is normal. Trace pulmonic insufficiency is present.     Vessels  The thoracic aorta is normal. The pulmonary artery and bifurcation cannot be  assessed. The inferior vena cava is normal.     Pericardium  No pericardial effusion is present.     Compared to Previous Study  No significant changes noted.    EKG 11/27/23:  NSR baseline bifasicular block       EKG 10/30/23     AF with RVR         EKG 10/26/23 POD 1:       Echocardiogram 10/26/23 POD  1:  Interpretation Summary  TAVR with 26mm Catalan Jae 3 Resilia valve 10/25/23. The mean gradient  across the aortic valve is 9 mmHg. There is trace paravalvular regurgitation.  Global and regional left ventricular function is normal with an EF of 60-65%.  Global right ventricular function is normal.  IVC diameter <2.1 cm collapsing >50% with sniff suggests a normal RA pressure  of 3 mmHg.  No pericardial effusion is present.     TAVR 10/25/23:  Plan      Follow bedrest per protocol   Continued medical management and lifestyle modifications for cardiovascular risk factor optimizations.   Admit to inpatient        1. Aspirin 81 mg po daily lifelong.  2. Bedrest per protocol.  3. Admit to the primary inpatient team for further evaluation and management.  4. Echocardiogram tomorrow.   5. Lifelong antibiotic prophylaxis prior to all dental procedures.  6. Plan for ambulatory heart rhythm monitor due to baseline RBBB.        NYHA Class: I     CLINICAL IMPRESSION:     Jenna Kaur is a 77 year old female who presents for 1 month TAVR f/up.     1.Severe aortic stenosis s/p TAVR w/ 26 mm ESV 10/25/23:  2. Hx bifasicular block:  3. Chronic diastolic heart failure, resolved post TAVR:  S/p TAVR with 26 mm Edward Jae valve without complication. POD#1 echo with mean PG 9 mmHg with trace AI. EKG with baseline RBBB, no new conduction abnormalities. Cardiac monitor placed given higher risk of CHB. Prior to procedure NT-BNP 2,518, LVEDP > 18 consistent with acute on chronic HFpEF. Heart failure symptoms improved post valve deployment. No diuretic therapy indicated. ECHO today shows normal TAVR function with mean PG 8 mmHg with trace PVL. Cardiac monitor without AVB.   - Continue Eliquis for AF, no strong indication for aspirin   - SBE prophylaxis lifelong   - Discussed rhythm with EP - given asymptomatic, no new conduction abnormalities, no heart block on monitor, no indication for PPM at this time     4. New onset AF with  RVR:  Today shows conversion to NSR HR 67. Cardiac monitor shows AF burden 6%, fairly well controlled HR in low 100s when in AF. No need for DCCV or antiarrythmic therapy.  - Continue rate control with Coreg, increase to 12.5 mg BID for better rate control    - Continue Eliquis 5 mg BID for stroke prophylaxis for [id number] of 4 (HTN, age, gender)    5. HLD: continue PTA Simvastatin 10 mg daily    6. HTN: Continue losartan 50 mg daily, increasing Coreg as above     7. Minimal CAD: Continue statin, okay to stop ASA now on Eliquis     RTC 6 months     JUAN CARLOS Andrade, CNP  [facility] Cardiology Team      CC  Patient Care Team:  Kenna Garcia PA-C as PCP - General (Physician Assistant)  Kenna Garcia PA-C as Assigned PCP  BELLO Haas MD as MD (Cardiovascular Disease)  Daniel Mcgregor, RN as Lead Care Coordinator (Primary Care - CC)  Cherie Barrera NP as Assigned Heart and Vascular Provider

## 2023-11-27 ENCOUNTER — ANCILLARY PROCEDURE (OUTPATIENT)
Dept: CARDIOLOGY | Facility: CLINIC | Age: 78
End: 2023-11-27
Attending: NURSE PRACTITIONER
Payer: COMMERCIAL

## 2023-11-27 ENCOUNTER — LAB (OUTPATIENT)
Dept: LAB | Facility: CLINIC | Age: 78
End: 2023-11-27
Payer: COMMERCIAL

## 2023-11-27 ENCOUNTER — HOSPITAL ENCOUNTER (OUTPATIENT)
Dept: CARDIAC REHAB | Facility: HOSPITAL | Age: 78
Discharge: HOME | End: 2023-11-27
Attending: INTERNAL MEDICINE
Payer: COMMERCIAL

## 2023-11-27 VITALS
HEART RATE: 61 BPM | BODY MASS INDEX: 30.71 KG/M2 | DIASTOLIC BLOOD PRESSURE: 79 MMHG | OXYGEN SATURATION: 97 % | HEIGHT: 65 IN | WEIGHT: 184.3 LBS | SYSTOLIC BLOOD PRESSURE: 164 MMHG

## 2023-11-27 DIAGNOSIS — I10 ESSENTIAL HYPERTENSION WITH GOAL BLOOD PRESSURE LESS THAN 140/90: ICD-10-CM

## 2023-11-27 DIAGNOSIS — Z95.2 S/P TAVR (TRANSCATHETER AORTIC VALVE REPLACEMENT): ICD-10-CM

## 2023-11-27 DIAGNOSIS — I45.10 RBBB: ICD-10-CM

## 2023-11-27 DIAGNOSIS — I48.0 PAROXYSMAL ATRIAL FIBRILLATION (H): ICD-10-CM

## 2023-11-27 DIAGNOSIS — Z95.2 S/P TAVR (TRANSCATHETER AORTIC VALVE REPLACEMENT): Primary | ICD-10-CM

## 2023-11-27 LAB
ANION GAP SERPL CALCULATED.3IONS-SCNC: 11 MMOL/L (ref 7–15)
BUN SERPL-MCNC: 14.7 MG/DL (ref 8–23)
CALCIUM SERPL-MCNC: 10.4 MG/DL (ref 8.8–10.2)
CHLORIDE SERPL-SCNC: 103 MMOL/L (ref 98–107)
CREAT SERPL-MCNC: 0.87 MG/DL (ref 0.51–0.95)
DEPRECATED HCO3 PLAS-SCNC: 27 MMOL/L (ref 22–29)
EGFRCR SERPLBLD CKD-EPI 2021: 67 ML/MIN/1.73M2
ERYTHROCYTE [DISTWIDTH] IN BLOOD BY AUTOMATED COUNT: 11.7 % (ref 10–15)
GLUCOSE SERPL-MCNC: 109 MG/DL (ref 70–99)
HCT VFR BLD AUTO: 43.5 % (ref 35–47)
HGB BLD-MCNC: 15.3 G/DL (ref 11.7–15.7)
LVEF ECHO: NORMAL
MCH RBC QN AUTO: 31.9 PG (ref 26.5–33)
MCHC RBC AUTO-ENTMCNC: 35.2 G/DL (ref 31.5–36.5)
MCV RBC AUTO: 91 FL (ref 78–100)
PLATELET # BLD AUTO: 162 10E3/UL (ref 150–450)
POTASSIUM SERPL-SCNC: 4.1 MMOL/L (ref 3.4–5.3)
RBC # BLD AUTO: 4.8 10E6/UL (ref 3.8–5.2)
SODIUM SERPL-SCNC: 141 MMOL/L (ref 135–145)
WBC # BLD AUTO: 9.3 10E3/UL (ref 4–11)

## 2023-11-27 PROCEDURE — 99214 OFFICE O/P EST MOD 30 MIN: CPT | Mod: 25 | Performed by: NURSE PRACTITIONER

## 2023-11-27 PROCEDURE — 85027 COMPLETE CBC AUTOMATED: CPT | Performed by: PATHOLOGY

## 2023-11-27 PROCEDURE — 93306 TTE W/DOPPLER COMPLETE: CPT | Performed by: INTERNAL MEDICINE

## 2023-11-27 PROCEDURE — G0463 HOSPITAL OUTPT CLINIC VISIT: HCPCS | Performed by: NURSE PRACTITIONER

## 2023-11-27 PROCEDURE — 36415 COLL VENOUS BLD VENIPUNCTURE: CPT | Performed by: PATHOLOGY

## 2023-11-27 PROCEDURE — 93010 ELECTROCARDIOGRAM REPORT: CPT | Performed by: INTERNAL MEDICINE

## 2023-11-27 PROCEDURE — 80048 BASIC METABOLIC PNL TOTAL CA: CPT | Performed by: PATHOLOGY

## 2023-11-27 PROCEDURE — 93005 ELECTROCARDIOGRAM TRACING: CPT

## 2023-11-27 PROCEDURE — 93798 PHYS/QHP OP CAR RHAB W/ECG: CPT

## 2023-11-27 RX ORDER — CARVEDILOL 12.5 MG/1
12.5 TABLET ORAL 2 TIMES DAILY WITH MEALS
Qty: 180 TABLET | Refills: 1 | Status: SHIPPED | OUTPATIENT
Start: 2023-11-27 | End: 2024-05-02

## 2023-11-27 ASSESSMENT — PAIN SCALES - GENERAL: PAINLEVEL: NO PAIN (0)

## 2023-11-27 NOTE — NURSING NOTE
Chief Complaint   Patient presents with    Follow Up     Return TAVR - 23-75 day s/p TAVR follow up       Vitals were taken, medications reconciled and EKG performed.    Suni Rivero CMA  12:38 PM

## 2023-11-27 NOTE — PATIENT INSTRUCTIONS
You were seen today in the Structural Heart Clinic at the [facility].    Cardiology provider you saw during your visit: Cherie Barrera NP    Your EKG and labs are within normal limits. Cardiac monitor shows you are in and out of atrial fibrillation, no heart block. ECHO pending, I will contact you with results.     Instructions:   Increase Coreg to 12.5 mg twice daily  Continue Eliquis 5 mg twice daily for stroke prophylaxis  Continue losartan 50 mg daily   Continue daily exercise   Delay any nonurgent dental procedures for the first 6 month post TAVR.  For all future dental cleanings and procedures you will need to take antibiotics prior - see instructions below.   Follow-up in Valve Clinic in 6 months     Prevention of Infective (Bacterial) Endocarditis:  You are at increased risk for developing adverse outcomes from infective endocarditis (IE), also known as bacterial endocarditis (BE) because of the new device in your heart. The guidelines for prevention of IE are to give patients antibiotics prior to any dental procedures that involve manipulation of gingival tissue or the periapical region of teeth, or perforation of the oral mucosa:      It is recommended to take Amoxicillin 2 gm by mouth as a single dose 30 to 60 minutes before procedure.     OR if allergic to Penicillin or Ampicillin:     Cephalexin 2 gm by mouth, or  Clindamycin 600 mg by mouth, or  Azithromycin or Clarithromycin 500 mg PO       Questions and scheduling:   First call: Structural Heart  Sonia Rios [phone]    General scheduling line: [phone].   First press #1 for the University and then press #3 for Medical Questions to reach the Cardiology triage nurse.     On Call Cardiologist for after hours or on weekends: [phone], press option #4 and ask to speak to the on-call Cardiologist.

## 2023-11-27 NOTE — LETTER
11/27/2023      RE: Jenna Kaur  [address] [place] [address]       Dear Colleague,    Thank you for the opportunity to participate in the care of your patient, Jenna Kaur, at the [facility]. Please see a copy of my visit note below.      [facility]  CARDIOVASCULAR DIVISION    VALVE CLINIC RETURN VISIT    PRIMARY CARDIOLOGIST: Dr. Orellana       PERTINENT CLINICAL HISTORY & REASON FOR CONSULTATION:     Jenna Kaur is a very pleasant 77 year old female who presents for 1 month TAVR follow-up. She has a history of HTN, HLD, bifascicular block and severe aortic valvular stenosis that was treated with a transfemoral transcatheter aortic valve replacement (TAVR) with a 26 mm Catalan Jae 3 Resilia on 10/25/23 by Lucia Leyva and Lamont.  The TAVR and post-procedural course were notable for no complications. Her post procedure ECG showed stable bifasicular block and post procedure echo showed normal TAVR function with mean PG of 9 mmhg with trace PVL. She was discharged on ASA monotherapy and cardiac monitor given higher risk of CHB with baseline block. She was seen one week post TAVR and noted to have new onset AF with RVR, she was started on Eliquis 5 mg BID and Coreg 6.25 mg BID, with plans to discuss DCCV is she remained in AF at her 1 month follow-up visit.     She presents today with no specific cardiovascular concerns. She states she has been feeling well since her valve replacement. She has been participating in cardiac rehab three days and on her off days she has been walking on the treadmill at her club house 35 minutes. She has lost a few pounds. She has no chest pain, shortness of breath, orthopnea or PND.  She has not had any palpitations, pre or everardo syncope. Her groin sites are fully healed. BP is generally 115-130s systolic at cardiac rehab, has been a little elevated for  the past few days in the 160s. But no cardiac or neuro symptoms.     BP is a little elevated in clinic. She continues to take losartan 50 mg daily and Coreg 6.25 mg BID      PAST MEDICAL HISTORY:     HTN  HLD  RBBB, LAD, 1st degree AVB  CKD  Severe aortic stenosis s/p TAVR      PAST SURGICAL HISTORY:     Past Surgical History:   Procedure Laterality Date    ABDOMEN SURGERY      CHOLECYSTECTOMY      COLONOSCOPY      CV CORONARY ANGIOGRAM N/A 10/2/2023    Procedure: Coronary Angiogram;  Surgeon: Garett Real MD;  Location:  HEART CARDIAC CATH LAB    CV PCI N/A 10/2/2023    Procedure: Percutaneous Coronary Intervention;  Surgeon: Garett Real MD;  Location:  HEART CARDIAC CATH LAB    CV TRANSCATHETER AORTIC VALVE REPLACEMENT-FEMORAL APPROACH N/A 10/25/2023    Procedure: Transcatheter aortic valve replacement with any indicated procedure. (Catalan);  Surgeon: Colton Leyva MD;  Location: [facility] OR    EYE SURGERY  Cataract surgery 11/2 & 11/16/2021    OR TRANSCATHETER AORTIC VALVE REPLACEMENT, FEMORAL PERCUTANEOUS APPROACH (STANDBY) N/A 10/25/2023    Procedure: OR TRANSCATHETER AORTIC VALVE REPLACEMENT, OPEN FEMORAL ARTERY APPROACH- Catalan;  Surgeon: William Abernathy MD;  Location: [facility]        CURRENT MEDICATIONS:     Current Outpatient Medications   Medication Sig Dispense Refill    amoxicillin (AMOXIL) 500 MG capsule Take 4 capsules (2,000 mg) by mouth once as needed (SBE prophyalxis take 30-60 minutes prior to dental procedure or cleaning) 4 capsule 3    apixaban ANTICOAGULANT (ELIQUIS) 5 MG tablet Take 1 tablet (5 mg) by mouth 2 times daily 180 tablet 3    Calcium Carbonate-Vitamin D (CALCIUM 500 + D PO) Take 2 tablets by mouth daily Takes 2 tablets      carvedilol (COREG) 6.25 MG tablet Take 1 tablet (6.25 mg) by mouth 2 times daily (with meals) 180 tablet 1    levothyroxine (SYNTHROID/LEVOTHROID) 112 MCG tablet Take 1 tablet (112 mcg) by mouth daily 90 tablet 3    losartan  (COZAAR) 50 MG tablet Take 1 tablet (50 mg) by mouth daily 90 tablet 3    polyethylene glycol (MIRALAX/GLYCOLAX) powder Take 17 g by mouth daily as needed      simvastatin (ZOCOR) 10 MG tablet Take 1 tablet (10 mg) by mouth At Bedtime 90 tablet 3        ALLERGIES:     Allergies   Allergen Reactions    Erythromycin GI Disturbance     Nausea and sick feeling    Magnesium      States she got diarrhea when given this at the hospital and wants that in her chart        FAMILY HISTORY:     Family History   Problem Relation Age of Onset    Breast Cancer Mother 70    Colon Cancer Father 59    Arrhythmia Brother     Sleep Apnea Brother         SOCIAL HISTORY:     Social History     Socioeconomic History    Marital status:    Tobacco Use    Smoking status: Never    Smokeless tobacco: Never   Vaping Use    Vaping Use: Never used   Substance and Sexual Activity    Alcohol use: Never    Drug use: Never    Sexual activity: Never   Other Topics Concern    Parent/sibling w/ CABG, MI or angioplasty before 65F 55M? No        REVIEW OF SYSTEMS:     Constitutional: No fevers or chills  Skin: No new rash or itching  Eyes: No acute change in vision  Ears/Nose/Throat: No purulent rhinorrhea, new hearing loss, or new vertigo  Respiratory: No cough or hemoptysis  Cardiovascular: See HPI  Gastrointestinal: No change in appetite, vomiting, hematemesis or diarrhea  Genitourinary: No dysuria or hematuria  Musculoskeletal: No new back pain, neck pain or muscle pain  Neurologic: No new headaches, focal weakness or behavior changes  Psychiatric: No hallucinations, excessive alcohol consumption or illegal drug usage  Hematologic/Lymphatic/Immunologic: No bleeding, chills, fever, night sweats or weight loss  Endocrine: No new cold intolerance, heat intolerance, polyphagia, polydipsia or polyuria      PHYSICAL EXAMINATION:     BP (!) 164/79 (BP Location: Right arm, Patient Position: Sitting, Cuff Size: Adult Regular)   Pulse 61   Ht 1.641 m  "(5' 4.61\")   Wt 83.6 kg (184 lb 4.8 oz)   SpO2 97%   BMI 31.04 kg/m      GENERAL: No acute distress.  HEENT: EOMI. Sclerae white, not injected. Nares clear. Pharynx without erythema or exudate.   Neck: No adenopathy. No thyromegaly. No jugular venous distension.   Heart: Irregularly irregular No murmur.   Lungs: Clear to auscultation. No ronchi, wheezes, rales.   Abdomen: Soft, nontender, nondistended. Bowel sounds present.  Extremities: No clubbing, cyanosis, or edema.   Neurologic: Alert and oriented to person/place/time, normal speech and affect. No focal deficits.  Skin: No petechiae, purpura or rash.     LABORATORY DATA:     LIPID RESULTS:  Lab Results   Component Value Date    CHOL 153 02/22/2023    CHOL 162 12/11/2020    HDL 69 02/22/2023    HDL 65 12/11/2020    LDL 64 02/22/2023    LDL 76 12/11/2020    TRIG 102 02/22/2023    TRIG 106 12/11/2020       LIVER ENZYME RESULTS:  No results found for: \"AST\", \"ALT\"    CBC RESULTS:  Lab Results   Component Value Date    WBC 11.4 (H) 10/26/2023    WBC 7.1 11/01/2018    RBC 4.09 10/26/2023    RBC 4.61 11/01/2018    HGB 12.9 10/26/2023    HGB 14.3 11/01/2018    HCT 38.1 10/26/2023    HCT 42.5 11/01/2018    MCV 93 10/26/2023    MCV 92 11/01/2018    MCH 31.5 10/26/2023    MCH 31.0 11/01/2018    MCHC 33.9 10/26/2023    MCHC 33.6 11/01/2018    RDW 12.1 10/26/2023    RDW 12.2 11/01/2018     (L) 10/26/2023     11/01/2018       BMP RESULTS:  Lab Results   Component Value Date     10/26/2023     02/10/2021    POTASSIUM 3.8 10/26/2023    POTASSIUM 4.1 02/22/2023    POTASSIUM 4.5 02/10/2021    CHLORIDE 104 10/26/2023    CHLORIDE 111 (H) 02/22/2023    CHLORIDE 105 02/10/2021    CO2 23 10/26/2023    CO2 28 02/22/2023    CO2 31 02/10/2021    ANIONGAP 12 10/26/2023    ANIONGAP 5 02/22/2023    ANIONGAP 3 02/10/2021     (H) 10/26/2023     (H) 10/25/2023     (H) 02/22/2023     (H) 02/10/2021    BUN 14.9 10/26/2023    BUN 25 " 02/22/2023    BUN 23 02/10/2021    CR 0.77 10/26/2023    CR 1.00 02/10/2021    GFRESTIMATED 78 10/26/2023    GFRESTIMATED 54 (L) 02/10/2021    GFRESTBLACK 63 02/10/2021    JOSE 9.2 10/26/2023    JOSE 9.1 02/10/2021        A1C RESULTS:  Lab Results   Component Value Date    A1C 5.2 01/31/2022    A1C 5.4 11/01/2018       INR RESULTS:  Lab Results   Component Value Date    INR 1.20 (H) 10/23/2023    INR 1.15 10/02/2023          PROCEDURES & FURTHER ASSESSMENTS:     ECHO 11/27/23  ______________________________________________________________________________  Interpretation Summary  TAVR with 26mm Catalan Jae 3 Resilia valve 10/25/23.  The mean gradient across the aortic valve is 8 mmHg.  Trace paravalvular AI.  No significant changes noted.  ______________________________________________________________________________  Left Ventricle  TAVR with 26mm Catalan Jae 3 Resilia valve 10/25/23. Global and regional  left ventricular function is normal with an EF of 55-60%. Left ventricular  size is normal. Mild concentric wall thickening consistent with left  ventricular hypertrophy is present. Grade I or early diastolic dysfunction. No  regional wall motion abnormalities are seen.     Right Ventricle  Right ventricular function, chamber size, wall motion, and thickness are  normal.     Atria  Both atria appear normal. The atrial septum is intact as assessed by color  Doppler .     Mitral Valve  The mitral valve is normal. Trace mitral insufficiency is present.     Aortic Valve  The mean gradient across the aortic valve is 8 mmHg. Trace paravalvular AI.     Tricuspid Valve  The tricuspid valve is normal. Trace tricuspid insufficiency is present. The  right ventricular systolic pressure is approximated at 21.0 mmHg plus the  right atrial pressure. Pulmonary artery systolic pressure is normal.     Pulmonic Valve  The pulmonic valve is normal. Trace pulmonic insufficiency is present.     Vessels  The thoracic aorta is normal.  The pulmonary artery and bifurcation cannot be  assessed. The inferior vena cava is normal.     Pericardium  No pericardial effusion is present.     Compared to Previous Study  No significant changes noted.    EKG 11/27/23:  NSR baseline bifasicular block       EKG 10/30/23     AF with RVR         EKG 10/26/23 POD 1:       Echocardiogram 10/26/23 POD 1:  Interpretation Summary  TAVR with 26mm Ctaalan Jae 3 Resilia valve 10/25/23. The mean gradient  across the aortic valve is 9 mmHg. There is trace paravalvular regurgitation.  Global and regional left ventricular function is normal with an EF of 60-65%.  Global right ventricular function is normal.  IVC diameter <2.1 cm collapsing >50% with sniff suggests a normal RA pressure  of 3 mmHg.  No pericardial effusion is present.     TAVR 10/25/23:  Plan      Follow bedrest per protocol   Continued medical management and lifestyle modifications for cardiovascular risk factor optimizations.   Admit to inpatient        1. Aspirin 81 mg po daily lifelong.  2. Bedrest per protocol.  3. Admit to the primary inpatient team for further evaluation and management.  4. Echocardiogram tomorrow.   5. Lifelong antibiotic prophylaxis prior to all dental procedures.  6. Plan for ambulatory heart rhythm monitor due to baseline RBBB.        NYHA Class: I     CLINICAL IMPRESSION:     Jenna Kaur is a 77 year old female who presents for 1 month TAVR f/up.     1.Severe aortic stenosis s/p TAVR w/ 26 mm ESV 10/25/23:  2. Hx bifasicular block:  3. Chronic diastolic heart failure, resolved post TAVR:  S/p TAVR with 26 mm Edward Jae valve without complication. POD#1 echo with mean PG 9 mmHg with trace AI. EKG with baseline RBBB, no new conduction abnormalities. Cardiac monitor placed given higher risk of CHB. Prior to procedure NT-BNP 2,518, LVEDP > 18 consistent with acute on chronic HFpEF. Heart failure symptoms improved post valve deployment. No diuretic therapy indicated. ECHO today  shows normal TAVR function with mean PG 8 mmHg with trace PVL. Cardiac monitor without AVB.   - Continue Eliquis for AF, no strong indication for aspirin   - SBE prophylaxis lifelong   - Discussed rhythm with EP - given asymptomatic, no new conduction abnormalities, no heart block on monitor, no indication for PPM at this time     4. New onset AF with RVR:  Today shows conversion to NSR HR 67. Cardiac monitor shows AF burden 6%, fairly well controlled HR in low 100s when in AF. No need for DCCV or antiarrythmic therapy.  - Continue rate control with Coreg, increase to 12.5 mg BID for better rate control    - Continue Eliquis 5 mg BID for stroke prophylaxis for [id number] of 4 (HTN, age, gender)    5. HLD: continue PTA Simvastatin 10 mg daily    6. HTN: Continue losartan 50 mg daily, increasing Coreg as above     7. Minimal CAD: Continue statin, okay to stop ASA now on Eliquis     RTC 6 months     JUAN CARLOS Andrade, CNP  [facility] Cardiology Team      CC  Patient Care Team:  Kenna Garcia PA-C as PCP - General (Physician Assistant)

## 2023-11-28 LAB
ATRIAL RATE - MUSE: 67 BPM
DIASTOLIC BLOOD PRESSURE - MUSE: NORMAL MMHG
INTERPRETATION ECG - MUSE: NORMAL
P AXIS - MUSE: 81 DEGREES
PR INTERVAL - MUSE: 180 MS
QRS DURATION - MUSE: 130 MS
QT - MUSE: 454 MS
QTC - MUSE: 479 MS
R AXIS - MUSE: -61 DEGREES
SYSTOLIC BLOOD PRESSURE - MUSE: NORMAL MMHG
T AXIS - MUSE: 40 DEGREES
VENTRICULAR RATE- MUSE: 67 BPM

## 2023-11-30 ENCOUNTER — HOSPITAL ENCOUNTER (OUTPATIENT)
Dept: CARDIAC REHAB | Facility: HOSPITAL | Age: 78
Discharge: HOME | End: 2023-11-30
Attending: INTERNAL MEDICINE
Payer: COMMERCIAL

## 2023-11-30 PROCEDURE — 93798 PHYS/QHP OP CAR RHAB W/ECG: CPT

## 2023-11-30 PROCEDURE — 93797 PHYS/QHP OP CAR RHAB WO ECG: CPT | Mod: 59

## 2023-12-21 ENCOUNTER — PATIENT OUTREACH (OUTPATIENT)
Dept: CARE COORDINATION | Facility: CLINIC | Age: 78
End: 2023-12-21
Payer: COMMERCIAL

## 2023-12-21 NOTE — PROGRESS NOTES
Clinic Care Coordination Contact  Follow Up Progress Note      Assessment: The RN CC nurse care coordinator contacted the patient by phone for a follow-up call.  Patient states that she is doing very well and working on her exercises from physical therapy.  Her latest exercise has been walking on the treadmill for 35 minutes the patient states that when she finishes she has this pain in her side like when you were a little kid and you got a stitch in your side.  She does have an appointment coming up with her PCP to question this.  She did try elevating the treadmill on the suggestion of someone else and that helped a little little but it is still there.  And her other concern is her blood pressure because her blood pressure meds were changed when she was in the hospital.  The RN CC nurse care coordinator reviewed with the patient her blood pressure readings and encouraged her to have her blood pressure taken with the manual set up not the electronic.  The RN CC verified with the patient that she has the contact information in case she needs to call with a question.        Care Gaps:    Health Maintenance Due   Topic Date Due    ALT  Never done    HF ACTION PLAN  Never done    RSV VACCINE (Pregnancy & 60+) (1 - 1-dose 60+ series) Never done    MEDICARE ANNUAL WELLNESS VISIT  01/31/2023       Care Gaps Last addressed on 12/21/23    Care Plans  Care Plan: General - increase strength and stamina       Problem: HP GENERAL PROBLEM       Goal: General Goal - increase strength and stamina       Start Date: 10/27/2023    This Visit's Progress: 20% Recent Progress: 20%    Note:     Barriers: Recent surgery for aortic valve replacement.  Strengths: Engaged in care coordination  Patient expressed understanding of goal: Yes  Action steps to achieve this goal:  1. I will make and attend all cardiac rehab appointments  2. I will I will practice the exercises from cardiac rehab between sessions.  3. I will walk around my home  gradually adding time walked and/or distance walked as tolerated.                              Care Plan: General - AWV       Problem: HP GENERAL PROBLEM       Goal: General Goal - AWV       Start Date: 10/27/2023    This Visit's Progress: 10%    Note:     Goal Statement: I will complete my annual wellness visit.    Barriers: Recent surgery to replace aortic valve  Strengths: Engaged in care coordination    Patient expressed understanding of goal: yes  Action steps to achieve this goal:  1. I will schedule my annual wellness visit; [phone] ([phone])  2. I will attend my annual wellness visit.  3. I will contact my Care Management or clinic team if I have barriers to attending my annual wellness visit.                                 Intervention/Education provided during outreach: The RN CC answered the questions that the patient had to the best of her ability.     Outreach Frequency: monthly, more frequently as needed        Plan:   1.  The patient will keep her appointment with her PCP to review her blood pressure readings as well as her questions about her medications.  2.  The patient will schedule her annual wellness visit with her PCP after she sees her this time.  3.  The patient will take all medications as prescribed by the providers.    RN CC Nurse Care Coordinator will follow up in 30 days.        Daniel Cho MSN, RN, PHN, CCM   Primary Care Clinical RN Care Coordinator  [facility]  12/21/2023   2:31 PM  [email or web address]  Office: [phone]

## 2023-12-26 ENCOUNTER — OFFICE VISIT (OUTPATIENT)
Dept: FAMILY MEDICINE | Facility: CLINIC | Age: 78
End: 2023-12-26
Payer: COMMERCIAL

## 2023-12-26 VITALS
OXYGEN SATURATION: 99 % | SYSTOLIC BLOOD PRESSURE: 120 MMHG | DIASTOLIC BLOOD PRESSURE: 68 MMHG | RESPIRATION RATE: 16 BRPM | WEIGHT: 178 LBS | BODY MASS INDEX: 29.98 KG/M2 | HEART RATE: 61 BPM | TEMPERATURE: 97.6 F

## 2023-12-26 DIAGNOSIS — Z95.2 S/P TAVR (TRANSCATHETER AORTIC VALVE REPLACEMENT): Primary | ICD-10-CM

## 2023-12-26 DIAGNOSIS — I35.0 NONRHEUMATIC AORTIC VALVE STENOSIS: ICD-10-CM

## 2023-12-26 PROCEDURE — 99213 OFFICE O/P EST LOW 20 MIN: CPT | Performed by: PHYSICIAN ASSISTANT

## 2023-12-26 RX ORDER — RESPIRATORY SYNCYTIAL VIRUS VACCINE 120MCG/0.5
0.5 KIT INTRAMUSCULAR ONCE
Qty: 1 EACH | Refills: 0 | Status: CANCELLED | OUTPATIENT
Start: 2023-12-26 | End: 2023-12-26

## 2023-12-26 NOTE — PROGRESS NOTES
"  Assessment & Plan       ICD-10-CM    1. S/P TAVR (transcatheter aortic valve replacement)  Z95.2       2. Nonrheumatic aortic valve stenosis  I35.0           1,2) She is doing well. Has already followed up with cardiology. No need for any med/dose changes today. She will follow up with cards again in April 2024.     May only be on Eliquis x3-6 months, will follow up with cards regarding this.           MED REC REQUIRED  Post Medication Reconciliation Status: discharge medications reconciled, continue medications without change  BMI:   Estimated body mass index is 29.98 kg/m  as calculated from the following:    Height as of 11/27/23: 1.641 m (5' 4.61\").    Weight as of this encounter: 80.7 kg (178 lb).     Return in about 3 months (around 3/26/2024) for your annual physical, a med check, fasting labs, with Kenna, in person.     Kenna Garcia PA-C  [facility] LUCAS West is a 77 year old, presenting for the following health issues:  Hospital F/U        12/26/2023     9:48 AM   Additional Questions   Roomed by Sandra   Accompanied by Self         12/26/2023     9:48 AM   Patient Reported Additional Medications   Patient reports taking the following new medications na       HPI     Hospital Follow-up Visit:    Hospital/Nursing Home/IP Rehab Facility: [facility]   Date of Admission: 10/25/23  Date of Discharge: 10/26/23  Reason(s) for Admission: Heart Valve Transplant     Had 1 mo TAVR follow up on 11/27 with cards  Carvedilol increased to 12/5mg BID  Recommended follow up in 6 months  Started on Eliquis  Went to Cardiac Rehab  Has has     Patient reports she has been doing well and wants to follow-up since her next Follow-up with her Cardiologist till April. She has been exercising as instructed 5 days per week, she also notes some lower back aches since starting exercising.     Was your hospitalization related to COVID-19? No   Problems taking " medications regularly:  None  Medication changes since discharge: None  Problems adhering to non-medication therapy:  None    Summary of hospitalization:  [facility] discharge summary reviewed  Diagnostic Tests/Treatments reviewed.  Follow up needed:   Other Healthcare Providers Involved in Patient s Care:         Cardiology - has already had her 1 month follow up   Update since discharge: improved.         Plan of care communicated with patient             Review of Systems   Constitutional, cardiovascular, pulmonary systems are negative, except as otherwise noted.      Objective    /68 (BP Location: Right arm, Patient Position: Chair, Cuff Size: Adult Regular)   Pulse 61   Temp 97.6  F (36.4  C) (Tympanic)   Resp 16   Wt 80.7 kg (178 lb)   SpO2 99%   BMI 29.98 kg/m    Body mass index is 29.98 kg/m .  Physical Exam   GENERAL: healthy, alert and no distress  RESP: lungs clear to auscultation - no rales, rhonchi or wheezes  CV: regular rates and rhythm, normal S1 S2, no S3 or S4, and no murmur, click or rub  MS: no gross musculoskeletal defects noted, no edema  SKIN: no suspicious lesions or rashes  NEURO: Normal strength and tone, mentation intact and speech normal  PSYCH: mentation appears normal, affect normal/bright

## 2024-01-11 ENCOUNTER — TELEPHONE (OUTPATIENT)
Dept: CARDIOLOGY | Facility: CLINIC | Age: 79
End: 2024-01-11
Payer: COMMERCIAL

## 2024-02-02 ENCOUNTER — PATIENT OUTREACH (OUTPATIENT)
Dept: CARE COORDINATION | Facility: CLINIC | Age: 79
End: 2024-02-02
Payer: COMMERCIAL

## 2024-02-02 NOTE — PROGRESS NOTES
Clinic Care Coordination Contact  Follow Up Progress Note      Assessment: The RN CC nurse care coordinator contacted the patient by phone for a follow-up call.  The patient is committed to doing 35 minutes on the treadmill 5 days a week.  She denies any pain and has her Medicare annual wellness visit already scheduled.  The patient thanked the RN CC for calling her and keeping her going.  We are going to move her to maintenance and call her again in 2 months to see if she is still doing okay or if she needs to be called more often.  The patient states that she has the contact information for the RN CC to be able to contact her as needed.    Care Gaps:    Health Maintenance Due   Topic Date Due    ALT  Never done    HF ACTION PLAN  Never done    RSV VACCINE (Pregnancy & 60+) (1 - 1-dose 60+ series) Never done    MEDICARE ANNUAL WELLNESS VISIT  01/31/2023    PHQ-2 (once per calendar year)  01/01/2024    LIPID  02/22/2024    MICROALBUMIN  02/22/2024    TSH W/FREE T4 REFLEX  02/22/2024       Care Gaps Last addressed on 2/2/24    Care Plans  Care Plan: General - increase strength and stamina       Problem: HP GENERAL PROBLEM       Goal: General Goal - increase strength and stamina       Start Date: 10/27/2023    This Visit's Progress: 20% Recent Progress: 20%    Note:     Barriers: Recent surgery for aortic valve replacement.  Strengths: Engaged in care coordination  Patient expressed understanding of goal: Yes  Action steps to achieve this goal:  1. I will make and attend all cardiac rehab appointments  2. I will I will practice the exercises from cardiac rehab between sessions.  3. I will walk around my home gradually adding time walked and/or distance walked as tolerated.                              Care Plan: General - AWV       Problem: HP GENERAL PROBLEM       Goal: General Goal - AWV       Start Date: 10/27/2023    This Visit's Progress: 10% Recent Progress: 10%    Note:     Goal Statement: I will complete my  annual wellness visit.    Barriers: Recent surgery to replace aortic valve  Strengths: Engaged in care coordination    Patient expressed understanding of goal: yes  Action steps to achieve this goal:  1. I will schedule my annual wellness visit; [phone] ([phone])  2. I will attend my annual wellness visit.  3. I will contact my Care Management or clinic team if I have barriers to attending my annual wellness visit.                                 Intervention/Education provided during outreach: Reviewed the goals with the patient.     Outreach Frequency: 2 months, more frequently as needed        Plan:   1.  The patient will make and attend her annual wellness visit.  2.  The patient will continue to walk on the treadmill for 35 minutes 5 days a week.  3.  The patient will be moved to maintenance and contacted in 2 months to see if more calls are needed or if she feels comfortable.    RN CC Nurse Care Coordinator will follow up in 8 weeks.        Daniel Cho MSN, RN, PHN, CCM   Primary Care Clinical RN Care Coordinator  [facility]  2/2/2024   3:02 PM  [email or web address]  Office: [phone]     [Normal] : Patient is awake and alert and in no acute distress [Conjunctiva] : normal conjunctiva [Eyelids] : normal eyelids [Pupils] : pupils were equal and round [Ears] : normal ears [Nose] : normal nose [Rash] : no rash [FreeTextEntry1] : Healthy appearing 1 year-old child. Awake, alert, in no acute distress. Pleasant and cooperative. \par Eyes are clear with no sclera abnormalities. External ears, nose and mouth are clear. \par Good respiratory effort with no audible wheezing without use of a stethoscope.\par Ambulates independently with no evidence of antalgia. Good coordination and balance.\par \par Left forearm: \par Skin is clean, dry and intact. There is no clinical deformity.\par No erythema, ecchymosis or swelling.\par Child is grossly nontender to palpation over fracture site\par Passive ROM full and painless with only 5 or so degrees lacking in pronation and supination\par Neurovascularly intact in radial/ulnar/median/AIN distribution.\par Radial pulse 2+. Brisk capillary refill in all digits.

## 2024-02-02 NOTE — LETTER
[facility] COORDINATION  [address] [place] [address]    February 2, 2024        Jenna Kaur  [address]  [place] [address]          Dear Lola West is an updated Patient Centered Plan of Care for your continued enrollment in Care Coordination. Please let us know if you have additional questions, concerns, or goals that we can assist with.    Sincerely,    Daniel Cho MSN, RN, PHN, CCM   Primary Care Clinical RN Care Coordinator  [facility]  2/2/2024   2:44 PM  [email or web address]  Office: [phone]            [facility]  Patient Centered Plan of Care  About Me:        Patient Name:  Jenna Kaur    YOB: 1945  Age:         78 year old   [place] MRN:    [id number] Telephone Information:  Home Phone [phone]   Mobile [phone]       Address:  [address] [place] [address] Email address:  [email or web address]      Emergency Contact(s)    Name Relationship Lgl Grd Work Phone Home Phone Mobile Phone   1. ADRIANA NICHOLAS (* Niece No   [phone]   2. RAOUL ARRIAGA* Brother No  [phone]   3. YUSANDRO Niece    [phone]           Primary language:  English     needed? No   [employer]:  [phone] op. 1  Other communication barriers:Glasses    Preferred Method of Communication:     Current living arrangement: I live alone    Mobility Status/ Medical Equipment: Independent        Health Maintenance  Health Maintenance Reviewed: Due/Overdue   Health Maintenance Due   Topic Date Due    ALT  Never done    HF ACTION PLAN  Never done    RSV VACCINE (Pregnancy & 60+) (1 - 1-dose 60+ series) Never done    MEDICARE ANNUAL WELLNESS VISIT  01/31/2023    PHQ-2 (once per calendar year)  01/01/2024    LIPID  02/22/2024    MICROALBUMIN  02/22/2024    TSH W/FREE T4 REFLEX  02/22/2024           My Access Plan  Medical Emergency 911   Primary Clinic Line [facility]  [facility] [phone]   24 Hour Appointment Line [phone] or  [phone]) (toll-free)   24 Hour Nurse Line [phone] (toll-free)   Preferred Urgent Care No data recorded   Preferred Hospital [facility]  [phone]     Preferred Pharmacy [facility] - [place], [place] - [address]      Behavioral Health Crisis Line The National Suicide Prevention Lifeline at 1-792.616.4429 or Text/Call 638           My Care Team Members  Patient Care Team         Relationship Specialty Notifications Start End    Kenna Garcia PA-C PCP - General Physician Assistant  5/6/19     Phone: [phone] Fax: [phone]         [address] [place] [address]    Kenna Garcia PA-C Assigned PCP   10/18/18     Phone: [phone] Fax: [phone]         [address] [place] [facility] [address]    BELLO Haas MD MD Cardiovascular Disease  8/18/22     Phone: [phone] Fax: [phone]         [address] [address]    Daniel Mcgregor, RN Lead Care Coordinator Primary Care - CC Admissions 10/27/23     Phone: [phone] Fax: [phone]        Cherie Barrera NP Assigned Heart and Vascular Provider   11/4/23     Phone: [phone] Fax: [phone]         [address] [address]                My Care Plans  Self Management and Treatment Plan    Care Plan  Care Plan: General - increase strength and stamina       Problem: HP GENERAL PROBLEM       Goal: General Goal - increase strength and stamina       Start Date: 10/27/2023    This Visit's Progress: 20% Recent Progress: 20%    Note:     Barriers: Recent surgery for aortic valve replacement.  Strengths: Engaged in care coordination  Patient expressed understanding of goal: Yes  Action steps to achieve this goal:  1. I will make and attend all cardiac rehab appointments  2. I will I will  practice the exercises from cardiac rehab between sessions.  3. I will walk around my home gradually adding time walked and/or distance walked as tolerated.                              Care Plan: General - AWV       Problem: HP GENERAL PROBLEM       Goal: General Goal - AWV       Start Date: 10/27/2023    This Visit's Progress: 10% Recent Progress: 10%    Note:     Goal Statement: I will complete my annual wellness visit.    Barriers: Recent surgery to replace aortic valve  Strengths: Engaged in care coordination    Patient expressed understanding of goal: yes  Action steps to achieve this goal:  1. I will schedule my annual wellness visit; [phone] ([phone])  2. I will attend my annual wellness visit.  3. I will contact my Care Management or clinic team if I have barriers to attending my annual wellness visit.                                 Action Plans on File:                       Advance Care Plans/Directives:   Advanced Care Plan/Directives on file:   Yes    Status of Document(s):   On File and Validated    Advanced Care Plan/Directives Type:   Advanced Directive - On File           My Medical and Care Information  Problem List   Patient Active Problem List   Diagnosis    Hypothyroidism due to acquired atrophy of thyroid    Hyperlipidemia LDL goal <130    Essential hypertension with goal blood pressure less than 140/90    Osteopenia of both hips    Nonrheumatic aortic valve stenosis    Decreased GFR    S/P TAVR (transcatheter aortic valve replacement)      Current Medications and Allergies:  See printed Medication Report.    Care Coordination Start Date: 10/27/2023   Frequency of Care Coordination: monthly, more frequently as needed     Form Last Updated: 02/02/2024

## 2024-02-05 ENCOUNTER — TELEPHONE (OUTPATIENT)
Dept: FAMILY MEDICINE | Facility: CLINIC | Age: 79
End: 2024-02-05
Payer: COMMERCIAL

## 2024-02-05 NOTE — TELEPHONE ENCOUNTER
Patient Quality Outreach    Patient is due for the following:   Physical Preventive Adult Physical    Next Steps:   Schedule a Annual Wellness Visit    Type of outreach:    Pt made appointment for  3/26/24       Questions for provider review:    None           Ct Lopes CMA

## 2024-02-06 DIAGNOSIS — E78.5 HYPERLIPIDEMIA LDL GOAL <130: ICD-10-CM

## 2024-02-06 DIAGNOSIS — E03.4 HYPOTHYROIDISM DUE TO ACQUIRED ATROPHY OF THYROID: ICD-10-CM

## 2024-02-07 RX ORDER — LEVOTHYROXINE SODIUM 112 UG/1
112 TABLET ORAL DAILY
Qty: 90 TABLET | Refills: 0 | Status: SHIPPED | OUTPATIENT
Start: 2024-02-07 | End: 2024-05-01

## 2024-02-07 RX ORDER — SIMVASTATIN 10 MG
10 TABLET ORAL AT BEDTIME
Qty: 90 TABLET | Refills: 0 | Status: SHIPPED | OUTPATIENT
Start: 2024-02-07 | End: 2024-04-29

## 2024-02-16 NOTE — NURSING NOTE
[facility] Cardiomyopathy Questionnaire  (CQ-12)  Date: 11/27/2023    30-day post-TAVR    1.  A.  5      B.  5       C.  5  2.  5  3.  7  4.  7  5.  7  6.  5  7.  5  8.  A. 5     B.  5     C.  5

## 2024-03-15 ENCOUNTER — TELEPHONE (OUTPATIENT)
Dept: FAMILY MEDICINE | Facility: CLINIC | Age: 79
End: 2024-03-15
Payer: COMMERCIAL

## 2024-03-18 NOTE — TELEPHONE ENCOUNTER
General Call    Contacts         Type Contact Phone/Fax    03/15/2024 09:30 PM CDT Phone (Incoming) Jenna Kaur (Self) [phone] (M)          Reason for Call: Pt has had a cough for a few days and is concerned but not find any available appts. Can you call pt if Dr. Garcia can see her soon? Thank you!    Could we send this information to you in [employer] or would you prefer to receive a phone call?:   Patient would prefer a phone call   Okay to leave a detailed message?: Yes at [phone]  
Ok to use one of the open slots for tomorrow, 3/19  
2

## 2024-03-19 ENCOUNTER — OFFICE VISIT (OUTPATIENT)
Dept: FAMILY MEDICINE | Facility: CLINIC | Age: 79
End: 2024-03-19
Payer: COMMERCIAL

## 2024-03-19 VITALS
BODY MASS INDEX: 27.66 KG/M2 | SYSTOLIC BLOOD PRESSURE: 124 MMHG | RESPIRATION RATE: 16 BRPM | TEMPERATURE: 97.1 F | OXYGEN SATURATION: 98 % | DIASTOLIC BLOOD PRESSURE: 70 MMHG | HEIGHT: 65 IN | HEART RATE: 64 BPM | WEIGHT: 166 LBS

## 2024-03-19 DIAGNOSIS — J06.9 VIRAL URI WITH COUGH: Primary | ICD-10-CM

## 2024-03-19 PROCEDURE — 99213 OFFICE O/P EST LOW 20 MIN: CPT | Performed by: PHYSICIAN ASSISTANT

## 2024-03-19 RX ORDER — RESPIRATORY SYNCYTIAL VIRUS VACCINE 120MCG/0.5
0.5 KIT INTRAMUSCULAR ONCE
Qty: 1 EACH | Refills: 0 | Status: CANCELLED | OUTPATIENT
Start: 2024-03-19 | End: 2024-03-19

## 2024-03-19 SDOH — HEALTH STABILITY: PHYSICAL HEALTH: ON AVERAGE, HOW MANY MINUTES DO YOU ENGAGE IN EXERCISE AT THIS LEVEL?: 30 MIN

## 2024-03-19 SDOH — HEALTH STABILITY: PHYSICAL HEALTH: ON AVERAGE, HOW MANY DAYS PER WEEK DO YOU ENGAGE IN MODERATE TO STRENUOUS EXERCISE (LIKE A BRISK WALK)?: 5 DAYS

## 2024-03-19 ASSESSMENT — SOCIAL DETERMINANTS OF HEALTH (SDOH): HOW OFTEN DO YOU GET TOGETHER WITH FRIENDS OR RELATIVES?: MORE THAN THREE TIMES A WEEK

## 2024-03-19 NOTE — PATIENT INSTRUCTIONS
Mucinex twice daily x5 days. Will help with mucous and drainage.  Afrin nasal spray twice daily x3 days. This will help dry your sinuses up.  Deslym twice daily as needed. Cough suppressant.     Call [facility] Friday morning if you haven't seen continued improvement.   [phone]

## 2024-03-19 NOTE — PROGRESS NOTES
"  Assessment & Plan       ICD-10-CM    1. Viral URI with cough  J06.9           Likely viral. Lungs sound clear. Supportive therapy also discussed. Follow up if symptoms fail to improve or worsen.      Patient Instructions   Mucinex twice daily x5 days. Will help with mucous and drainage.  Afrin nasal spray twice daily x3 days. This will help dry your sinuses up.  Deslym twice daily as needed. Cough suppressant.     Call [facility] Friday morning if you haven't seen continued improvement.   [phone]      BMI  Estimated body mass index is 27.96 kg/m  as calculated from the following:    Height as of this encounter: 1.641 m (5' 4.61\").    Weight as of this encounter: 75.3 kg (166 lb).     Return in 3 days (on 3/22/2024), or if symptoms worsen or fail to improve.        Arely West is a 78 year old, presenting for the following health issues:  URI        3/19/2024    10:54 AM   Additional Questions   Roomed by Sandra   Accompanied by Self         3/19/2024    10:54 AM   Patient Reported Additional Medications   Patient reports taking the following new medications Cough Syrup and Cough Drops       Patient arrived to discuss ongoing symptoms of Productive Cough and Chest Congestion x 1 week. No known ill contacts per pt.     Denies fever or sore throat. Patient has been taking Cough Syrup and Cough Drops with temporary relief.    History of Present Illness       Reason for visit:  Cough and possible sinus infection  Symptom onset:  1-2 weeks ago  Symptoms include:  Cough and bring up a lot of phlegm  Symptom intensity:  Moderate  Symptom progression:  Improving  Had these symptoms before:  No  What makes it worse:  No  What makes it better:  Continue to drink a lot of fluids    She eats 2-3 servings of fruits and vegetables daily.She consumes 1 sweetened beverage(s) daily.She exercises with enough effort to increase her heart rate 30 to 60 minutes per day.  She exercises with enough effort to increase " "her heart rate 5 days per week.   She is taking medications regularly.       Sxs started 2 Fridays ago, ~10 days  Bringing up a lot of phlegm  Drainage down the throat from sinuses  Sinus congestion is somewhat better, still has a lot of drainage though  Phlegm in throat, high in chest, not lower chest  Cough has improved the last 2 nights, was able to sleep        Review of Systems  Constitutional, HEENT, cardiovascular, pulmonary, gi and gu systems are negative, except as otherwise noted.      Objective    /70 (BP Location: Right arm, Patient Position: Chair, Cuff Size: Adult Regular)   Pulse 64   Temp 97.1  F (36.2  C) (Tympanic)   Resp 16   Ht 1.641 m (5' 4.61\")   Wt 75.3 kg (166 lb)   SpO2 98%   BMI 27.96 kg/m    Body mass index is 27.96 kg/m .  Physical Exam   GENERAL: alert and no distress  EYES: Eyes grossly normal to inspection  HENT: ear canals and TM's normal, nose and mouth without ulcers or lesions  NECK: no adenopathy, no asymmetry, masses, or scars  RESP: lungs clear to auscultation - no rales, rhonchi or wheezes  CV: regular rates and rhythm, normal S1 S2, no S3 or S4, and no murmur, click or rub  MS: no gross musculoskeletal defects noted, no edema  SKIN: no suspicious lesions or rashes  NEURO: Normal strength and tone, mentation intact and speech normal  PSYCH: mentation appears normal, affect normal/bright          Signed Electronically by: Kenna Garcia PA-C    "

## 2024-03-26 ENCOUNTER — OFFICE VISIT (OUTPATIENT)
Dept: FAMILY MEDICINE | Facility: CLINIC | Age: 79
End: 2024-03-26
Payer: COMMERCIAL

## 2024-03-26 VITALS
HEART RATE: 61 BPM | SYSTOLIC BLOOD PRESSURE: 136 MMHG | DIASTOLIC BLOOD PRESSURE: 72 MMHG | TEMPERATURE: 96.8 F | HEIGHT: 65 IN | OXYGEN SATURATION: 99 % | BODY MASS INDEX: 27.49 KG/M2 | WEIGHT: 165 LBS | RESPIRATION RATE: 16 BRPM

## 2024-03-26 DIAGNOSIS — I10 ESSENTIAL HYPERTENSION WITH GOAL BLOOD PRESSURE LESS THAN 140/90: ICD-10-CM

## 2024-03-26 DIAGNOSIS — Z00.00 ENCOUNTER FOR MEDICARE ANNUAL WELLNESS EXAM: Primary | ICD-10-CM

## 2024-03-26 DIAGNOSIS — E03.4 HYPOTHYROIDISM DUE TO ACQUIRED ATROPHY OF THYROID: ICD-10-CM

## 2024-03-26 DIAGNOSIS — E78.5 HYPERLIPIDEMIA LDL GOAL <130: ICD-10-CM

## 2024-03-26 LAB
ALBUMIN SERPL BCG-MCNC: 4.6 G/DL (ref 3.5–5.2)
ALP SERPL-CCNC: 101 U/L (ref 40–150)
ALT SERPL W P-5'-P-CCNC: 14 U/L (ref 0–50)
ANION GAP SERPL CALCULATED.3IONS-SCNC: 10 MMOL/L (ref 7–15)
AST SERPL W P-5'-P-CCNC: 19 U/L (ref 0–45)
BILIRUB SERPL-MCNC: 0.5 MG/DL
BUN SERPL-MCNC: 18.4 MG/DL (ref 8–23)
CALCIUM SERPL-MCNC: 10.1 MG/DL (ref 8.8–10.2)
CHLORIDE SERPL-SCNC: 102 MMOL/L (ref 98–107)
CHOLEST SERPL-MCNC: 163 MG/DL
CREAT SERPL-MCNC: 0.83 MG/DL (ref 0.51–0.95)
CREAT UR-MCNC: 107 MG/DL
DEPRECATED HCO3 PLAS-SCNC: 27 MMOL/L (ref 22–29)
EGFRCR SERPLBLD CKD-EPI 2021: 71 ML/MIN/1.73M2
FASTING STATUS PATIENT QL REPORTED: YES
GLUCOSE SERPL-MCNC: 119 MG/DL (ref 70–99)
HDLC SERPL-MCNC: 51 MG/DL
LDLC SERPL CALC-MCNC: 84 MG/DL
MICROALBUMIN UR-MCNC: 17.3 MG/L
MICROALBUMIN/CREAT UR: 16.17 MG/G CR (ref 0–25)
NONHDLC SERPL-MCNC: 112 MG/DL
POTASSIUM SERPL-SCNC: 4.7 MMOL/L (ref 3.4–5.3)
PROT SERPL-MCNC: 7.6 G/DL (ref 6.4–8.3)
SODIUM SERPL-SCNC: 139 MMOL/L (ref 135–145)
T4 FREE SERPL-MCNC: 1.6 NG/DL (ref 0.9–1.7)
TRIGL SERPL-MCNC: 140 MG/DL
TSH SERPL DL<=0.005 MIU/L-ACNC: 0.24 UIU/ML (ref 0.3–4.2)

## 2024-03-26 PROCEDURE — 84439 ASSAY OF FREE THYROXINE: CPT | Performed by: PHYSICIAN ASSISTANT

## 2024-03-26 PROCEDURE — 82570 ASSAY OF URINE CREATININE: CPT | Performed by: PHYSICIAN ASSISTANT

## 2024-03-26 PROCEDURE — 36415 COLL VENOUS BLD VENIPUNCTURE: CPT | Performed by: PHYSICIAN ASSISTANT

## 2024-03-26 PROCEDURE — 80061 LIPID PANEL: CPT | Performed by: PHYSICIAN ASSISTANT

## 2024-03-26 PROCEDURE — 80053 COMPREHEN METABOLIC PANEL: CPT | Performed by: PHYSICIAN ASSISTANT

## 2024-03-26 PROCEDURE — 82043 UR ALBUMIN QUANTITATIVE: CPT | Performed by: PHYSICIAN ASSISTANT

## 2024-03-26 PROCEDURE — 99214 OFFICE O/P EST MOD 30 MIN: CPT | Mod: 25 | Performed by: PHYSICIAN ASSISTANT

## 2024-03-26 PROCEDURE — G0439 PPPS, SUBSEQ VISIT: HCPCS | Performed by: PHYSICIAN ASSISTANT

## 2024-03-26 PROCEDURE — 84443 ASSAY THYROID STIM HORMONE: CPT | Performed by: PHYSICIAN ASSISTANT

## 2024-03-26 RX ORDER — RESPIRATORY SYNCYTIAL VIRUS VACCINE 120MCG/0.5
0.5 KIT INTRAMUSCULAR ONCE
Qty: 1 EACH | Refills: 0 | Status: CANCELLED | OUTPATIENT
Start: 2024-03-26 | End: 2024-03-26

## 2024-03-26 NOTE — PROGRESS NOTES
"Preventive Care Visit  [facility] LUCAS Garcia PA-C, Family Medicine  Mar 26, 2024      Assessment & Plan       ICD-10-CM    1. Encounter for Medicare annual wellness exam  Z00.00       2. Hyperlipidemia LDL goal <130  E78.5 Lipid panel reflex to direct LDL Non-fasting      3. Hypothyroidism due to acquired atrophy of thyroid  E03.4 TSH WITH FREE T4 REFLEX      4. Essential hypertension with goal blood pressure less than 140/90  I10 Albumin Random Urine Quantitative with Creat Ratio     Comprehensive metabolic panel (BMP + Alb, Alk Phos, ALT, AST, Total. Bili, TP)          1) Screenings discussed. She has stopped colon cancer screening, but will continue mammos for another few years.    2-4) Blood pressure at goal. Routine labs in process. Meds renewed, no changes        Ordering of each unique test  Prescription drug management    BMI  Estimated body mass index is 27.79 kg/m  as calculated from the following:    Height as of this encounter: 1.641 m (5' 4.61\").    Weight as of this encounter: 74.8 kg (165 lb).     Counseling  Appropriate preventive services were discussed with this patient, including applicable screening as appropriate for fall prevention, nutrition, physical activity, Tobacco-use cessation, weight loss and cognition.  Checklist reviewing preventive services available has been given to the patient.  Reviewed patient's diet, addressing concerns and/or questions.   Patient reported safety concerns were addressed today.    Return in about 1 year (around 3/26/2025) for your annual physical, a med check, fasting labs, with Kenna, in person.        Arely West is a 78 year old, presenting for the following:  Physical        3/26/2024     7:46 AM   Additional Questions   Roomed by Sandra   Accompanied by Self         3/26/2024     7:46 AM   Patient Reported Additional Medications   Patient reports taking the following new medications          Health Care " Directive  Patient has a Health Care Directive on file  Advance care planning document is on file and is current.    HPI  Patient with history of hyperlipidemia, hypertension and hypothyroidism arrived for Annual Physical.    Patient is fasting for lab work.     Pt wanting to follow-up with her Hypertension, pt feels her BP home readings have been lower around 110/70.     Sees cards again in May    Hyperlipidemia Follow-Up    Are you regularly taking any medication or supplement to lower your cholesterol?   Yes- Simvastatin  Are you having muscle aches or other side effects that you think could be caused by your cholesterol lowering medication?  No    Hypertension Follow-up    Do you check your blood pressure regularly outside of the clinic? Yes   Are you following a low salt diet? Yes  Are your blood pressures ever more than 140 on the top number (systolic) OR more   than 90 on the bottom number (diastolic), for example 140/90? No - averaging 110/70 at home      Hypothyroidism Follow-up    Since last visit, patient describes the following symptoms: Weight stable, no hair loss, no skin changes, no constipation, no loose stools        3/19/2024   General Health   How would you rate your overall physical health? Excellent   Feel stress (tense, anxious, or unable to sleep) Not at all         3/19/2024   Nutrition   Diet: Regular (no restrictions)         3/19/2024   Exercise   Days per week of moderate/strenous exercise 5 days   Average minutes spent exercising at this level 30 min         3/19/2024   Social Factors   Frequency of gathering with friends or relatives More than three times a week   Worry food won't last until get money to buy more No   Food not last or not have enough money for food? No   Do you have housing?  Yes   Are you worried about losing your housing? No   Lack of transportation? No   Unable to get utilities (heat,electricity)? No         3/19/2024   Fall Risk   Fallen 2 or more times in the past  year? No   Trouble with walking or balance? No          3/19/2024   Activities of Daily Living- Home Safety   Needs help with the following daily activites None of the above   Safety concerns in the home No grab bars in the bathroom         3/19/2024   Dental   Dentist two times every year? Yes         3/19/2024   Hearing Screening   Hearing concerns? None of the above         3/19/2024   Driving Risk Screening   Patient/family members have concerns about driving No         3/19/2024   General Alertness/Fatigue Screening   Have you been more tired than usual lately? No         3/19/2024   Urinary Incontinence Screening   Bothered by leaking urine in past 6 months No         3/19/2024   TB Screening   Were you born outside of the [place]? No         Today's PHQ-2 Score:       3/25/2024     9:36 AM   PHQ-2 ( 1999 Pf[employer])   Q1: Little interest or pleasure in doing things 0   Q2: Feeling down, depressed or hopeless 0   PHQ-2 Score 0   Q1: Little interest or pleasure in doing things Not at all   Q2: Feeling down, depressed or hopeless Not at all   PHQ-2 Score 0           3/19/2024   Substance Use   Alcohol more than 3/day or more than 7/wk No   Do you have a current opioid prescription? No   How severe/bad is pain from 1 to 10? 0/10 (No Pain)   Do you use any other substances recreationally? No     Social History     Tobacco Use    Smoking status: Never     Passive exposure: Past    Smokeless tobacco: Never   Vaping Use    Vaping Use: Never used   Substance Use Topics    Alcohol use: Never    Drug use: Never           8/15/2023   LAST FHS-7 RESULTS   1st degree relative breast or ovarian cancer Yes   Any relative bilateral breast cancer No   Any male have breast cancer No   Any ONE woman have BOTH breast AND ovarian cancer No   Any woman with breast cancer before 50yrs No   2 or more relatives with breast AND/OR ovarian cancer Yes   2 or more relatives with breast AND/OR bowel cancer Yes        Mammogram Screening - After age  74- determine frequency with patient based on health status, life expectancy and patient goals          Reviewed and updated as needed this visit by Provider                    Current providers sharing in care for this patient include:  Patient Care Team:  Kenna Garcia PA-C as PCP - General (Physician Assistant)  Kenna Garcia PA-C as Assigned PCP  BELLO Haas MD as MD (Cardiovascular Disease)  Daniel Mcgregor, RN as Lead Care Coordinator (Primary Care - CC)  Cherie Barrera NP as Assigned Heart and Vascular Provider    The following health maintenance items are reviewed in Epic and correct as of today:  Health Maintenance   Topic Date Due    ALT  Never done    HF ACTION PLAN  Never done    RSV VACCINE (Pregnancy & 60+) (1 - 1-dose 60+ series) Never done    MEDICARE ANNUAL WELLNESS VISIT  01/31/2023    LIPID  02/22/2024    MICROALBUMIN  02/22/2024    TSH W/FREE T4 REFLEX  02/22/2024    BMP  05/27/2024    MAMMO SCREENING  08/15/2024    CBC  11/27/2024    ANNUAL REVIEW OF HM ORDERS  03/19/2025    FALL RISK ASSESSMENT  03/26/2025    GLUCOSE  11/27/2026    ADVANCE CARE PLANNING  03/26/2029    DTAP/TDAP/TD IMMUNIZATION (3 - Td or Tdap) 01/31/2032    PHQ-2 (once per calendar year)  Completed    INFLUENZA VACCINE  Completed    Pneumococcal Vaccine: 65+ Years  Completed    ZOSTER IMMUNIZATION  Completed    COVID-19 Vaccine  Completed    IPV IMMUNIZATION  Aged Out    HPV IMMUNIZATION  Aged Out    MENINGITIS IMMUNIZATION  Aged Out    RSV MONOCLONAL ANTIBODY  Aged Out    DEXA  Discontinued    COLORECTAL CANCER SCREENING  Discontinued         Review of Systems  Constitutional, neuro, ENT, endocrine, pulmonary, cardiac, gastrointestinal, genitourinary, musculoskeletal, integument and psychiatric systems are negative, except as otherwise noted.     Objective    Exam  /72 (BP Location: Right arm, Patient Position: Chair, Cuff Size: Adult Regular)   Pulse 61   Temp 96.8  F (36  C) (Tympanic)    "Resp 16   Ht 1.641 m (5' 4.61\")   Wt 74.8 kg (165 lb)   SpO2 99%   BMI 27.79 kg/m     Estimated body mass index is 27.79 kg/m  as calculated from the following:    Height as of this encounter: 1.641 m (5' 4.61\").    Weight as of this encounter: 74.8 kg (165 lb).    Physical Exam  GENERAL: alert and no distress  EYES: Eyes grossly normal to inspection  HENT: ear canals and TM's normal, nose and mouth without ulcers or lesions  NECK: no adenopathy, no asymmetry, masses, or scars  RESP: lungs clear to auscultation - no rales, rhonchi or wheezes  BREAST: normal without masses, tenderness or nipple discharge and no palpable axillary masses or adenopathy  CV: regular rates and rhythm, normal S1 S2, no S3 or S4, and no murmur, click or rub  ABDOMEN: soft, nontender, no hepatosplenomegaly, no masses and bowel sounds normal  MS: no gross musculoskeletal defects noted, no edema  SKIN: no suspicious lesions or rashes  NEURO: Normal strength and tone, mentation intact and speech normal  PSYCH: mentation appears normal, affect normal/bright         3/26/2024   Mini Cog   Clock Draw Score 2 Normal   3 Item Recall 3 objects recalled   Mini Cog Total Score 5              Signed Electronically by: Kenna Garcia PA-C    "

## 2024-04-05 ENCOUNTER — PATIENT OUTREACH (OUTPATIENT)
Dept: CARE COORDINATION | Facility: CLINIC | Age: 79
End: 2024-04-05
Payer: COMMERCIAL

## 2024-04-05 NOTE — PROGRESS NOTES
Clinic Care Coordination Contact  [facility]/Voicemail    Clinical Data: Care Coordinator Outreach    Outreach Documentation Number of Outreach Attempt   4/5/2024   2:39 PM 1       Left message on patient's voicemail with call back information and requested return call.    Plan: Care Coordinator sent care coordination introduction letter on 10/27/23 via [employer]Run. Care Coordinator will try to reach patient again in 3-5 business days.    Daniel Cho MSN, RN, PHN, [facility]   Primary Care Clinical RN Care Coordinator  [facility]  4/5/2024   2:39 PM  [email or web address]  Office: [phone]       69

## 2024-04-11 ENCOUNTER — PATIENT OUTREACH (OUTPATIENT)
Dept: CARE COORDINATION | Facility: CLINIC | Age: 79
End: 2024-04-11
Payer: COMMERCIAL

## 2024-04-11 NOTE — PROGRESS NOTES
Clinic Care Coordination Contact    Assessment: Care Coordinator contacted patient for 2 month follow up.  Patient has continued to follow the plan of care and assessment is negative for any new needs or concerns.    Enrollment status: Graduated.      Plan: No further outreaches at this time.  Patient will continue to follow the plan of care.  If new needs arise a new Care Coordination referral may be placed.  FYI to PCP    Daniel Cho MSN, RN, PHN, CCM   Primary Care Clinical RN Care Coordinator  [facility]  4/11/2024   9:37 AM  [email or web address]  Office: [phone]

## 2024-04-29 DIAGNOSIS — E78.5 HYPERLIPIDEMIA LDL GOAL <130: ICD-10-CM

## 2024-04-29 RX ORDER — SIMVASTATIN 10 MG
10 TABLET ORAL AT BEDTIME
Qty: 90 TABLET | Refills: 0 | Status: SHIPPED | OUTPATIENT
Start: 2024-04-29 | End: 2024-07-22

## 2024-05-01 DIAGNOSIS — E03.4 HYPOTHYROIDISM DUE TO ACQUIRED ATROPHY OF THYROID: ICD-10-CM

## 2024-05-01 RX ORDER — LEVOTHYROXINE SODIUM 112 UG/1
112 TABLET ORAL DAILY
Qty: 90 TABLET | Refills: 2 | Status: SHIPPED | OUTPATIENT
Start: 2024-05-01

## 2024-05-02 ENCOUNTER — OFFICE VISIT (OUTPATIENT)
Dept: CARDIOLOGY | Facility: CLINIC | Age: 79
End: 2024-05-02
Attending: NURSE PRACTITIONER
Payer: COMMERCIAL

## 2024-05-02 VITALS — WEIGHT: 167.1 LBS | SYSTOLIC BLOOD PRESSURE: 132 MMHG | DIASTOLIC BLOOD PRESSURE: 73 MMHG | BODY MASS INDEX: 28.14 KG/M2

## 2024-05-02 DIAGNOSIS — Z95.2 S/P TAVR (TRANSCATHETER AORTIC VALVE REPLACEMENT): Primary | ICD-10-CM

## 2024-05-02 DIAGNOSIS — I48.0 PAROXYSMAL ATRIAL FIBRILLATION (H): ICD-10-CM

## 2024-05-02 DIAGNOSIS — I10 ESSENTIAL HYPERTENSION WITH GOAL BLOOD PRESSURE LESS THAN 140/90: ICD-10-CM

## 2024-05-02 DIAGNOSIS — I45.10 RBBB: ICD-10-CM

## 2024-05-02 PROCEDURE — 93010 ELECTROCARDIOGRAM REPORT: CPT | Performed by: INTERNAL MEDICINE

## 2024-05-02 PROCEDURE — 99214 OFFICE O/P EST MOD 30 MIN: CPT | Performed by: NURSE PRACTITIONER

## 2024-05-02 PROCEDURE — G0463 HOSPITAL OUTPT CLINIC VISIT: HCPCS | Performed by: NURSE PRACTITIONER

## 2024-05-02 PROCEDURE — 93005 ELECTROCARDIOGRAM TRACING: CPT | Mod: XU

## 2024-05-02 PROCEDURE — 93242 EXT ECG>48HR<7D RECORDING: CPT | Performed by: NURSE PRACTITIONER

## 2024-05-02 RX ORDER — CARVEDILOL 6.25 MG/1
6.25 TABLET ORAL 2 TIMES DAILY WITH MEALS
Qty: 180 TABLET | Refills: 3 | Status: SHIPPED | OUTPATIENT
Start: 2024-05-02

## 2024-05-02 ASSESSMENT — PAIN SCALES - GENERAL: PAINLEVEL: NO PAIN (0)

## 2024-05-02 NOTE — LETTER
5/2/2024      RE: Jenna Kaur  [address] [place] [address]       Dear Colleague,    Thank you for the opportunity to participate in the care of your patient, Jenna Kaur, at the [facility] [place] [facility]. Please see a copy of my visit note below.        [facility] HEART CARE  CARDIOVASCULAR DIVISION    VALVE CLINIC RETURN VISIT    PRIMARY CARDIOLOGIST: Dr. Orellana       PERTINENT CLINICAL HISTORY:     Jenna Kaur is a very pleasant 78 year old female who presents for 6 month TAVR follow-up. She has a history of HTN, HLD, bifascicular block and severe aortic valvular stenosis that was treated with a transfemoral transcatheter aortic valve replacement (TAVR) with a 26 mm Catalan Jae 3 Resilia on 10/25/23 by Lucia Leyva and Lamont.  The TAVR and post-procedural course were notable for no complications. Her post procedure ECG showed stable bifasicular block and post procedure echo showed normal TAVR function with mean PG of 9 mmhg with trace PVL. She was discharged on ASA monotherapy and cardiac monitor given higher risk of CHB with baseline block. She was seen one week post TAVR and noted to have new onset AF with RVR, she was started on Eliquis 5 mg BID and Coreg 6.25 mg BID, with plans to discuss DCCV is she remained in AF at her 1 month follow-up visit. She was seen at 1 month and had converted to NSR. She was continued on AC and rate control. TAVR function looked normal on 1 month echo.     She presents today with no specific cardiovascular concerns. She completed 1 month of cardiac rehab and has continued to exercise 5 days a week. She walks 30 minutes on the treadmill 5 days a week and partipates in exercise class 3 days a week for an additional 30 minutes. She has also been watching what she eats and has lost 20 lbs over the past 6 months. She has no chest pain, shortness of breath, orthopnea or PND.  She occasionally feels dizzy at the end of her exercise regimen. Has to stand for a few minutes and dizziness resolves.  She has not had any palpitations, everardo syncope. Her BP has been low at home anywhere from 83//70s.      She continues to take losartan 50 mg daily and Coreg 12.5 mg BID      PAST MEDICAL HISTORY:     HTN  HLD  RBBB, LAD, 1st degree AVB  CKD  Severe aortic stenosis s/p TAVR      PAST SURGICAL HISTORY:     Past Surgical History:   Procedure Laterality Date     ABDOMEN SURGERY       CHOLECYSTECTOMY       COLONOSCOPY       CV CORONARY ANGIOGRAM N/A 10/2/2023    Procedure: Coronary Angiogram;  Surgeon: Garett Real MD;  Location:  HEART CARDIAC CATH LAB     CV PCI N/A 10/2/2023    Procedure: Percutaneous Coronary Intervention;  Surgeon: Garett Real MD;  Location:  HEART CARDIAC CATH LAB     CV TRANSCATHETER AORTIC VALVE REPLACEMENT-FEMORAL APPROACH N/A 10/25/2023    Procedure: Transcatheter aortic valve replacement with any indicated procedure. (Catalan);  Surgeon: Colton Leyva MD;  Location:  OR     EYE SURGERY  Cataract surgery 11/2 & 11/16/2021     OR TRANSCATHETER AORTIC VALVE REPLACEMENT, FEMORAL PERCUTANEOUS APPROACH (STANDBY) N/A 10/25/2023    Procedure: OR TRANSCATHETER AORTIC VALVE REPLACEMENT, OPEN FEMORAL ARTERY APPROACH- Catalan;  Surgeon: William Abernathy MD;  Location:  OR        CURRENT MEDICATIONS:     Current Outpatient Medications   Medication Sig Dispense Refill     amoxicillin (AMOXIL) 500 MG capsule Take 4 capsules (2,000 mg) by mouth once as needed (SBE prophyalxis take 30-60 minutes prior to dental procedure or cleaning) 4 capsule 3     apixaban ANTICOAGULANT (ELIQUIS) 5 MG tablet Take 1 tablet (5 mg) by mouth 2 times daily 180 tablet 3     Calcium Carbonate-Vitamin D (CALCIUM 500 + D PO) Take 2 tablets by mouth daily Takes 2 tablets       carvedilol (COREG) 12.5 MG tablet Take 1 tablet (12.5 mg) by mouth 2 times daily (with  meals) 180 tablet 1     levothyroxine (SYNTHROID/LEVOTHROID) 112 MCG tablet TAKE ONE TABLET BY MOUTH ONCE DAILY 90 tablet 2     losartan (COZAAR) 50 MG tablet Take 1 tablet (50 mg) by mouth daily 90 tablet 3     polyethylene glycol (MIRALAX/GLYCOLAX) powder Take 17 g by mouth daily as needed       simvastatin (ZOCOR) 10 MG tablet TAKE ONE TABLET BY MOUTH EVERY NIGHT AT BEDTIME 90 tablet 0        ALLERGIES:     Allergies   Allergen Reactions     Erythromycin GI Disturbance     Nausea and sick feeling     Magnesium      States she got diarrhea when given this at the hospital and wants that in her chart        FAMILY HISTORY:     Family History   Problem Relation Age of Onset     Breast Cancer Mother 70     Colon Cancer Father 59     Arrhythmia Brother      Sleep Apnea Brother         SOCIAL HISTORY:     Social History     Socioeconomic History     Marital status:    Tobacco Use     Smoking status: Never     Smokeless tobacco: Never   Vaping Use     Vaping Use: Never used   Substance and Sexual Activity     Alcohol use: Never     Drug use: Never     Sexual activity: Never   Other Topics Concern     Parent/sibling w/ CABG, MI or angioplasty before 65F 55M? No        REVIEW OF SYSTEMS:     Constitutional: No fevers or chills  Skin: No new rash or itching  Eyes: No acute change in vision  Ears/Nose/Throat: No purulent rhinorrhea, new hearing loss, or new vertigo  Respiratory: No cough or hemoptysis  Cardiovascular: See HPI  Gastrointestinal: No change in appetite, vomiting, hematemesis or diarrhea  Genitourinary: No dysuria or hematuria  Musculoskeletal: No new back pain, neck pain or muscle pain  Neurologic: No new headaches, focal weakness or behavior changes  Psychiatric: No hallucinations, excessive alcohol consumption or illegal drug usage  Hematologic/Lymphatic/Immunologic: No bleeding, chills, fever, night sweats or weight loss  Endocrine: No new cold intolerance, heat intolerance, polyphagia, polydipsia or  polyuria      PHYSICAL EXAMINATION:     /73 (BP Location: Left arm, Patient Position: Chair, Cuff Size: Adult Regular)   Wt 75.8 kg (167 lb 1.6 oz)   BMI 28.14 kg/m      GENERAL: No acute distress.  HEENT: EOMI. Sclerae white, not injected. Nares clear. Pharynx without erythema or exudate.   Neck: No adenopathy. No thyromegaly. No jugular venous distension.   Heart: Irregularly irregular No murmur.   Lungs: Clear to auscultation. No ronchi, wheezes, rales.   Abdomen: Soft, nontender, nondistended. Bowel sounds present.  Extremities: No clubbing, cyanosis, or edema.   Neurologic: Alert and oriented to person/place/time, normal speech and affect. No focal deficits.  Skin: No petechiae, purpura or rash.     LABORATORY DATA:     LIPID RESULTS:  Lab Results   Component Value Date    CHOL 163 03/26/2024    CHOL 162 12/11/2020    HDL 51 03/26/2024    HDL 65 12/11/2020    LDL 84 03/26/2024    LDL 76 12/11/2020    TRIG 140 03/26/2024    TRIG 106 12/11/2020       LIVER ENZYME RESULTS:  Lab Results   Component Value Date    AST 19 03/26/2024    ALT 14 03/26/2024       CBC RESULTS:  Lab Results   Component Value Date    WBC 9.3 11/27/2023    WBC 7.1 11/01/2018    RBC 4.80 11/27/2023    RBC 4.61 11/01/2018    HGB 15.3 11/27/2023    HGB 14.3 11/01/2018    HCT 43.5 11/27/2023    HCT 42.5 11/01/2018    MCV 91 11/27/2023    MCV 92 11/01/2018    MCH 31.9 11/27/2023    MCH 31.0 11/01/2018    MCHC 35.2 11/27/2023    MCHC 33.6 11/01/2018    RDW 11.7 11/27/2023    RDW 12.2 11/01/2018     11/27/2023     11/01/2018       BMP RESULTS:  Lab Results   Component Value Date     03/26/2024     02/10/2021    POTASSIUM 4.7 03/26/2024    POTASSIUM 4.1 02/22/2023    POTASSIUM 4.5 02/10/2021    CHLORIDE 102 03/26/2024    CHLORIDE 111 (H) 02/22/2023    CHLORIDE 105 02/10/2021    CO2 27 03/26/2024    CO2 28 02/22/2023    CO2 31 02/10/2021    ANIONGAP 10 03/26/2024    ANIONGAP 5 02/22/2023    ANIONGAP 3 02/10/2021      (H) 03/26/2024     (H) 10/25/2023     (H) 02/22/2023     (H) 02/10/2021    BUN 18.4 03/26/2024    BUN 25 02/22/2023    BUN 23 02/10/2021    CR 0.83 03/26/2024    CR 1.00 02/10/2021    GFRESTIMATED 71 03/26/2024    GFRESTIMATED 54 (L) 02/10/2021    GFRESTBLACK 63 02/10/2021    JOSE 10.1 03/26/2024    JOSE 9.1 02/10/2021        A1C RESULTS:  Lab Results   Component Value Date    A1C 5.2 01/31/2022    A1C 5.4 11/01/2018       INR RESULTS:  Lab Results   Component Value Date    INR 1.20 (H) 10/23/2023    INR 1.15 10/02/2023          PROCEDURES & FURTHER ASSESSMENTS:     EKG today 5/2/24:  SB with HR 58 bifascicular block     ECHO 11/27/23  ______________________________________________________________________________  Interpretation Summary  TAVR with 26mm Catalan Jae 3 Resilia valve 10/25/23.  The mean gradient across the aortic valve is 8 mmHg.  Trace paravalvular AI.  No significant changes noted.  ______________________________________________________________________________  Left Ventricle  TAVR with 26mm Catalan Jae 3 Resilia valve 10/25/23. Global and regional  left ventricular function is normal with an EF of 55-60%. Left ventricular  size is normal. Mild concentric wall thickening consistent with left  ventricular hypertrophy is present. Grade I or early diastolic dysfunction. No  regional wall motion abnormalities are seen.     Right Ventricle  Right ventricular function, chamber size, wall motion, and thickness are  normal.     Atria  Both atria appear normal. The atrial septum is intact as assessed by color  Doppler .     Mitral Valve  The mitral valve is normal. Trace mitral insufficiency is present.     Aortic Valve  The mean gradient across the aortic valve is 8 mmHg. Trace paravalvular AI.     Tricuspid Valve  The tricuspid valve is normal. Trace tricuspid insufficiency is present. The  right ventricular systolic pressure is approximated at 21.0 mmHg plus the  right  atrial pressure. Pulmonary artery systolic pressure is normal.     Pulmonic Valve  The pulmonic valve is normal. Trace pulmonic insufficiency is present.     Vessels  The thoracic aorta is normal. The pulmonary artery and bifurcation cannot be  assessed. The inferior vena cava is normal.     Pericardium  No pericardial effusion is present.     Compared to Previous Study  No significant changes noted.    EKG 11/27/23:  NSR baseline bifasicular block       EKG 10/30/23     AF with RVR         EKG 10/26/23 POD 1:       Echocardiogram 10/26/23 POD 1:  Interpretation Summary  TAVR with 26mm Catalan Jae 3 Resilia valve 10/25/23. The mean gradient  across the aortic valve is 9 mmHg. There is trace paravalvular regurgitation.  Global and regional left ventricular function is normal with an EF of 60-65%.  Global right ventricular function is normal.  IVC diameter <2.1 cm collapsing >50% with sniff suggests a normal RA pressure  of 3 mmHg.  No pericardial effusion is present.     TAVR 10/25/23:  Plan       Follow bedrest per protocol    Continued medical management and lifestyle modifications for cardiovascular risk factor optimizations.    Admit to inpatient        1. Aspirin 81 mg po daily lifelong.  2. Bedrest per protocol.  3. Admit to the primary inpatient team for further evaluation and management.  4. Echocardiogram tomorrow.   5. Lifelong antibiotic prophylaxis prior to all dental procedures.  6. Plan for ambulatory heart rhythm monitor due to baseline RBBB.        NYHA Class: I     CLINICAL IMPRESSION:     Jenna Kaur is a 78 year old female who presents for 6 month TAVR f/up.     1.Severe aortic stenosis s/p TAVR w/ 26 mm ESV 10/25/23:  2. Hx bifasicular block:  3. Chronic diastolic heart failure, resolved post TAVR:  4. Paroxysmal AF, new onset post TAVR:  S/p TAVR with 26 mm Edward Jae valve without complication. POD#1 echo with mean PG 9 mmHg with trace AI. EKG with baseline RBBB, no new conduction  abnormalities. Cardiac monitor placed given higher risk of CHB. No evidence of high degree AVB but notable for new onset paroxysmal AF (6% burden rates in low 100s in AF). She asymptomatic and spontaneously converted to NSR. She was treated with rate control and AC.  ECHO at one month post TAVR showed normal TAVR function with mean PG 8 mmHg with trace PVL. Today feeling well aside from dizziness, pre syncope, likely orthostasis but will place cardiac monitor given baseline bifascicular block on BB therapy for AF.   - Continue Eliquis 5 mg BID for stroke prophylaxis for [id number] of 4 (HTN, age, gender), defer ASA no strong indication   - SBE prophylaxis lifelong   - Continue rate control with Coreg, reduce to 6.25 mg BID due to low BP  - Given new onset dizziness in setting of bifascicular block, plan for repeat cardiac monitor today   - Repeat echo in 6 months     5. HLD:   6. HTN:   7. Minimal CAD:   BP low, likely due to weight loss and heart healthy diet.   Continue losartan 50 mg daily, reduce Coreg as above.   Continue statin, defer ASA while on eliquis       RTC 6 months with echo and labs prior     JUAN CARLOS Andrade, CNP  [facility] Cardiology Team      CC  Patient Care Team:  Kenna Garcia PA-C as PCP - General (Physician Assistant)  Kenna Garcia PA-C as Assigned PCP  BELLO Haas MD as MD (Cardiovascular Disease)  Cherie Barrera NP as Assigned Heart and Vascular Provider

## 2024-05-02 NOTE — PROGRESS NOTES
[facility] HEART [facility]  CARDIOVASCULAR DIVISION    VALVE CLINIC RETURN VISIT    PRIMARY CARDIOLOGIST: Dr. Orellana       PERTINENT CLINICAL HISTORY:     Jenna Kaur is a very pleasant 78 year old female who presents for 6 month TAVR follow-up. She has a history of HTN, HLD, bifascicular block and severe aortic valvular stenosis that was treated with a transfemoral transcatheter aortic valve replacement (TAVR) with a 26 mm Catalan Jae 3 Resilia on 10/25/23 by Lucia Leyva and Lamont.  The TAVR and post-procedural course were notable for no complications. Her post procedure ECG showed stable bifasicular block and post procedure echo showed normal TAVR function with mean PG of 9 mmhg with trace PVL. She was discharged on ASA monotherapy and cardiac monitor given higher risk of CHB with baseline block. She was seen one week post TAVR and noted to have new onset AF with RVR, she was started on Eliquis 5 mg BID and Coreg 6.25 mg BID, with plans to discuss DCCV is she remained in AF at her 1 month follow-up visit. She was seen at 1 month and had converted to NSR. She was continued on AC and rate control. TAVR function looked normal on 1 month echo.     She presents today with no specific cardiovascular concerns. She completed 1 month of cardiac rehab and has continued to exercise 5 days a week. She walks 30 minutes on the treadmill 5 days a week and partipates in exercise class 3 days a week for an additional 30 minutes. She has also been watching what she eats and has lost 20 lbs over the past 6 months. She has no chest pain, shortness of breath, orthopnea or PND. She occasionally feels dizzy at the end of her exercise regimen. Has to stand for a few minutes and dizziness resolves.  She has not had any palpitations, everardo syncope. Her BP has been low at home anywhere from 83//70s.      She continues to take losartan 50 mg daily and Coreg 12.5 mg BID      PAST MEDICAL HISTORY:     HTN  HLD  RBBB, LAD, 1st  degree AVB  CKD  Severe aortic stenosis s/p TAVR      PAST SURGICAL HISTORY:     Past Surgical History:   Procedure Laterality Date    ABDOMEN SURGERY      CHOLECYSTECTOMY      COLONOSCOPY      CV CORONARY ANGIOGRAM N/A 10/2/2023    Procedure: Coronary Angiogram;  Surgeon: Garett Real MD;  Location:  HEART CARDIAC CATH LAB    CV PCI N/A 10/2/2023    Procedure: Percutaneous Coronary Intervention;  Surgeon: Garett Real MD;  Location:  HEART CARDIAC CATH LAB    CV TRANSCATHETER AORTIC VALVE REPLACEMENT-FEMORAL APPROACH N/A 10/25/2023    Procedure: Transcatheter aortic valve replacement with any indicated procedure. (Catalan);  Surgeon: Colton Leyva MD;  Location:  OR    EYE SURGERY  Cataract surgery 11/2 & 11/16/2021    OR TRANSCATHETER AORTIC VALVE REPLACEMENT, FEMORAL PERCUTANEOUS APPROACH (STANDBY) N/A 10/25/2023    Procedure: OR TRANSCATHETER AORTIC VALVE REPLACEMENT, OPEN FEMORAL ARTERY APPROACH- Catalan;  Surgeon: William Abernathy MD;  Location:  OR        CURRENT MEDICATIONS:     Current Outpatient Medications   Medication Sig Dispense Refill    amoxicillin (AMOXIL) 500 MG capsule Take 4 capsules (2,000 mg) by mouth once as needed (SBE prophyalxis take 30-60 minutes prior to dental procedure or cleaning) 4 capsule 3    apixaban ANTICOAGULANT (ELIQUIS) 5 MG tablet Take 1 tablet (5 mg) by mouth 2 times daily 180 tablet 3    Calcium Carbonate-Vitamin D (CALCIUM 500 + D PO) Take 2 tablets by mouth daily Takes 2 tablets      carvedilol (COREG) 12.5 MG tablet Take 1 tablet (12.5 mg) by mouth 2 times daily (with meals) 180 tablet 1    levothyroxine (SYNTHROID/LEVOTHROID) 112 MCG tablet TAKE ONE TABLET BY MOUTH ONCE DAILY 90 tablet 2    losartan (COZAAR) 50 MG tablet Take 1 tablet (50 mg) by mouth daily 90 tablet 3    polyethylene glycol (MIRALAX/GLYCOLAX) powder Take 17 g by mouth daily as needed      simvastatin (ZOCOR) 10 MG tablet TAKE ONE TABLET BY MOUTH EVERY NIGHT AT  BEDTIME 90 tablet 0        ALLERGIES:     Allergies   Allergen Reactions    Erythromycin GI Disturbance     Nausea and sick feeling    Magnesium      States she got diarrhea when given this at the hospital and wants that in her chart        FAMILY HISTORY:     Family History   Problem Relation Age of Onset    Breast Cancer Mother 70    Colon Cancer Father 59    Arrhythmia Brother     Sleep Apnea Brother         SOCIAL HISTORY:     Social History     Socioeconomic History    Marital status:    Tobacco Use    Smoking status: Never    Smokeless tobacco: Never   Vaping Use    Vaping Use: Never used   Substance and Sexual Activity    Alcohol use: Never    Drug use: Never    Sexual activity: Never   Other Topics Concern    Parent/sibling w/ CABG, MI or angioplasty before 65F 55M? No        REVIEW OF SYSTEMS:     Constitutional: No fevers or chills  Skin: No new rash or itching  Eyes: No acute change in vision  Ears/Nose/Throat: No purulent rhinorrhea, new hearing loss, or new vertigo  Respiratory: No cough or hemoptysis  Cardiovascular: See HPI  Gastrointestinal: No change in appetite, vomiting, hematemesis or diarrhea  Genitourinary: No dysuria or hematuria  Musculoskeletal: No new back pain, neck pain or muscle pain  Neurologic: No new headaches, focal weakness or behavior changes  Psychiatric: No hallucinations, excessive alcohol consumption or illegal drug usage  Hematologic/Lymphatic/Immunologic: No bleeding, chills, fever, night sweats or weight loss  Endocrine: No new cold intolerance, heat intolerance, polyphagia, polydipsia or polyuria      PHYSICAL EXAMINATION:     /73 (BP Location: Left arm, Patient Position: Chair, Cuff Size: Adult Regular)   Wt 75.8 kg (167 lb 1.6 oz)   BMI 28.14 kg/m      GENERAL: No acute distress.  HEENT: EOMI. Sclerae white, not injected. Nares clear. Pharynx without erythema or exudate.   Neck: No adenopathy. No thyromegaly. No jugular venous distension.   Heart:  Irregularly irregular No murmur.   Lungs: Clear to auscultation. No ronchi, wheezes, rales.   Abdomen: Soft, nontender, nondistended. Bowel sounds present.  Extremities: No clubbing, cyanosis, or edema.   Neurologic: Alert and oriented to person/place/time, normal speech and affect. No focal deficits.  Skin: No petechiae, purpura or rash.     LABORATORY DATA:     LIPID RESULTS:  Lab Results   Component Value Date    CHOL 163 03/26/2024    CHOL 162 12/11/2020    HDL 51 03/26/2024    HDL 65 12/11/2020    LDL 84 03/26/2024    LDL 76 12/11/2020    TRIG 140 03/26/2024    TRIG 106 12/11/2020       LIVER ENZYME RESULTS:  Lab Results   Component Value Date    AST 19 03/26/2024    ALT 14 03/26/2024       CBC RESULTS:  Lab Results   Component Value Date    WBC 9.3 11/27/2023    WBC 7.1 11/01/2018    RBC 4.80 11/27/2023    RBC 4.61 11/01/2018    HGB 15.3 11/27/2023    HGB 14.3 11/01/2018    HCT 43.5 11/27/2023    HCT 42.5 11/01/2018    MCV 91 11/27/2023    MCV 92 11/01/2018    MCH 31.9 11/27/2023    MCH 31.0 11/01/2018    MCHC 35.2 11/27/2023    MCHC 33.6 11/01/2018    RDW 11.7 11/27/2023    RDW 12.2 11/01/2018     11/27/2023     11/01/2018       BMP RESULTS:  Lab Results   Component Value Date     03/26/2024     02/10/2021    POTASSIUM 4.7 03/26/2024    POTASSIUM 4.1 02/22/2023    POTASSIUM 4.5 02/10/2021    CHLORIDE 102 03/26/2024    CHLORIDE 111 (H) 02/22/2023    CHLORIDE 105 02/10/2021    CO2 27 03/26/2024    CO2 28 02/22/2023    CO2 31 02/10/2021    ANIONGAP 10 03/26/2024    ANIONGAP 5 02/22/2023    ANIONGAP 3 02/10/2021     (H) 03/26/2024     (H) 10/25/2023     (H) 02/22/2023     (H) 02/10/2021    BUN 18.4 03/26/2024    BUN 25 02/22/2023    BUN 23 02/10/2021    CR 0.83 03/26/2024    CR 1.00 02/10/2021    GFRESTIMATED 71 03/26/2024    GFRESTIMATED 54 (L) 02/10/2021    GFRESTBLACK 63 02/10/2021    JOSE 10.1 03/26/2024    JOSE 9.1 02/10/2021        A1C RESULTS:  Lab  Results   Component Value Date    A1C 5.2 01/31/2022    A1C 5.4 11/01/2018       INR RESULTS:  Lab Results   Component Value Date    INR 1.20 (H) 10/23/2023    INR 1.15 10/02/2023          PROCEDURES & FURTHER ASSESSMENTS:     EKG today 5/2/24:  SB with HR 58 bifascicular block     ECHO 11/27/23  ______________________________________________________________________________  Interpretation Summary  TAVR with 26mm Catalan Jae 3 Resilia valve 10/25/23.  The mean gradient across the aortic valve is 8 mmHg.  Trace paravalvular AI.  No significant changes noted.  ______________________________________________________________________________  Left Ventricle  TAVR with 26mm Catalan Jae 3 Resilia valve 10/25/23. Global and regional  left ventricular function is normal with an EF of 55-60%. Left ventricular  size is normal. Mild concentric wall thickening consistent with left  ventricular hypertrophy is present. Grade I or early diastolic dysfunction. No  regional wall motion abnormalities are seen.     Right Ventricle  Right ventricular function, chamber size, wall motion, and thickness are  normal.     Atria  Both atria appear normal. The atrial septum is intact as assessed by color  Doppler .     Mitral Valve  The mitral valve is normal. Trace mitral insufficiency is present.     Aortic Valve  The mean gradient across the aortic valve is 8 mmHg. Trace paravalvular AI.     Tricuspid Valve  The tricuspid valve is normal. Trace tricuspid insufficiency is present. The  right ventricular systolic pressure is approximated at 21.0 mmHg plus the  right atrial pressure. Pulmonary artery systolic pressure is normal.     Pulmonic Valve  The pulmonic valve is normal. Trace pulmonic insufficiency is present.     Vessels  The thoracic aorta is normal. The pulmonary artery and bifurcation cannot be  assessed. The inferior vena cava is normal.     Pericardium  No pericardial effusion is present.     Compared to Previous Study  No  significant changes noted.    EKG 11/27/23:  NSR baseline bifasicular block       EKG 10/30/23     AF with RVR         EKG 10/26/23 POD 1:       Echocardiogram 10/26/23 POD 1:  Interpretation Summary  TAVR with 26mm Catalan Jae 3 Resilia valve 10/25/23. The mean gradient  across the aortic valve is 9 mmHg. There is trace paravalvular regurgitation.  Global and regional left ventricular function is normal with an EF of 60-65%.  Global right ventricular function is normal.  IVC diameter <2.1 cm collapsing >50% with sniff suggests a normal RA pressure  of 3 mmHg.  No pericardial effusion is present.     TAVR 10/25/23:  Plan      Follow bedrest per protocol   Continued medical management and lifestyle modifications for cardiovascular risk factor optimizations.   Admit to inpatient        1. Aspirin 81 mg po daily lifelong.  2. Bedrest per protocol.  3. Admit to the primary inpatient team for further evaluation and management.  4. Echocardiogram tomorrow.   5. Lifelong antibiotic prophylaxis prior to all dental procedures.  6. Plan for ambulatory heart rhythm monitor due to baseline RBBB.        NYHA Class: I     CLINICAL IMPRESSION:     Jenna Kaur is a 78 year old female who presents for 6 month TAVR f/up.     1.Severe aortic stenosis s/p TAVR w/ 26 mm ESV 10/25/23:  2. Hx bifasicular block:  3. Chronic diastolic heart failure, resolved post TAVR:  4. Paroxysmal AF, new onset post TAVR:  S/p TAVR with 26 mm Edward Jae valve without complication. POD#1 echo with mean PG 9 mmHg with trace AI. EKG with baseline RBBB, no new conduction abnormalities. Cardiac monitor placed given higher risk of CHB. No evidence of high degree AVB but notable for new onset paroxysmal AF (6% burden rates in low 100s in AF). She asymptomatic and spontaneously converted to NSR. She was treated with rate control and AC.  ECHO at one month post TAVR showed normal TAVR function with mean PG 8 mmHg with trace PVL. Today feeling well aside  from dizziness, pre syncope, likely orthostasis but will place cardiac monitor given baseline bifascicular block on BB therapy for AF.   - Continue Eliquis 5 mg BID for stroke prophylaxis for [id number] of 4 (HTN, age, gender), defer ASA no strong indication   - SBE prophylaxis lifelong   - Continue rate control with Coreg, reduce to 6.25 mg BID due to low BP  - Given new onset dizziness in setting of bifascicular block, plan for repeat cardiac monitor today   - Repeat echo in 6 months     5. HLD:   6. HTN:   7. Minimal CAD:   BP low, likely due to weight loss and heart healthy diet.   Continue losartan 50 mg daily, reduce Coreg as above.   Continue statin, defer ASA while on eliquis       RTC 6 months with echo and labs prior     JUAN CARLOS Andrade, CNP  [facility] Cardiology Team      CC  Patient Care Team:  Kenna Garcia PA-C as PCP - General (Physician Assistant)  Kenna Garcia PA-C as Assigned PCP  BELLO Haas MD as MD (Cardiovascular Disease)  Cherie Barrera NP as Assigned Heart and Vascular Provider

## 2024-05-02 NOTE — PATIENT INSTRUCTIONS
You were seen today in the Structural Heart Clinic at the [facility].    Cardiology provider you saw during your visit: Cherie Barrera NP    Your dizziness is likely due to low BP from weight loss and heart healthy diet. I would like to make sure you don't have heart block, therefore will place 1 week cardiac monitor. Will also decrease coreg to 6.25 mg twice daily which will help increase BP.     Instructions:   Reduce COREG to 6.25 mg twice daily (don't take any tonight, start tomorrow)  Wear cardiac monitor for 1 week then send back in - will update you with results   Continue Eliquis 5 mg twice daily for stroke prophylaxis  Continue losartan 50 mg daily   Continue daily exercise   For all future dental cleanings and procedures you will need to take antibiotics prior - see instructions below.   Follow-up in Valve Clinic in 6 months with echo and labs prior     Prevention of Infective (Bacterial) Endocarditis:  You are at increased risk for developing adverse outcomes from infective endocarditis (IE), also known as bacterial endocarditis (BE) because of the new device in your heart. The guidelines for prevention of IE are to give patients antibiotics prior to any dental procedures that involve manipulation of gingival tissue or the periapical region of teeth, or perforation of the oral mucosa:      It is recommended to take Amoxicillin 2 gm by mouth as a single dose 30 to 60 minutes before procedure.     OR if allergic to Penicillin or Ampicillin:     Cephalexin 2 gm by mouth, or  Clindamycin 600 mg by mouth, or  Azithromycin or Clarithromycin 500 mg PO       Questions and scheduling:   First call: Structural Heart  Sonia Rios [phone]    General scheduling line: [phone].   First press #1 for the [facility] and then press #3 for Medical Questions to reach the Cardiology triage nurse.     On Call Cardiologist for after hours or on weekends: [phone], press option #4 and ask to  speak to the on-call Cardiologist.

## 2024-05-02 NOTE — NURSING NOTE
Chief Complaint   Patient presents with    Follow Up     TAVR 6 month Follow up       Vitals were taken and medications reconciled.    Abdirizak Crowley, EMT  10:30 AM

## 2024-05-02 NOTE — NURSING NOTE
Jenna Kaur arrived here on 5/2/2024 11:32 AM for 3-7 Days  Zio monitor placement per ordering provider Cherie Barrera NP,  for the diagnosis TAVR.  Patient s skin was prepped per protocol.  Zio monitor was placed.  Instructions were reviewed with and given to the patient.  Patient verbalized understanding of wear, troubleshooting and monitor return instructions.     EKG was performed on patient.

## 2024-05-03 LAB
ATRIAL RATE - MUSE: 57 BPM
DIASTOLIC BLOOD PRESSURE - MUSE: NORMAL MMHG
INTERPRETATION ECG - MUSE: NORMAL
P AXIS - MUSE: 14 DEGREES
PR INTERVAL - MUSE: 186 MS
QRS DURATION - MUSE: 130 MS
QT - MUSE: 496 MS
QTC - MUSE: 482 MS
R AXIS - MUSE: -56 DEGREES
SYSTOLIC BLOOD PRESSURE - MUSE: NORMAL MMHG
T AXIS - MUSE: 13 DEGREES
VENTRICULAR RATE- MUSE: 57 BPM

## 2024-05-16 PROCEDURE — 93248 EXT ECG>7D<15D REV&INTERPJ: CPT | Performed by: INTERNAL MEDICINE

## 2024-06-15 DIAGNOSIS — E03.4 HYPOTHYROIDISM DUE TO ACQUIRED ATROPHY OF THYROID: ICD-10-CM

## 2024-06-17 RX ORDER — LEVOTHYROXINE SODIUM 112 UG/1
112 TABLET ORAL DAILY
Qty: 90 TABLET | Refills: 0 | OUTPATIENT
Start: 2024-06-17

## 2024-07-16 ENCOUNTER — PATIENT OUTREACH (OUTPATIENT)
Dept: CARE COORDINATION | Facility: CLINIC | Age: 79
End: 2024-07-16
Payer: COMMERCIAL

## 2024-07-22 DIAGNOSIS — E78.5 HYPERLIPIDEMIA LDL GOAL <130: ICD-10-CM

## 2024-07-22 RX ORDER — SIMVASTATIN 10 MG
10 TABLET ORAL AT BEDTIME
Qty: 90 TABLET | Refills: 0 | Status: SHIPPED | OUTPATIENT
Start: 2024-07-22

## 2024-08-16 ENCOUNTER — ANCILLARY PROCEDURE (OUTPATIENT)
Dept: MAMMOGRAPHY | Facility: CLINIC | Age: 79
End: 2024-08-16
Payer: COMMERCIAL

## 2024-08-16 DIAGNOSIS — Z12.31 VISIT FOR SCREENING MAMMOGRAM: ICD-10-CM

## 2024-08-16 PROCEDURE — 77063 BREAST TOMOSYNTHESIS BI: CPT | Mod: TC | Performed by: RADIOLOGY

## 2024-08-16 PROCEDURE — 77067 SCR MAMMO BI INCL CAD: CPT | Mod: TC | Performed by: RADIOLOGY

## 2024-10-09 DIAGNOSIS — I45.10 RBBB: ICD-10-CM

## 2024-10-09 DIAGNOSIS — I48.19 PERSISTENT ATRIAL FIBRILLATION (H): ICD-10-CM

## 2024-10-09 DIAGNOSIS — I10 ESSENTIAL HYPERTENSION WITH GOAL BLOOD PRESSURE LESS THAN 140/90: ICD-10-CM

## 2024-10-09 DIAGNOSIS — Z95.2 S/P TAVR (TRANSCATHETER AORTIC VALVE REPLACEMENT): ICD-10-CM

## 2024-10-14 RX ORDER — LOSARTAN POTASSIUM 50 MG/1
50 TABLET ORAL DAILY
Qty: 90 TABLET | Refills: 1 | Status: SHIPPED | OUTPATIENT
Start: 2024-10-14

## 2024-10-14 NOTE — TELEPHONE ENCOUNTER
apixaban ANTICOAGULANT (ELIQUIS) 5 MG tablet 180 tablet 3 10/30/2023       Anticoagulant Agents [id number]/09/2024 05:03 AM   Protocol Details Patient is 18-79 years of age        losartan (COZAAR) 50 MG tablet 90 tablet 3 10/30/2023       Angiotensin-II Receptors Passed        Last Office Visit: 5/2/24  Future Office visit:   11/8/24    Routing refill request to provider for review/approval because:  Patient age > 79 for apixaban    Lian Latham RN  [facility]/Red Flag Triage & Med Refill Team

## 2024-10-22 DIAGNOSIS — E78.5 HYPERLIPIDEMIA LDL GOAL <130: ICD-10-CM

## 2024-10-22 RX ORDER — SIMVASTATIN 10 MG
10 TABLET ORAL AT BEDTIME
Qty: 90 TABLET | Refills: 0 | Status: SHIPPED | OUTPATIENT
Start: 2024-10-22

## 2024-10-31 ENCOUNTER — TELEPHONE (OUTPATIENT)
Dept: CARDIOLOGY | Facility: CLINIC | Age: 79
End: 2024-10-31
Payer: COMMERCIAL

## 2024-10-31 NOTE — TELEPHONE ENCOUNTER
Patient contacted and appointment made with Cherie Barrera on 11/29/24 with echo and labs prior.

## 2024-10-31 NOTE — TELEPHONE ENCOUNTER
Called and ALEX/Lucian message to reschedule her appointments on 11/8 labs, echo , Cherie Barrera return     Radha Calvillo  Clinic    Pulmonary Hypertension   [facility]  (P) [phone]

## 2024-11-01 ENCOUNTER — TELEPHONE (OUTPATIENT)
Dept: CARDIOLOGY | Facility: CLINIC | Age: 79
End: 2024-11-01
Payer: COMMERCIAL

## 2024-11-01 NOTE — TELEPHONE ENCOUNTER
[facility] Call Center    Phone Message    May a detailed message be left on voicemail: yes     Reason for Call: Other: Please call patient. She received a call from Dragan rescheduling her appointments to 11/29. It somehow got rescheduled to 12/6 by  via [employer].Patient does not want to move her 11/29 appointment to 12/6 as she has already arranged transportation.     Action Taken: Other: cardio    Travel Screening: Not Applicable     Date of Service:

## 2024-11-29 ENCOUNTER — ANCILLARY PROCEDURE (OUTPATIENT)
Dept: CARDIOLOGY | Facility: CLINIC | Age: 79
End: 2024-11-29
Attending: NURSE PRACTITIONER
Payer: COMMERCIAL

## 2024-11-29 ENCOUNTER — LAB (OUTPATIENT)
Dept: LAB | Facility: CLINIC | Age: 79
End: 2024-11-29
Attending: NURSE PRACTITIONER
Payer: COMMERCIAL

## 2024-11-29 DIAGNOSIS — I45.10 RBBB: ICD-10-CM

## 2024-11-29 DIAGNOSIS — I10 ESSENTIAL HYPERTENSION WITH GOAL BLOOD PRESSURE LESS THAN 140/90: ICD-10-CM

## 2024-11-29 DIAGNOSIS — Z95.2 S/P TAVR (TRANSCATHETER AORTIC VALVE REPLACEMENT): ICD-10-CM

## 2024-11-29 DIAGNOSIS — I48.0 PAROXYSMAL ATRIAL FIBRILLATION (H): ICD-10-CM

## 2024-11-29 LAB
ALBUMIN SERPL BCG-MCNC: 4.4 G/DL (ref 3.5–5.2)
ALP SERPL-CCNC: 95 U/L (ref 40–150)
ALT SERPL W P-5'-P-CCNC: 12 U/L (ref 0–50)
ANION GAP SERPL CALCULATED.3IONS-SCNC: 9 MMOL/L (ref 7–15)
AST SERPL W P-5'-P-CCNC: 16 U/L (ref 0–45)
BILIRUB SERPL-MCNC: 0.3 MG/DL
BUN SERPL-MCNC: 19.7 MG/DL (ref 8–23)
CALCIUM SERPL-MCNC: 9.9 MG/DL (ref 8.8–10.4)
CHLORIDE SERPL-SCNC: 104 MMOL/L (ref 98–107)
CHOLEST SERPL-MCNC: 155 MG/DL
CREAT SERPL-MCNC: 0.74 MG/DL (ref 0.51–0.95)
EGFRCR SERPLBLD CKD-EPI 2021: 81 ML/MIN/1.73M2
ERYTHROCYTE [DISTWIDTH] IN BLOOD BY AUTOMATED COUNT: 11.9 % (ref 10–15)
FASTING STATUS PATIENT QL REPORTED: NO
FASTING STATUS PATIENT QL REPORTED: NO
GLUCOSE SERPL-MCNC: 84 MG/DL (ref 70–99)
HCO3 SERPL-SCNC: 28 MMOL/L (ref 22–29)
HCT VFR BLD AUTO: 43 % (ref 35–47)
HDLC SERPL-MCNC: 65 MG/DL
HGB BLD-MCNC: 14.7 G/DL (ref 11.7–15.7)
LDLC SERPL CALC-MCNC: 70 MG/DL
LVEF ECHO: NORMAL
MCH RBC QN AUTO: 30.8 PG (ref 26.5–33)
MCHC RBC AUTO-ENTMCNC: 34.2 G/DL (ref 31.5–36.5)
MCV RBC AUTO: 90 FL (ref 78–100)
NONHDLC SERPL-MCNC: 90 MG/DL
PLATELET # BLD AUTO: 159 10E3/UL (ref 150–450)
POTASSIUM SERPL-SCNC: 4 MMOL/L (ref 3.4–5.3)
PROT SERPL-MCNC: 7.1 G/DL (ref 6.4–8.3)
RBC # BLD AUTO: 4.78 10E6/UL (ref 3.8–5.2)
SODIUM SERPL-SCNC: 141 MMOL/L (ref 135–145)
TRIGL SERPL-MCNC: 101 MG/DL
WBC # BLD AUTO: 6.6 10E3/UL (ref 4–11)

## 2024-11-29 PROCEDURE — 80053 COMPREHEN METABOLIC PANEL: CPT | Performed by: PATHOLOGY

## 2024-11-29 PROCEDURE — 85027 COMPLETE CBC AUTOMATED: CPT | Performed by: PATHOLOGY

## 2024-11-29 PROCEDURE — 93306 TTE W/DOPPLER COMPLETE: CPT | Performed by: INTERNAL MEDICINE

## 2024-11-29 PROCEDURE — 80061 LIPID PANEL: CPT | Performed by: PATHOLOGY

## 2024-11-29 PROCEDURE — 36415 COLL VENOUS BLD VENIPUNCTURE: CPT | Performed by: PATHOLOGY

## 2024-12-30 ENCOUNTER — TELEPHONE (OUTPATIENT)
Dept: CARDIOLOGY | Facility: CLINIC | Age: 79
End: 2024-12-30
Payer: COMMERCIAL

## 2024-12-30 NOTE — TELEPHONE ENCOUNTER
Telephone call to touch [facility] with patient after she had felt dizzy 11/29/24. Cherie Barrera advised decreasing Losartan to 25 mg daily and following up.     Today, patient states her dizziness alot better and sometimes will go a week without noticing. Blood pressure varies 104//87. Patient states she never misses a dose of her Losartan and takes it at night time. Patient states she feels better and we will continue to monitor at this time.     We will touch base in a month. Patient states her insurance is changing from [employer] to [employer] in 2025. Patient's next follow up is due Nov 2025 for 2 year TAVR follow up.     Zabrina Forman RN on 12/30/2024 at 12:18 PM

## 2025-01-08 ENCOUNTER — TELEPHONE (OUTPATIENT)
Dept: CARDIOLOGY | Facility: CLINIC | Age: 80
End: 2025-01-08
Payer: COMMERCIAL

## 2025-01-08 NOTE — TELEPHONE ENCOUNTER
Telephone call to patient to touch base again about her dizziness. Patient states she is feeling much better and no issues on decreased Losarten dose. Patient states she has a wellness check in March.    Patient will reach out to our team via telephone or MyChart, if any issues.

## 2025-03-22 ENCOUNTER — HEALTH MAINTENANCE LETTER (OUTPATIENT)
Age: 80
End: 2025-03-22

## 2025-03-27 SDOH — HEALTH STABILITY: PHYSICAL HEALTH: ON AVERAGE, HOW MANY DAYS PER WEEK DO YOU ENGAGE IN MODERATE TO STRENUOUS EXERCISE (LIKE A BRISK WALK)?: 5 DAYS

## 2025-03-27 SDOH — HEALTH STABILITY: PHYSICAL HEALTH: ON AVERAGE, HOW MANY MINUTES DO YOU ENGAGE IN EXERCISE AT THIS LEVEL?: 30 MIN

## 2025-03-27 ASSESSMENT — SOCIAL DETERMINANTS OF HEALTH (SDOH): HOW OFTEN DO YOU GET TOGETHER WITH FRIENDS OR RELATIVES?: MORE THAN THREE TIMES A WEEK

## 2025-03-31 ENCOUNTER — OFFICE VISIT (OUTPATIENT)
Dept: FAMILY MEDICINE | Facility: CLINIC | Age: 80
End: 2025-03-31
Payer: COMMERCIAL

## 2025-03-31 VITALS
DIASTOLIC BLOOD PRESSURE: 90 MMHG | SYSTOLIC BLOOD PRESSURE: 167 MMHG | WEIGHT: 160.2 LBS | HEART RATE: 64 BPM | BODY MASS INDEX: 27.35 KG/M2 | OXYGEN SATURATION: 98 % | HEIGHT: 64 IN | TEMPERATURE: 97.6 F | RESPIRATION RATE: 20 BRPM

## 2025-03-31 DIAGNOSIS — E78.5 HYPERLIPIDEMIA LDL GOAL <130: ICD-10-CM

## 2025-03-31 DIAGNOSIS — Z00.00 ENCOUNTER FOR MEDICARE ANNUAL WELLNESS EXAM: Primary | ICD-10-CM

## 2025-03-31 DIAGNOSIS — E03.4 HYPOTHYROIDISM DUE TO ACQUIRED ATROPHY OF THYROID: ICD-10-CM

## 2025-03-31 DIAGNOSIS — I10 ESSENTIAL HYPERTENSION WITH GOAL BLOOD PRESSURE LESS THAN 140/90: ICD-10-CM

## 2025-03-31 LAB
ANION GAP SERPL CALCULATED.3IONS-SCNC: 10 MMOL/L (ref 7–15)
BUN SERPL-MCNC: 17.7 MG/DL (ref 8–23)
CALCIUM SERPL-MCNC: 10 MG/DL (ref 8.8–10.4)
CHLORIDE SERPL-SCNC: 105 MMOL/L (ref 98–107)
CHOLEST SERPL-MCNC: 165 MG/DL
CREAT SERPL-MCNC: 0.79 MG/DL (ref 0.51–0.95)
CREAT UR-MCNC: 32.4 MG/DL
EGFRCR SERPLBLD CKD-EPI 2021: 75 ML/MIN/1.73M2
FASTING STATUS PATIENT QL REPORTED: YES
FASTING STATUS PATIENT QL REPORTED: YES
GLUCOSE SERPL-MCNC: 101 MG/DL (ref 70–99)
HCO3 SERPL-SCNC: 28 MMOL/L (ref 22–29)
HDLC SERPL-MCNC: 64 MG/DL
LDLC SERPL CALC-MCNC: 75 MG/DL
MICROALBUMIN UR-MCNC: <12 MG/L
MICROALBUMIN/CREAT UR: NORMAL MG/G{CREAT}
NONHDLC SERPL-MCNC: 101 MG/DL
POTASSIUM SERPL-SCNC: 4.8 MMOL/L (ref 3.4–5.3)
SODIUM SERPL-SCNC: 143 MMOL/L (ref 135–145)
T4 FREE SERPL-MCNC: 1.93 NG/DL (ref 0.9–1.7)
TRIGL SERPL-MCNC: 130 MG/DL
TSH SERPL DL<=0.005 MIU/L-ACNC: 0.18 UIU/ML (ref 0.3–4.2)

## 2025-03-31 PROCEDURE — 80061 LIPID PANEL: CPT | Performed by: PHYSICIAN ASSISTANT

## 2025-03-31 PROCEDURE — 80048 BASIC METABOLIC PNL TOTAL CA: CPT | Performed by: PHYSICIAN ASSISTANT

## 2025-03-31 PROCEDURE — 3080F DIAST BP >= 90 MM HG: CPT | Performed by: PHYSICIAN ASSISTANT

## 2025-03-31 PROCEDURE — 82570 ASSAY OF URINE CREATININE: CPT | Performed by: PHYSICIAN ASSISTANT

## 2025-03-31 PROCEDURE — G0439 PPPS, SUBSEQ VISIT: HCPCS | Performed by: PHYSICIAN ASSISTANT

## 2025-03-31 PROCEDURE — 84443 ASSAY THYROID STIM HORMONE: CPT | Performed by: PHYSICIAN ASSISTANT

## 2025-03-31 PROCEDURE — 82043 UR ALBUMIN QUANTITATIVE: CPT | Performed by: PHYSICIAN ASSISTANT

## 2025-03-31 PROCEDURE — 36415 COLL VENOUS BLD VENIPUNCTURE: CPT | Performed by: PHYSICIAN ASSISTANT

## 2025-03-31 PROCEDURE — 84439 ASSAY OF FREE THYROXINE: CPT | Performed by: PHYSICIAN ASSISTANT

## 2025-03-31 PROCEDURE — 3074F SYST BP LT 130 MM HG: CPT | Performed by: PHYSICIAN ASSISTANT

## 2025-03-31 PROCEDURE — 99214 OFFICE O/P EST MOD 30 MIN: CPT | Mod: 25 | Performed by: PHYSICIAN ASSISTANT

## 2025-03-31 RX ORDER — LOSARTAN POTASSIUM 25 MG/1
25 TABLET ORAL DAILY
Qty: 90 TABLET | Refills: 3 | Status: SHIPPED | OUTPATIENT
Start: 2025-03-31

## 2025-03-31 RX ORDER — SIMVASTATIN 10 MG
10 TABLET ORAL AT BEDTIME
Qty: 90 TABLET | Refills: 3 | Status: SHIPPED | OUTPATIENT
Start: 2025-03-31

## 2025-03-31 RX ORDER — LEVOTHYROXINE SODIUM 112 UG/1
112 TABLET ORAL DAILY
Qty: 90 TABLET | Refills: 3 | Status: SHIPPED | OUTPATIENT
Start: 2025-03-31

## 2025-03-31 NOTE — PROGRESS NOTES
"Preventive Care Visit  [facility] LUCAS Garcia PA-C, Family Medicine  Mar 31, 2025      Assessment & Plan       ICD-10-CM    1. Encounter for Medicare annual wellness exam  Z00.00       2. Hypothyroidism due to acquired atrophy of thyroid  E03.4 TSH WITH FREE T4 REFLEX     levothyroxine (SYNTHROID/LEVOTHROID) 112 MCG tablet      3. Hyperlipidemia LDL goal <130  E78.5 simvastatin (ZOCOR) 10 MG tablet     Lipid panel reflex to direct LDL Fasting      4. Essential hypertension with goal blood pressure less than 140/90  I10 Albumin Random Urine Quantitative with Creat Ratio     Basic metabolic panel  (Ca, Cl, CO2, Creat, Gluc, K, Na, BUN)     losartan (COZAAR) 25 MG tablet          2-4) Blood pressure elevated. She forgot to bring in her logs from home today. She will continue to monitor blood pressure at home and we'll follow up again in 1-2 months to review home readings. Routine labs in process.       BMI  Estimated body mass index is 27.79 kg/m  as calculated from the following:    Height as of this encounter: 1.617 m (5' 3.66\").    Weight as of this encounter: 72.7 kg (160 lb 3.2 oz).     Counseling  Appropriate preventive services were addressed with this patient via screening, questionnaire, or discussion as appropriate for fall prevention, nutrition, physical activity, Tobacco-use cessation, social engagement, weight loss and cognition.  Checklist reviewing preventive services available has been given to the patient.  Reviewed patient's diet, addressing concerns and/or questions.   Patient reported safety concerns were addressed today.    Return in about 2 months (around 5/31/2025) for BP follow up, with Kenna, in person.      Arely West is a 79 year old, presenting for the following:  Medicare Visit and Dizziness        3/31/2025     9:07 AM   Additional Questions   Roomed by Ct   Accompanied by german         3/31/2025     9:07 AM   Patient Reported Additional Medications "   Patient reports taking the following new medications none          History of Present Illness       Hyperlipidemia:  She presents for follow up of hyperlipidemia.   She is taking medication to lower cholesterol. She is not having myalgia or other side effects to statin medications.    Hypertension: She presents for follow up of hypertension.  She does check blood pressure  regularly outside of the clinic. Outside blood pressures have been over 140/90. She does not follow a low salt diet.     Hypothyroidism:     Since last visit, patient describes the following symptoms::  None    Weight gain::  No weight gain    Weight loss::  No weight loss        Advance Care Planning  Patient has a Health Care Directive on file  Advance care planning document is on file and is current.      3/27/2025   General Health   How would you rate your overall physical health? Excellent   Feel stress (tense, anxious, or unable to sleep) Not at all         3/27/2025   Nutrition   Diet: Regular (no restrictions)         3/27/2025   Exercise   Days per week of moderate/strenous exercise 5 days   Average minutes spent exercising at this level 30 min         3/27/2025   Social Factors   Frequency of gathering with friends or relatives More than three times a week   Worry food won't last until get money to buy more No   Food not last or not have enough money for food? No   Do you have housing? (Housing is defined as stable permanent housing and does not include staying ouside in a car, in a tent, in an abandoned building, in an overnight shelter, or couch-surfing.) Yes   Are you worried about losing your housing? No   Lack of transportation? No   Unable to get utilities (heat,electricity)? No         3/27/2025   Fall Risk   Fallen 2 or more times in the past year? No   Trouble with walking or balance? No          3/27/2025   Activities of Daily Living- Home Safety   Needs help with the following daily activites None of the above   Safety  concerns in the home No grab bars in the bathroom         3/27/2025   Dental   Dentist two times every year? Yes         3/27/2025   Hearing Screening   Hearing concerns? None of the above         3/27/2025   Driving Risk Screening   Patient/family members have concerns about driving No         3/27/2025   General Alertness/Fatigue Screening   Have you been more tired than usual lately? No         3/27/2025   Urinary Incontinence Screening   Bothered by leaking urine in past 6 months No           3/19/2024   TB Screening   Were you born outside of the [place]? No           Today's PHQ-2 Score:       3/31/2025     9:00 AM   PHQ-2 ( 1999 [employer])   Q1: Little interest or pleasure in doing things 0   Q2: Feeling down, depressed or hopeless 0   PHQ-2 Score 0    Q1: Little interest or pleasure in doing things Not at all   Q2: Feeling down, depressed or hopeless Not at all   PHQ-2 Score 0       Patient-reported           3/27/2025   Substance Use   Alcohol more than 3/day or more than 7/wk No   Do you have a current opioid prescription? No   How severe/bad is pain from 1 to 10? 0/10 (No Pain)   Do you use any other substances recreationally? No     Social History     Tobacco Use    Smoking status: Never     Passive exposure: Past    Smokeless tobacco: Never   Vaping Use    Vaping status: Never Used   Substance Use Topics    Alcohol use: Never    Drug use: Never           8/16/2024   LAST FHS-7 RESULTS   1st degree relative breast or ovarian cancer Yes   Any relative bilateral breast cancer No   Any male have breast cancer No   Any ONE woman have BOTH breast AND ovarian cancer No   Any woman with breast cancer before 50yrs No   2 or more relatives with breast AND/OR ovarian cancer No   2 or more relatives with breast AND/OR bowel cancer Yes        Mammogram Screening - After age 74- determine frequency with patient based on health status, life expectancy and patient goals          Reviewed and updated as needed this visit by  "Provider                    Current providers sharing in care for this patient include:  Patient Care Team:  Kenna Garcia PA-C as PCP - General (Physician Assistant)  Kenna Garcia PA-C as Assigned PCP  BELLO Haas MD as MD (Cardiovascular Disease)  Cherie Barrera NP as Assigned Heart and Vascular Provider  Zabrina Forman RN as Registered Nurse  Michela, William Salazar MD as Assigned Heart and Vascular Surgical Provider    The following health maintenance items are reviewed in Epic and correct as of today:  Health Maintenance   Topic Date Due    HF ACTION PLAN  Never done    RSV VACCINE (1 - 1-dose 75+ series) Never done    COVID-19 Vaccine (8 - 2024-25 season) 03/23/2025    MICROALBUMIN  03/26/2025    MEDICARE ANNUAL WELLNESS VISIT  03/26/2025    TSH W/FREE T4 REFLEX  03/26/2025    BMP  05/29/2025    MAMMO SCREENING  08/16/2025    ALT  11/29/2025    LIPID  11/29/2025    CBC  11/29/2025    ANNUAL REVIEW OF HM ORDERS  03/31/2026    FALL RISK ASSESSMENT  03/31/2026    ADVANCE CARE PLANNING  03/31/2030    DTAP/TDAP/TD IMMUNIZATION (3 - Td or Tdap) 01/31/2032    PHQ-2 (once per calendar year)  Completed    INFLUENZA VACCINE  Completed    Pneumococcal Vaccine: 50+ Years  Completed    ZOSTER IMMUNIZATION  Completed    HPV IMMUNIZATION  Aged Out    MENINGITIS IMMUNIZATION  Aged Out    DEXA  Discontinued    COLORECTAL CANCER SCREENING  Discontinued         Review of Systems  Constitutional, neuro, ENT, endocrine, pulmonary, cardiac, gastrointestinal, genitourinary, musculoskeletal, integument and psychiatric systems are negative, except as otherwise noted.     Objective    Exam  BP (!) 167/90   Pulse 64   Temp 97.6  F (36.4  C) (Temporal)   Resp 20   Ht 1.617 m (5' 3.66\")   Wt 72.7 kg (160 lb 3.2 oz)   SpO2 98%   BMI 27.79 kg/m     Estimated body mass index is 27.79 kg/m  as calculated from the following:    Height as of this encounter: 1.617 m (5' 3.66\").    Weight as of this encounter: " 72.7 kg (160 lb 3.2 oz).    Physical Exam  GENERAL: alert and no distress  EYES: Eyes grossly normal to inspection  HENT: ear canals and TM's normal, nose and mouth without ulcers or lesions  NECK: no adenopathy, no asymmetry, masses, or scars  RESP: lungs clear to auscultation - no rales, rhonchi or wheezes  CV: frequent extasystoles with compensatory pause, normal S1 S2, no S3 or S4, and no murmur, click or rub  ABDOMEN: soft, nontender, without hepatosplenomegaly or masses  MS: no gross musculoskeletal defects noted, no edema  SKIN: no suspicious lesions or rashes  NEURO: Normal strength and tone, mentation intact and speech normal  PSYCH: mentation appears normal, affect normal/bright        3/31/2025   Mini Cog   Clock Draw Score 2 Normal   3 Item Recall 3 objects recalled   Mini Cog Total Score 5         Vision Screen  Reason Vision Screen Not Completed: Screening Recommend: Patient/Guardian Declined    Signed Electronically by: Kenna Garcia PA-C

## 2025-04-15 ENCOUNTER — TELEPHONE (OUTPATIENT)
Dept: CARDIOLOGY | Facility: CLINIC | Age: 80
End: 2025-04-15
Payer: COMMERCIAL

## 2025-04-15 DIAGNOSIS — Z95.2 S/P TAVR (TRANSCATHETER AORTIC VALVE REPLACEMENT): ICD-10-CM

## 2025-04-15 DIAGNOSIS — I48.0 PAROXYSMAL ATRIAL FIBRILLATION (H): ICD-10-CM

## 2025-04-15 DIAGNOSIS — I10 ESSENTIAL HYPERTENSION WITH GOAL BLOOD PRESSURE LESS THAN 140/90: ICD-10-CM

## 2025-04-15 DIAGNOSIS — I45.10 RBBB: ICD-10-CM

## 2025-04-18 RX ORDER — CARVEDILOL 6.25 MG/1
6.25 TABLET ORAL 2 TIMES DAILY WITH MEALS
Qty: 180 TABLET | Refills: 1 | Status: SHIPPED | OUTPATIENT
Start: 2025-04-18 | End: 2025-04-22

## 2025-04-18 NOTE — TELEPHONE ENCOUNTER
"Last Written Prescription:     carvedilol (COREG) 6.25 MG tablet 180 tablet 3 5/2/2024 -- No   Sig - Route: Take 1 tablet (6.25 mg) by mouth 2 times daily (with meals) - Oral     ----------------------  Last Visit Date: 11/29/24   \"Continue rate control with Coreg 6.25 mg BID \"   Future Visit Date: None  ----------------------      Refill decision: Medication refilled per  Medication Refill in Ambulatory Care  policy.     Per updated Cardiology policy\": BP on file in past 12 months   *BP does not need to be normal. ALVANATASHA jeffers elevated BP sent to Cardiology team  03/31/25 (!) 167/90   11/29/24 (!) 152/79   05/02/24 132/73           Request from pharmacy:  Requested Prescriptions   Pending Prescriptions Disp Refills    carvedilol (COREG) 6.25 MG tablet [Pharmacy Med Name: CARVEDILOL 6.25MG TABS] 180 tablet 3     Sig: TAKE ONE TABLET BY MOUTH TWICE A DAY WITH MEALS       Beta-Blockers Protocol Failed - 4/18/2025  2:18 PM        Failed - Most recent blood pressure under 140/90 in past 12 months     BP Readings from Last 3 Encounters:   03/31/25 (!) 167/90   11/29/24 (!) 152/79   05/02/24 132/73       No data recorded            Passed - Patient is age 6 or older        Passed - Medication is active on med list and the sig matches. RN to manually verify dose and sig if red X/fail.     If the protocol passes (green check), you do not need to verify med dose and sig.    A prescription matches if they are the same clinical intention.    For Example: once daily and every morning are the same.    The protocol can not identify upper and lower case letters as matching and will fail.     For Example: Take 1 tablet (50 mg) by mouth daily     TAKE 1 TABLET (50 MG) BY MOUTH DAILY    For all fails (red x), verify dose and sig.    If the refill does match what is on file, the RN can still proceed to approve the refill request.       If they do not match, route to the appropriate provider.             Passed - Medication indicated for " associated diagnosis     Medication is associated with one or more of the following diagnoses:     Hypertension (HTN)   Atrial fibrillation/flutter   Angina   ASCVD   Migraine   Heart Failure   Tremor   Anxiety   Ocular hypertension   Glaucoma   PTSD   Obstructive hypertrophic cardiomyopathy   History of myocarditis   Palpitations   POTS (postural orthostatic tachycardia syndrome)   SVT (supraventricular tachycardia)   Hyperthyroidism   Tachycardia   Acute non-st segment elevation myocardial infarction   Subsequent non-st segment elevation myocardial infarction   Ischemic myocardial dysfunction          Passed - Recent (12 mo) or future (90 days) visit within the authorizing provider's specialty     The patient must have completed an in-person or virtual visit within the past 12 months or has a future visit scheduled within the next 90 days with the authorizing provider s specialty.  Urgent care and e-visits do not qualify as an office visit for this protocol.

## 2025-04-22 RX ORDER — CARVEDILOL 6.25 MG/1
6.25 TABLET ORAL 2 TIMES DAILY WITH MEALS
Qty: 180 TABLET | Refills: 3 | Status: SHIPPED | OUTPATIENT
Start: 2025-04-22

## 2025-05-01 ENCOUNTER — PATIENT OUTREACH (OUTPATIENT)
Dept: CARE COORDINATION | Facility: CLINIC | Age: 80
End: 2025-05-01
Payer: COMMERCIAL

## 2025-05-07 ENCOUNTER — TELEPHONE (OUTPATIENT)
Dept: CARDIOLOGY | Facility: CLINIC | Age: 80
End: 2025-05-07
Payer: COMMERCIAL

## 2025-05-07 NOTE — TELEPHONE ENCOUNTER
[facility] Call Center    Phone Message    May a detailed message be left on voicemail: yes     Reason for Call: Other: Pt would like  a call back to discuss scheduling a sooner appt with Barrera for TAVR follow up for vision loss     Action Taken: Other: Cardio    Travel Screening: Not Applicable     Date of Service:

## 2025-05-12 NOTE — TELEPHONE ENCOUNTER
Telephone call to patient. Bruce has last minute openings for 5/13 and patient agreed to 5/13 11:40 am appointment.    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 Bruce last saw patient in December 2024 and reduced BP medications. Will get blood pressures upon arrival

## 2025-05-13 ENCOUNTER — OFFICE VISIT (OUTPATIENT)
Dept: CARDIOLOGY | Facility: CLINIC | Age: 80
End: 2025-05-13
Attending: NURSE PRACTITIONER
Payer: COMMERCIAL

## 2025-05-13 VITALS
SYSTOLIC BLOOD PRESSURE: 168 MMHG | OXYGEN SATURATION: 98 % | HEART RATE: 65 BPM | DIASTOLIC BLOOD PRESSURE: 92 MMHG | BODY MASS INDEX: 28.26 KG/M2 | WEIGHT: 162.9 LBS

## 2025-05-13 DIAGNOSIS — I10 ESSENTIAL HYPERTENSION WITH GOAL BLOOD PRESSURE LESS THAN 140/90: ICD-10-CM

## 2025-05-13 DIAGNOSIS — I48.0 PAROXYSMAL ATRIAL FIBRILLATION (H): ICD-10-CM

## 2025-05-13 DIAGNOSIS — I48.19 PERSISTENT ATRIAL FIBRILLATION (H): ICD-10-CM

## 2025-05-13 DIAGNOSIS — H53.122 TRANSIENT VISUAL LOSS OF LEFT EYE: ICD-10-CM

## 2025-05-13 DIAGNOSIS — Z95.2 S/P TAVR (TRANSCATHETER AORTIC VALVE REPLACEMENT): Primary | ICD-10-CM

## 2025-05-13 PROCEDURE — G0463 HOSPITAL OUTPT CLINIC VISIT: HCPCS | Performed by: NURSE PRACTITIONER

## 2025-05-13 ASSESSMENT — PAIN SCALES - GENERAL: PAINLEVEL_OUTOF10: NO PAIN (0)

## 2025-05-13 NOTE — PATIENT INSTRUCTIONS
You were seen today in the [facility] at the [facility].    Cardiology provider you saw during your visit: Cherie Barrera NP    Instructions:   Continue Coreg 6.25 mg twice daily and losartan to 25 mg daily  Continue Eliquis 5 mg twice daily for stroke prophylaxis  Have head/neck CTA and ECHO performed   I have placed a neurology referral  For all future dental cleanings and procedures you will need to take antibiotics prior - see instructions below.   Follow-up with me in 6 months     Prevention of Infective (Bacterial) Endocarditis:  You are at increased risk for developing adverse outcomes from infective endocarditis (IE), also known as bacterial endocarditis (BE) because of the new device in your heart. The guidelines for prevention of IE are to give patients antibiotics prior to any dental procedures that involve manipulation of gingival tissue or the periapical region of teeth, or perforation of the oral mucosa:      It is recommended to take Amoxicillin 2 gm by mouth as a single dose 30 to 60 minutes before procedure.     OR if allergic to Penicillin or Ampicillin:     Cephalexin 2 gm by mouth, or  Clindamycin 600 mg by mouth, or  Azithromycin or Clarithromycin 500 mg PO       Questions and scheduling:   First call: Structural Heart  Sonia Rios [phone]    General scheduling line: [phone].   First press #1 for the University and then press #3 for Medical Questions to reach the Cardiology triage nurse.     On Call Cardiologist for after hours or on weekends: [phone], press option #4 and ask to speak to the on-call Cardiologist.

## 2025-05-13 NOTE — PROGRESS NOTES
[facility] HEART [facility]  CARDIOVASCULAR DIVISION    VALVE CLINIC RETURN VISIT    PRIMARY CARDIOLOGIST: Dr. Orellana       PERTINENT CLINICAL HISTORY:     Jenna Kaur is a very pleasant 79 year old female who presents for 1 year TAVR follow-up. She has a history of HTN, HLD, bifascicular block, PVCs, paroxysmal a-fib and severe aortic valvular stenosis that was treated with a 26 mm Catalan Jae 3 Resilia TAVR on 10/25/23 by Lucia Leyva and Lamont. The TAVR and post-procedural course were notable for no complications. Her post procedure ECG showed stable bifasicular block and post procedure echo showed normal TAVR function with mean PG of 9 mmhg with trace PVL. She was discharged on ASA monotherapy and cardiac monitor which showed no high degree block but notable for paroxysmal AF. She was started on Eliquis 5 mg BID and Coreg 6.25 mg BID. She was seen at 1 month and had converted to NSR. TAVR function looked normal on 1 month echo.     Interval History 5/2024:  Last evaluated on 6 months post TAVR. Reported lightheadedness thought related to low BP. Coreg reduced to 6.25 mg BID. Heart monitor showed primarily NSR with 1% AF burden. No heart block, 6.7% PVCs.     Interval History 11/29/24:  Jenna is feeling very well. She is walking 35 minutes x 5 days a week. She has lost 30 lbs over the past year through exercise and watching her diet. She denies any chest pain, SOB, palpitations. No orthopnea, PND, leg swelling.  She continues to experience occasional lightheadedness. Often occurs just sitting in a chair, happened last week while driving in the car, sometimes when standing. It does not occur when going from sitting to standing.  Her BP has been a little low yesterday 99/63 - 120s. She continues to take losartan 50 mg daily and Coreg 6.25 mg BID     Interval History 5/13/25:  Has noticed intermittent vision loss in the left eye over the past 4 months. Maybe every couple of weeks, she'll notice a  curtain/shade lowering into her vision OS. Lasts a couple minutes and clears up on it's own. Has been happening since she had TAVR last year but is becoming more frequent. Notes that she does occasionally see flashes of light in the left eye. Last week had a severe episode where she lost vision in the left eye and had bright flashes. Hasn't had anything since. Saw her optometrist who recommend she see her cardiologist. Otherwise doing well - walking daily without angina or heart failure. /79 this morning, has been consistently 100-120 systolic.      PAST MEDICAL HISTORY:     HTN  HLD  RBBB, LAD, 1st degree AVB  CKD  Severe aortic stenosis s/p TAVR      PAST SURGICAL HISTORY:     Past Surgical History:   Procedure Laterality Date    ABDOMEN SURGERY      CHOLECYSTECTOMY      COLONOSCOPY      CV CORONARY ANGIOGRAM N/A 10/2/2023    Procedure: Coronary Angiogram;  Surgeon: Garett Real MD;  Location: [facility] CARDIAC CATH LAB    CV PCI N/A 10/2/2023    Procedure: Percutaneous Coronary Intervention;  Surgeon: Garett Real MD;  Location: [facility] CARDIAC CATH LAB    CV TRANSCATHETER AORTIC VALVE REPLACEMENT-FEMORAL APPROACH N/A 10/25/2023    Procedure: Transcatheter aortic valve replacement with any indicated procedure. (Catalan);  Surgeon: Colton Leyva MD;  Location:  OR    EYE SURGERY  Cataract surgery 11/2 & 11/16/2021    OR TRANSCATHETER AORTIC VALVE REPLACEMENT, FEMORAL PERCUTANEOUS APPROACH (STANDBY) N/A 10/25/2023    Procedure: OR TRANSCATHETER AORTIC VALVE REPLACEMENT, OPEN FEMORAL ARTERY APPROACH- Catalan;  Surgeon: William Abernathy MD;  Location:  OR        CURRENT MEDICATIONS:     Current Outpatient Medications   Medication Sig Dispense Refill    amoxicillin (AMOXIL) 500 MG capsule Take 4 capsules (2,000 mg) by mouth once as needed (SBE prophyalxis take 30-60 minutes prior to dental procedure or cleaning) 4 capsule 3    apixaban ANTICOAGULANT (ELIQUIS ANTICOAGULANT) 5  MG tablet Take 1 tablet (5 mg) by mouth 2 times daily. 180 tablet 3    Calcium Carbonate-Vitamin D (CALCIUM 500 + D PO) Take 2 tablets by mouth daily Takes 2 tablets      carvedilol (COREG) 6.25 MG tablet Take 1 tablet (6.25 mg) by mouth 2 times daily (with meals). 180 tablet 3    levothyroxine (SYNTHROID/LEVOTHROID) 112 MCG tablet Take 1 tablet (112 mcg) by mouth daily. 90 tablet 3    losartan (COZAAR) 25 MG tablet Take 1 tablet (25 mg) by mouth daily. 90 tablet 3    polyethylene glycol (MIRALAX/GLYCOLAX) powder Take 17 g by mouth daily as needed      simvastatin (ZOCOR) 10 MG tablet Take 1 tablet (10 mg) by mouth at bedtime. 90 tablet 3        ALLERGIES:     Allergies   Allergen Reactions    Erythromycin GI Disturbance     Nausea and sick feeling    Magnesium      States she got diarrhea when given this at the hospital and wants that in her chart        FAMILY HISTORY:     Family History   Problem Relation Age of Onset    Breast Cancer Mother 70    Colon Cancer Father 59    Arrhythmia Brother     Sleep Apnea Brother         SOCIAL HISTORY:     Social History     Socioeconomic History    Marital status:    Tobacco Use    Smoking status: Never    Smokeless tobacco: Never   Vaping Use    Vaping Use: Never used   Substance and Sexual Activity    Alcohol use: Never    Drug use: Never    Sexual activity: Never   Other Topics Concern    Parent/sibling w/ CABG, MI or angioplasty before 65F 55M? No        REVIEW OF SYSTEMS:     Constitutional: No fevers or chills  Skin: No new rash or itching  Eyes: No acute change in vision  Ears/Nose/Throat: No purulent rhinorrhea, new hearing loss, or new vertigo  Respiratory: No cough or hemoptysis  Cardiovascular: See HPI  Gastrointestinal: No change in appetite, vomiting, hematemesis or diarrhea  Genitourinary: No dysuria or hematuria  Musculoskeletal: No new back pain, neck pain or muscle pain  Neurologic: No new headaches, focal weakness or behavior changes  Psychiatric: No  hallucinations, excessive alcohol consumption or illegal drug usage  Hematologic/Lymphatic/Immunologic: No bleeding, chills, fever, night sweats or weight loss  Endocrine: No new cold intolerance, heat intolerance, polyphagia, polydipsia or polyuria      PHYSICAL EXAMINATION:     BP (!) 168/92 (BP Location: Right arm, Patient Position: Chair, Cuff Size: Adult Regular)   Pulse 65   Wt 73.9 kg (162 lb 14.4 oz)   SpO2 98%   BMI 28.26 kg/m      GENERAL: No acute distress.  HEENT: EOMI. Sclerae white, not injected. Nares clear. Pharynx without erythema or exudate.   Neck: No adenopathy. No thyromegaly. No jugular venous distension.   Heart: PVCs noted no murmur  Lungs: Clear to auscultation. No ronchi, wheezes, rales.   Abdomen: Soft, nontender, nondistended. Bowel sounds present.  Extremities: No clubbing, cyanosis, or edema.   Neurologic: Alert and oriented to person/place/time, normal speech and affect. No focal deficits.  Skin: No petechiae, purpura or rash.     LABORATORY DATA:     LIPID RESULTS:  Lab Results   Component Value Date    CHOL 165 03/31/2025    CHOL 162 12/11/2020    HDL 64 03/31/2025    HDL 65 12/11/2020    LDL 75 03/31/2025    LDL 76 12/11/2020    TRIG 130 03/31/2025    TRIG 106 12/11/2020       LIVER ENZYME RESULTS:  Lab Results   Component Value Date    AST 16 11/29/2024    ALT 12 11/29/2024       CBC RESULTS:  Lab Results   Component Value Date    WBC 6.6 11/29/2024    WBC 7.1 11/01/2018    RBC 4.78 11/29/2024    RBC 4.61 11/01/2018    HGB 14.7 11/29/2024    HGB 14.3 11/01/2018    HCT 43.0 11/29/2024    HCT 42.5 11/01/2018    MCV 90 11/29/2024    MCV 92 11/01/2018    MCH 30.8 11/29/2024    MCH 31.0 11/01/2018    MCHC 34.2 11/29/2024    MCHC 33.6 11/01/2018    RDW 11.9 11/29/2024    RDW 12.2 11/01/2018     11/29/2024     11/01/2018       BMP RESULTS:  Lab Results   Component Value Date     03/31/2025     02/10/2021    POTASSIUM 4.8 03/31/2025    POTASSIUM 4.1 02/22/2023     POTASSIUM 4.5 02/10/2021    CHLORIDE 105 03/31/2025    CHLORIDE 111 (H) 02/22/2023    CHLORIDE 105 02/10/2021    CO2 28 03/31/2025    CO2 28 02/22/2023    CO2 31 02/10/2021    ANIONGAP 10 03/31/2025    ANIONGAP 5 02/22/2023    ANIONGAP 3 02/10/2021     (H) 03/31/2025     (H) 10/25/2023     (H) 02/22/2023     (H) 02/10/2021    BUN 17.7 03/31/2025    BUN 25 02/22/2023    BUN 23 02/10/2021    CR 0.79 03/31/2025    CR 1.00 02/10/2021    GFRESTIMATED 75 03/31/2025    GFRESTIMATED 54 (L) 02/10/2021    GFRESTBLACK 63 02/10/2021    JOSE 10.0 03/31/2025    JOSE 9.1 02/10/2021        A1C RESULTS:  Lab Results   Component Value Date    A1C 5.2 01/31/2022    A1C 5.4 11/01/2018       INR RESULTS:  Lab Results   Component Value Date    INR 1.20 (H) 10/23/2023    INR 1.15 10/02/2023          PROCEDURES & FURTHER ASSESSMENTS:     ECHO 11/29/24  Interpretation Summary  Global and regional left ventricular function is normal with an EF of 55-60%.  Right ventricular function, chamber size, wall motion, and thickness are  normal.  S/P TAVR with 26mm Catalan Jae 3 Resilia valve 10/25/23. Mean aortic  gradient 7mmHg. No AI.  The inferior vena cava is normal.  No pericardial effusion is present.  No significant changes noted.  ____________________________    EKG 11/29/24  NSR W/PVCS, LAD and RBBB    Cardiac monitor 5/2024:        EKG 5/2/24:  SB with HR 58 bifascicular block     ECHO 11/27/23  ______________________________________________________________________________  Interpretation Summary  TAVR with 26mm Catalan Jae 3 Resilia valve 10/25/23.  The mean gradient across the aortic valve is 8 mmHg.  Trace paravalvular AI.  No significant changes noted.  ______________________________________________________________________________  Left Ventricle  TAVR with 26mm Catalan Jae 3 Resilia valve 10/25/23. Global and regional  left ventricular function is normal with an EF of 55-60%. Left ventricular  size  is normal. Mild concentric wall thickening consistent with left  ventricular hypertrophy is present. Grade I or early diastolic dysfunction. No  regional wall motion abnormalities are seen.     Right Ventricle  Right ventricular function, chamber size, wall motion, and thickness are  normal.     Atria  Both atria appear normal. The atrial septum is intact as assessed by color  Doppler .     Mitral Valve  The mitral valve is normal. Trace mitral insufficiency is present.     Aortic Valve  The mean gradient across the aortic valve is 8 mmHg. Trace paravalvular AI.     Tricuspid Valve  The tricuspid valve is normal. Trace tricuspid insufficiency is present. The  right ventricular systolic pressure is approximated at 21.0 mmHg plus the  right atrial pressure. Pulmonary artery systolic pressure is normal.     Pulmonic Valve  The pulmonic valve is normal. Trace pulmonic insufficiency is present.     Vessels  The thoracic aorta is normal. The pulmonary artery and bifurcation cannot be  assessed. The inferior vena cava is normal.     Pericardium  No pericardial effusion is present.     Compared to Previous Study  No significant changes noted.    EKG 11/27/23:  NSR baseline bifasicular block       EKG 10/30/23     AF with RVR         EKG 10/26/23 POD 1:       Echocardiogram 10/26/23 POD 1:  Interpretation Summary  TAVR with 26mm Catalan Jae 3 Resilia valve 10/25/23. The mean gradient  across the aortic valve is 9 mmHg. There is trace paravalvular regurgitation.  Global and regional left ventricular function is normal with an EF of 60-65%.  Global right ventricular function is normal.  IVC diameter <2.1 cm collapsing >50% with sniff suggests a normal RA pressure  of 3 mmHg.  No pericardial effusion is present.     TAVR 10/25/23:  Plan      Follow bedrest per protocol   Continued medical management and lifestyle modifications for cardiovascular risk factor optimizations.   Admit to inpatient        1. Aspirin 81 mg po daily  lifelong.  2. Bedrest per protocol.  3. Admit to the primary inpatient team for further evaluation and management.  4. Echocardiogram tomorrow.   5. Lifelong antibiotic prophylaxis prior to all dental procedures.  6. Plan for ambulatory heart rhythm monitor due to baseline RBBB.        NYHA Class: I     CLINICAL IMPRESSION:     Jenna Kaur is a 79 year old female who presents for 1 year TAVR f/up.     Transient vision loss OS/flashes of light, progressive:  - Saw optometrist who was concerned for cardiac etiology   - Plan for CTA Head/Neck to rule out CVA/severe atherosclerotic disease   - Repeat ECHO to rule out cardiac etiology   - No monitor as unlikely that AF or heart block would cause these symptoms   - Neuro referral if above work-up is unremarkable    2. Severe aortic stenosis s/p TAVR w/ 26 mm ESV 10/25/23:  3. Chronic diastolic heart failure, resolved post TAVR:  S/p TAVR with 26 mm Edward Jae valve without complication. POD#1 echo with mean PG 9 mmHg with trace AI. Last ECHO 11/2024 showed normal TAVR function with mean PG 7 mmHg without AI.   - Continue Eliquis 5 mg BID for AF   - SBE prophylaxis lifelong   - Repeat echo pending     4. Hx bifasicular block:  5. Paroxysmal AF, new onset post TAVR:  6. Frequent PVCs:  Hx of bifascicular block. Cardiac monitor placed post TAVR showing no evidence of high degree AVB but notable for new onset paroxysmal AF (6% burden rates in low 100s in AF). She was asymptomatic and spontaneously converted to NSR. She was treated with rate control and AC. Last evaluated at 6 months post TAVR - reported lightheadedness, Coreg reduced due to low BP. Cardiac monitor showed 1 AF episode, no high degree block, 6.7% PVCs. Feeling well no AF symptoms.   - Continue Eliquis 5 mg BID for stroke prophylaxis for [id number] of 4 (HTN, age, gender)   - Continue rate control with Coreg 6.25 mg BID      7. HLD, well controlled LDL 70:   8. HTN, low normal:  9. Minimal CAD, no  angina:  BP normal, no dizziness.   Continue losartan 25 mg daily and Coreg   Continue statin, defer ASA while on eliquis     RTC: RTC 6 months with JUAN CARLOS Rose, CNP  [facility] Cardiology Team      CC  Patient Care Team:  Kenna Garcia PA-C as PCP - General (Physician Assistant)  Kenna Gacria PA-C as Assigned PCP  BELLO Haas MD as MD (Cardiovascular Disease)  Cherie Barrera NP as Assigned Heart and Vascular Provider  Zabrina Forman, RN as Registered Nurse

## 2025-05-13 NOTE — LETTER
5/13/2025      RE: Jenna Kaur  [address] [facility] [address] [place] [address]       Dear Colleague,    Thank you for the opportunity to participate in the care of your patient, Jenna Karu, at the [facility] [place] at [facility]. Please see a copy of my visit note below.        [facility] HEART CARE  CARDIOVASCULAR DIVISION    VALVE CLINIC RETURN VISIT    PRIMARY CARDIOLOGIST: Dr. Orellana       PERTINENT CLINICAL HISTORY:     Jenna Kaur is a very pleasant 79 year old female who presents for 1 year TAVR follow-up. She has a history of HTN, HLD, bifascicular block, PVCs, paroxysmal a-fib and severe aortic valvular stenosis that was treated with a 26 mm Catalan Jae 3 Resilia TAVR on 10/25/23 by Lucia Leyva and Lamont. The TAVR and post-procedural course were notable for no complications. Her post procedure ECG showed stable bifasicular block and post procedure echo showed normal TAVR function with mean PG of 9 mmhg with trace PVL. She was discharged on ASA monotherapy and cardiac monitor which showed no high degree block but notable for paroxysmal AF. She was started on Eliquis 5 mg BID and Coreg 6.25 mg BID. She was seen at 1 month and had converted to NSR. TAVR function looked normal on 1 month echo.     Interval History 5/2024:  Last evaluated on 6 months post TAVR. Reported lightheadedness thought related to low BP. Coreg reduced to 6.25 mg BID. Heart monitor showed primarily NSR with 1% AF burden. No heart block, 6.7% PVCs.     Interval History 11/29/24:  Jenna is feeling very well. She is walking 35 minutes x 5 days a week. She has lost 30 lbs over the past year through exercise and watching her diet. She denies any chest pain, SOB, palpitations. No orthopnea, PND, leg swelling.  She continues to experience occasional lightheadedness. Often occurs just sitting in a chair, happened last week while driving in the car, sometimes  when standing. It does not occur when going from sitting to standing.  Her BP has been a little low yesterday 99/63 - 120s. She continues to take losartan 50 mg daily and Coreg 6.25 mg BID     Interval History 5/13/25:  Has noticed intermittent vision loss in the left eye over the past 4 months. Maybe every couple of weeks, she'll notice a curtain/shade lowering into her vision OS. Lasts a couple minutes and clears up on it's own. Has been happening since she had TAVR last year but is becoming more frequent. Notes that she does occasionally see flashes of light in the left eye. Last week had a severe episode where she lost vision in the left eye and had bright flashes. Hasn't had anything since. Saw her optometrist who recommend she see her cardiologist. Otherwise doing well - walking daily without angina or heart failure. /79 this morning, has been consistently 100-120 systolic.      PAST MEDICAL HISTORY:     HTN  HLD  RBBB, LAD, 1st degree AVB  CKD  Severe aortic stenosis s/p TAVR      PAST SURGICAL HISTORY:     Past Surgical History:   Procedure Laterality Date     ABDOMEN SURGERY       CHOLECYSTECTOMY       COLONOSCOPY       CV CORONARY ANGIOGRAM N/A 10/2/2023    Procedure: Coronary Angiogram;  Surgeon: Garett Real MD;  Location: [facility] CARDIAC CATH LAB     CV PCI N/A 10/2/2023    Procedure: Percutaneous Coronary Intervention;  Surgeon: Garett Real MD;  Location: [facility] CARDIAC CATH LAB     CV TRANSCATHETER AORTIC VALVE REPLACEMENT-FEMORAL APPROACH N/A 10/25/2023    Procedure: Transcatheter aortic valve replacement with any indicated procedure. (Catalan);  Surgeon: Colton Leyva MD;  Location:  OR     EYE SURGERY  Cataract surgery 11/2 & 11/16/2021     OR TRANSCATHETER AORTIC VALVE REPLACEMENT, FEMORAL PERCUTANEOUS APPROACH (STANDBY) N/A 10/25/2023    Procedure: OR TRANSCATHETER AORTIC VALVE REPLACEMENT, OPEN FEMORAL ARTERY APPROACH- Catalan;  Surgeon: William Abernathy  MD Martin;  Location:  OR        CURRENT MEDICATIONS:     Current Outpatient Medications   Medication Sig Dispense Refill     amoxicillin (AMOXIL) 500 MG capsule Take 4 capsules (2,000 mg) by mouth once as needed (SBE prophyalxis take 30-60 minutes prior to dental procedure or cleaning) 4 capsule 3     apixaban ANTICOAGULANT (ELIQUIS ANTICOAGULANT) 5 MG tablet Take 1 tablet (5 mg) by mouth 2 times daily. 180 tablet 3     Calcium Carbonate-Vitamin D (CALCIUM 500 + D PO) Take 2 tablets by mouth daily Takes 2 tablets       carvedilol (COREG) 6.25 MG tablet Take 1 tablet (6.25 mg) by mouth 2 times daily (with meals). 180 tablet 3     levothyroxine (SYNTHROID/LEVOTHROID) 112 MCG tablet Take 1 tablet (112 mcg) by mouth daily. 90 tablet 3     losartan (COZAAR) 25 MG tablet Take 1 tablet (25 mg) by mouth daily. 90 tablet 3     polyethylene glycol (MIRALAX/GLYCOLAX) powder Take 17 g by mouth daily as needed       simvastatin (ZOCOR) 10 MG tablet Take 1 tablet (10 mg) by mouth at bedtime. 90 tablet 3        ALLERGIES:     Allergies   Allergen Reactions     Erythromycin GI Disturbance     Nausea and sick feeling     Magnesium      States she got diarrhea when given this at the hospital and wants that in her chart        FAMILY HISTORY:     Family History   Problem Relation Age of Onset     Breast Cancer Mother 70     Colon Cancer Father 59     Arrhythmia Brother      Sleep Apnea Brother         SOCIAL HISTORY:     Social History     Socioeconomic History     Marital status:    Tobacco Use     Smoking status: Never     Smokeless tobacco: Never   Vaping Use     Vaping Use: Never used   Substance and Sexual Activity     Alcohol use: Never     Drug use: Never     Sexual activity: Never   Other Topics Concern     Parent/sibling w/ CABG, MI or angioplasty before 65F 55M? No        REVIEW OF SYSTEMS:     Constitutional: No fevers or chills  Skin: No new rash or itching  Eyes: No acute change in vision  Ears/Nose/Throat: No  purulent rhinorrhea, new hearing loss, or new vertigo  Respiratory: No cough or hemoptysis  Cardiovascular: See HPI  Gastrointestinal: No change in appetite, vomiting, hematemesis or diarrhea  Genitourinary: No dysuria or hematuria  Musculoskeletal: No new back pain, neck pain or muscle pain  Neurologic: No new headaches, focal weakness or behavior changes  Psychiatric: No hallucinations, excessive alcohol consumption or illegal drug usage  Hematologic/Lymphatic/Immunologic: No bleeding, chills, fever, night sweats or weight loss  Endocrine: No new cold intolerance, heat intolerance, polyphagia, polydipsia or polyuria      PHYSICAL EXAMINATION:     BP (!) 168/92 (BP Location: Right arm, Patient Position: Chair, Cuff Size: Adult Regular)   Pulse 65   Wt 73.9 kg (162 lb 14.4 oz)   SpO2 98%   BMI 28.26 kg/m      GENERAL: No acute distress.  HEENT: EOMI. Sclerae white, not injected. Nares clear. Pharynx without erythema or exudate.   Neck: No adenopathy. No thyromegaly. No jugular venous distension.   Heart: PVCs noted no murmur  Lungs: Clear to auscultation. No ronchi, wheezes, rales.   Abdomen: Soft, nontender, nondistended. Bowel sounds present.  Extremities: No clubbing, cyanosis, or edema.   Neurologic: Alert and oriented to person/place/time, normal speech and affect. No focal deficits.  Skin: No petechiae, purpura or rash.     LABORATORY DATA:     LIPID RESULTS:  Lab Results   Component Value Date    CHOL 165 03/31/2025    CHOL 162 12/11/2020    HDL 64 03/31/2025    HDL 65 12/11/2020    LDL 75 03/31/2025    LDL 76 12/11/2020    TRIG 130 03/31/2025    TRIG 106 12/11/2020       LIVER ENZYME RESULTS:  Lab Results   Component Value Date    AST 16 11/29/2024    ALT 12 11/29/2024       CBC RESULTS:  Lab Results   Component Value Date    WBC 6.6 11/29/2024    WBC 7.1 11/01/2018    RBC 4.78 11/29/2024    RBC 4.61 11/01/2018    HGB 14.7 11/29/2024    HGB 14.3 11/01/2018    HCT 43.0 11/29/2024    HCT 42.5 11/01/2018     MCV 90 11/29/2024    MCV 92 11/01/2018    MCH 30.8 11/29/2024    MCH 31.0 11/01/2018    MCHC 34.2 11/29/2024    MCHC 33.6 11/01/2018    RDW 11.9 11/29/2024    RDW 12.2 11/01/2018     11/29/2024     11/01/2018       BMP RESULTS:  Lab Results   Component Value Date     03/31/2025     02/10/2021    POTASSIUM 4.8 03/31/2025    POTASSIUM 4.1 02/22/2023    POTASSIUM 4.5 02/10/2021    CHLORIDE 105 03/31/2025    CHLORIDE 111 (H) 02/22/2023    CHLORIDE 105 02/10/2021    CO2 28 03/31/2025    CO2 28 02/22/2023    CO2 31 02/10/2021    ANIONGAP 10 03/31/2025    ANIONGAP 5 02/22/2023    ANIONGAP 3 02/10/2021     (H) 03/31/2025     (H) 10/25/2023     (H) 02/22/2023     (H) 02/10/2021    BUN 17.7 03/31/2025    BUN 25 02/22/2023    BUN 23 02/10/2021    CR 0.79 03/31/2025    CR 1.00 02/10/2021    GFRESTIMATED 75 03/31/2025    GFRESTIMATED 54 (L) 02/10/2021    GFRESTBLACK 63 02/10/2021    JOSE 10.0 03/31/2025    JOSE 9.1 02/10/2021        A1C RESULTS:  Lab Results   Component Value Date    A1C 5.2 01/31/2022    A1C 5.4 11/01/2018       INR RESULTS:  Lab Results   Component Value Date    INR 1.20 (H) 10/23/2023    INR 1.15 10/02/2023          PROCEDURES & FURTHER ASSESSMENTS:     ECHO 11/29/24  Interpretation Summary  Global and regional left ventricular function is normal with an EF of 55-60%.  Right ventricular function, chamber size, wall motion, and thickness are  normal.  S/P TAVR with 26mm Catalan Jae 3 Resilia valve 10/25/23. Mean aortic  gradient 7mmHg. No AI.  The inferior vena cava is normal.  No pericardial effusion is present.  No significant changes noted.  ____________________________    EKG 11/29/24  NSR W/PVCS, LAD and RBBB    Cardiac monitor 5/2024:        EKG 5/2/24:  SB with HR 58 bifascicular block     ECHO 11/27/23  ______________________________________________________________________________  Interpretation Summary  TAVR with 26mm Catalan Jae 3 Resilia  valve 10/25/23.  The mean gradient across the aortic valve is 8 mmHg.  Trace paravalvular AI.  No significant changes noted.  ______________________________________________________________________________  Left Ventricle  TAVR with 26mm Catalan Jae 3 Resilia valve 10/25/23. Global and regional  left ventricular function is normal with an EF of 55-60%. Left ventricular  size is normal. Mild concentric wall thickening consistent with left  ventricular hypertrophy is present. Grade I or early diastolic dysfunction. No  regional wall motion abnormalities are seen.     Right Ventricle  Right ventricular function, chamber size, wall motion, and thickness are  normal.     Atria  Both atria appear normal. The atrial septum is intact as assessed by color  Doppler .     Mitral Valve  The mitral valve is normal. Trace mitral insufficiency is present.     Aortic Valve  The mean gradient across the aortic valve is 8 mmHg. Trace paravalvular AI.     Tricuspid Valve  The tricuspid valve is normal. Trace tricuspid insufficiency is present. The  right ventricular systolic pressure is approximated at 21.0 mmHg plus the  right atrial pressure. Pulmonary artery systolic pressure is normal.     Pulmonic Valve  The pulmonic valve is normal. Trace pulmonic insufficiency is present.     Vessels  The thoracic aorta is normal. The pulmonary artery and bifurcation cannot be  assessed. The inferior vena cava is normal.     Pericardium  No pericardial effusion is present.     Compared to Previous Study  No significant changes noted.    EKG 11/27/23:  NSR baseline bifasicular block       EKG 10/30/23     AF with RVR         EKG 10/26/23 POD 1:       Echocardiogram 10/26/23 POD 1:  Interpretation Summary  TAVR with 26mm Catalan Jae 3 Resilia valve 10/25/23. The mean gradient  across the aortic valve is 9 mmHg. There is trace paravalvular regurgitation.  Global and regional left ventricular function is normal with an EF of 60-65%.  Global  right ventricular function is normal.  IVC diameter <2.1 cm collapsing >50% with sniff suggests a normal RA pressure  of 3 mmHg.  No pericardial effusion is present.     TAVR 10/25/23:  Plan       Follow bedrest per protocol    Continued medical management and lifestyle modifications for cardiovascular risk factor optimizations.    Admit to inpatient        1. Aspirin 81 mg po daily lifelong.  2. Bedrest per protocol.  3. Admit to the primary inpatient team for further evaluation and management.  4. Echocardiogram tomorrow.   5. Lifelong antibiotic prophylaxis prior to all dental procedures.  6. Plan for ambulatory heart rhythm monitor due to baseline RBBB.        NYHA Class: I     CLINICAL IMPRESSION:     Jenna Kaur is a 79 year old female who presents for 1 year TAVR f/up.     Transient vision loss OS/flashes of light, progressive:  - Saw optometrist who was concerned for cardiac etiology   - Plan for CTA Head/Neck to rule out CVA/severe atherosclerotic disease   - Repeat ECHO to rule out cardiac etiology   - No monitor as unlikely that AF or heart block would cause these symptoms   - Neuro referral if above work-up is unremarkable    2. Severe aortic stenosis s/p TAVR w/ 26 mm ESV 10/25/23:  3. Chronic diastolic heart failure, resolved post TAVR:  S/p TAVR with 26 mm Edward Jae valve without complication. POD#1 echo with mean PG 9 mmHg with trace AI. Last ECHO 11/2024 showed normal TAVR function with mean PG 7 mmHg without AI.   - Continue Eliquis 5 mg BID for AF   - SBE prophylaxis lifelong   - Repeat echo pending     4. Hx bifasicular block:  5. Paroxysmal AF, new onset post TAVR:  6. Frequent PVCs:  Hx of bifascicular block. Cardiac monitor placed post TAVR showing no evidence of high degree AVB but notable for new onset paroxysmal AF (6% burden rates in low 100s in AF). She was asymptomatic and spontaneously converted to NSR. She was treated with rate control and AC. Last evaluated at 6 months post  TAVR - reported lightheadedness, Coreg reduced due to low BP. Cardiac monitor showed 1 AF episode, no high degree block, 6.7% PVCs. Feeling well no AF symptoms.   - Continue Eliquis 5 mg BID for stroke prophylaxis for [id number] of 4 (HTN, age, gender)   - Continue rate control with Coreg 6.25 mg BID      7. HLD, well controlled LDL 70:   8. HTN, low normal:  9. Minimal CAD, no angina:  BP normal, no dizziness.   Continue losartan 25 mg daily and Coreg   Continue statin, defer ASA while on eliquis     RTC: RTC 6 months with me     JUAN CARLOS Andrade, CNP  [facility] Cardiology Team      CC  Patient Care Team:  Kenna Garcia PA-C as PCP - General (Physician Assistant)  Kenna Garcia PA-C as Assigned PCP  BELLO Haas MD as MD (Cardiovascular Disease)  Cherie Barrera NP as Assigned Heart and Vascular Provider  Zabrina Forman RN as Registered Nurse          Please do not hesitate to contact me if you have any questions/concerns.     Sincerely,     Cherie Barrera NP

## 2025-05-13 NOTE — NURSING NOTE
Chief Complaint   Patient presents with    Follow Up     RETURN TAVR     Vitals were taken and medications reconciled.    Abdirizak Crowley, EMT  11:55 AM

## 2025-05-14 ENCOUNTER — PATIENT OUTREACH (OUTPATIENT)
Dept: CARE COORDINATION | Facility: CLINIC | Age: 80
End: 2025-05-14
Payer: COMMERCIAL

## 2025-05-27 ENCOUNTER — ANCILLARY PROCEDURE (OUTPATIENT)
Dept: CT IMAGING | Facility: CLINIC | Age: 80
End: 2025-05-27
Attending: NURSE PRACTITIONER
Payer: COMMERCIAL

## 2025-05-27 ENCOUNTER — ANCILLARY PROCEDURE (OUTPATIENT)
Dept: CARDIOLOGY | Facility: CLINIC | Age: 80
End: 2025-05-27
Attending: NURSE PRACTITIONER
Payer: COMMERCIAL

## 2025-05-27 DIAGNOSIS — I45.10 RBBB: ICD-10-CM

## 2025-05-27 DIAGNOSIS — Z95.2 S/P TAVR (TRANSCATHETER AORTIC VALVE REPLACEMENT): ICD-10-CM

## 2025-05-27 DIAGNOSIS — I48.0 PAROXYSMAL ATRIAL FIBRILLATION (H): ICD-10-CM

## 2025-05-27 DIAGNOSIS — I10 ESSENTIAL HYPERTENSION WITH GOAL BLOOD PRESSURE LESS THAN 140/90: ICD-10-CM

## 2025-05-27 LAB
CREAT BLD-MCNC: 0.9 MG/DL (ref 0.5–1)
EGFRCR SERPLBLD CKD-EPI 2021: >60 ML/MIN/1.73M2
LVEF ECHO: NORMAL

## 2025-05-27 PROCEDURE — 70498 CT ANGIOGRAPHY NECK: CPT | Mod: GC | Performed by: RADIOLOGY

## 2025-05-27 PROCEDURE — 70496 CT ANGIOGRAPHY HEAD: CPT | Mod: GC | Performed by: RADIOLOGY

## 2025-05-27 PROCEDURE — 82565 ASSAY OF CREATININE: CPT | Performed by: PATHOLOGY

## 2025-05-27 RX ORDER — IOPAMIDOL 755 MG/ML
70 INJECTION, SOLUTION INTRAVASCULAR ONCE
Status: COMPLETED | OUTPATIENT
Start: 2025-05-27 | End: 2025-05-27

## 2025-05-27 RX ADMIN — IOPAMIDOL 70 ML: 755 INJECTION, SOLUTION INTRAVASCULAR at 15:19

## 2025-05-27 RX ADMIN — Medication 5 ML: at 16:39

## 2025-05-27 NOTE — DISCHARGE INSTRUCTIONS

## 2025-05-28 ENCOUNTER — RESULTS FOLLOW-UP (OUTPATIENT)
Dept: CARDIOLOGY | Facility: CLINIC | Age: 80
End: 2025-05-28

## 2025-06-04 ENCOUNTER — RESULTS FOLLOW-UP (OUTPATIENT)
Dept: CARDIOLOGY | Facility: OTHER | Age: 80
End: 2025-06-04

## 2025-06-13 ENCOUNTER — RESULTS FOLLOW-UP (OUTPATIENT)
Dept: FAMILY MEDICINE | Facility: CLINIC | Age: 80
End: 2025-06-13

## 2025-07-15 NOTE — TELEPHONE ENCOUNTER
REASON FOR VISIT: Transient vision loss   PROVIDER: Yury Arroyo,    DATE OF APPT: 10/8/25    NOTES (FOR ALL VISITS) STATUS DETAILS   OFFICE NOTE from referring provider Internal 5/13/25 Cherie Barrera NP @[facility]     OFFICE NOTE from other specialist Care Everywhere 6/13/25 Kenna Garcia PA-C @[facility]    4/3/25 Jose Rico OD  @[facility]     MEDICATION LIST Internal    IMAGING  (FOR ALL VISITS)     CT (HEAD, NECK, SPINE) Internal [facility]  5/27/25 CTA Head Neck

## 2025-07-17 ENCOUNTER — PATIENT OUTREACH (OUTPATIENT)
Dept: CARE COORDINATION | Facility: CLINIC | Age: 80
End: 2025-07-17

## 2025-08-29 ENCOUNTER — PRE VISIT (OUTPATIENT)
Dept: NEUROLOGY | Facility: CLINIC | Age: 80
End: 2025-08-29

## 2025-09-03 ENCOUNTER — ANCILLARY PROCEDURE (OUTPATIENT)
Dept: MAMMOGRAPHY | Facility: CLINIC | Age: 80
End: 2025-09-03
Attending: PHYSICIAN ASSISTANT
Payer: COMMERCIAL

## 2025-09-03 DIAGNOSIS — Z12.31 VISIT FOR SCREENING MAMMOGRAM: ICD-10-CM

## 2025-09-03 PROCEDURE — 77067 SCR MAMMO BI INCL CAD: CPT | Mod: TC | Performed by: RADIOLOGY

## 2025-09-03 PROCEDURE — 77063 BREAST TOMOSYNTHESIS BI: CPT | Mod: TC | Performed by: RADIOLOGY

## 2025-10-08 ENCOUNTER — PRE VISIT (OUTPATIENT)
Dept: NEUROLOGY | Facility: CLINIC | Age: 80
End: 2025-10-08

## (undated) DEVICE — TUBING PRESSURE 30"

## (undated) DEVICE — ESU GROUND PAD ADULT W/CORD E7507

## (undated) DEVICE — CATH ANGIO INFINITI PIGTAIL 145 6 SH 6FRX110CM  534-652S

## (undated) DEVICE — INTRO SHEATH 6FRX25CM PINNACLE RSS606

## (undated) DEVICE — GW VASC .035IN DIA 260CML 7CML 3 MM RADIUS J CURVE 502455

## (undated) DEVICE — WIRE GUIDE 0.035"X150CM EMERALD STR 502542

## (undated) DEVICE — DRAPE STERI FLUOROSCOPE 35X43"1012 LATEX FREE

## (undated) DEVICE — DRAPE CV SPLIT TIBURON 87"X136.5" 9155

## (undated) DEVICE — PREP CHLORAPREP 26ML TINTED HI-LITE ORANGE 930815

## (undated) DEVICE — PACK GOWN 3/PK DISP XL SBA32GPFCB

## (undated) DEVICE — KIT HAND CONTROL ACIST 016795

## (undated) DEVICE — MANIFOLD KIT ANGIO AUTOMATED 014613

## (undated) DEVICE — CLOSURE DEVICE 6FR VASC PROGLIDE MEDICATED SUTURE 12673-03

## (undated) DEVICE — SLEEVE TR BAND RADIAL COMPRESSION DEVICE 24CM TRB24-REG

## (undated) DEVICE — DRSG TEGADERM 4X4 3/4" 1626W

## (undated) DEVICE — Device

## (undated) DEVICE — CATH BALLOON Z-MED II 9FR 22MMX4X100CM 611761

## (undated) DEVICE — BOWL STERILE 32OZ DYND50320

## (undated) DEVICE — PACK HEART LEFT CUSTOM

## (undated) DEVICE — KIT VALVE AORTIC SAPIEN 3 ULTRA RESILIA OD26 MM S3URCM26A

## (undated) DEVICE — INTRO SHEATH 7FRX10CM PINNACLE RSS702

## (undated) DEVICE — VALVE DELIVERY SYSTEM 26MM SAPIEN3 ULTRA COMMANDER 9750CM26

## (undated) DEVICE — CABLE PACING ALLIGATOR CLIP 12FT 5833SL

## (undated) DEVICE — PACK HEART RIGHT CUSTOM SAN32RHF18

## (undated) DEVICE — SAVVYWIRE XS

## (undated) DEVICE — CATH ANGIO SUPERTORQUE AL1 6FRX100CM 532-645

## (undated) DEVICE — INFLATION DEVICE ATRION QL2530

## (undated) DEVICE — DRAPE IOBAN INCISE 23X17" 6650EZ

## (undated) DEVICE — SHTH INTRO 0.021IN ID 6FR DIA

## (undated) DEVICE — RAD CLOSURE ANGIOSEAL 8FR  610131

## (undated) DEVICE — WIRE GUIDE LUNDERQUIST 0.035"X260CM DBL CVD

## (undated) DEVICE — WIRE GUIDE 0.035"X150CM EMERALD J TIP 502521

## (undated) DEVICE — LINEN TOWEL PACK X30 5481

## (undated) DEVICE — DRSG PRIMAPORE 02X3" 7133

## (undated) DEVICE — INTRO SHEATH 7FRX25CM PINNACLE RSS706

## (undated) DEVICE — SYR 50ML LL W/O NDL 309653

## (undated) DEVICE — SPONGE RAY-TEC 4X8" 7318

## (undated) RX ORDER — PROPOFOL 10 MG/ML
INJECTION, EMULSION INTRAVENOUS
Status: DISPENSED
Start: 2023-10-25

## (undated) RX ORDER — ASPIRIN 81 MG/1
TABLET, CHEWABLE ORAL
Status: DISPENSED
Start: 2023-10-02

## (undated) RX ORDER — ACETAMINOPHEN 325 MG/1
TABLET ORAL
Status: DISPENSED
Start: 2023-10-25

## (undated) RX ORDER — HYDROMORPHONE HCL IN WATER/PF 6 MG/30 ML
PATIENT CONTROLLED ANALGESIA SYRINGE INTRAVENOUS
Status: DISPENSED
Start: 2023-10-25

## (undated) RX ORDER — ONDANSETRON 2 MG/ML
INJECTION INTRAMUSCULAR; INTRAVENOUS
Status: DISPENSED
Start: 2023-10-25

## (undated) RX ORDER — NICARDIPINE HCL-0.9% SOD CHLOR 1 MG/10 ML
SYRINGE (ML) INTRAVENOUS
Status: DISPENSED
Start: 2023-10-02

## (undated) RX ORDER — FENTANYL CITRATE 50 UG/ML
INJECTION, SOLUTION INTRAMUSCULAR; INTRAVENOUS
Status: DISPENSED
Start: 2023-10-25

## (undated) RX ORDER — BUPIVACAINE HYDROCHLORIDE 5 MG/ML
INJECTION, SOLUTION EPIDURAL; INTRACAUDAL
Status: DISPENSED
Start: 2023-10-25

## (undated) RX ORDER — HEPARIN SODIUM 1000 [USP'U]/ML
INJECTION, SOLUTION INTRAVENOUS; SUBCUTANEOUS
Status: DISPENSED
Start: 2023-10-25

## (undated) RX ORDER — FENTANYL CITRATE 50 UG/ML
INJECTION, SOLUTION INTRAMUSCULAR; INTRAVENOUS
Status: DISPENSED
Start: 2023-10-02

## (undated) RX ORDER — NITROGLYCERIN 5 MG/ML
VIAL (ML) INTRAVENOUS
Status: DISPENSED
Start: 2023-10-02

## (undated) RX ORDER — SODIUM CHLORIDE 9 MG/ML
INJECTION, SOLUTION INTRAVENOUS
Status: DISPENSED
Start: 2023-10-02

## (undated) RX ORDER — PROTAMINE SULFATE 10 MG/ML
INJECTION, SOLUTION INTRAVENOUS
Status: DISPENSED
Start: 2023-10-25

## (undated) RX ORDER — ESMOLOL HYDROCHLORIDE 10 MG/ML
INJECTION INTRAVENOUS
Status: DISPENSED
Start: 2023-10-25

## (undated) RX ORDER — LIDOCAINE HYDROCHLORIDE 10 MG/ML
INJECTION, SOLUTION EPIDURAL; INFILTRATION; INTRACAUDAL; PERINEURAL
Status: DISPENSED
Start: 2023-10-25

## (undated) RX ORDER — FENTANYL CITRATE-0.9 % NACL/PF 10 MCG/ML
PLASTIC BAG, INJECTION (ML) INTRAVENOUS
Status: DISPENSED
Start: 2023-10-25

## (undated) RX ORDER — LIDOCAINE HYDROCHLORIDE 10 MG/ML
INJECTION, SOLUTION EPIDURAL; INFILTRATION; INTRACAUDAL; PERINEURAL
Status: DISPENSED
Start: 2023-10-02

## (undated) RX ORDER — CEFAZOLIN SODIUM/WATER 2 G/20 ML
SYRINGE (ML) INTRAVENOUS
Status: DISPENSED
Start: 2023-10-25

## (undated) RX ORDER — HEPARIN SODIUM 1000 [USP'U]/ML
INJECTION, SOLUTION INTRAVENOUS; SUBCUTANEOUS
Status: DISPENSED
Start: 2023-10-02